# Patient Record
Sex: FEMALE | Race: ASIAN | NOT HISPANIC OR LATINO | Employment: FULL TIME | URBAN - METROPOLITAN AREA
[De-identification: names, ages, dates, MRNs, and addresses within clinical notes are randomized per-mention and may not be internally consistent; named-entity substitution may affect disease eponyms.]

---

## 2017-02-02 ENCOUNTER — COMMUNICATION - HEALTHEAST (OUTPATIENT)
Dept: FAMILY MEDICINE | Facility: CLINIC | Age: 32
End: 2017-02-02

## 2017-02-21 ENCOUNTER — RECORDS - HEALTHEAST (OUTPATIENT)
Dept: ADMINISTRATIVE | Facility: OTHER | Age: 32
End: 2017-02-21

## 2017-02-24 ENCOUNTER — RECORDS - HEALTHEAST (OUTPATIENT)
Dept: ADMINISTRATIVE | Facility: OTHER | Age: 32
End: 2017-02-24

## 2017-03-20 ENCOUNTER — COMMUNICATION - HEALTHEAST (OUTPATIENT)
Dept: FAMILY MEDICINE | Facility: CLINIC | Age: 32
End: 2017-03-20

## 2017-03-23 ENCOUNTER — RECORDS - HEALTHEAST (OUTPATIENT)
Dept: ADMINISTRATIVE | Facility: OTHER | Age: 32
End: 2017-03-23

## 2017-03-24 ENCOUNTER — OFFICE VISIT - HEALTHEAST (OUTPATIENT)
Dept: FAMILY MEDICINE | Facility: CLINIC | Age: 32
End: 2017-03-24

## 2017-03-24 DIAGNOSIS — J18.9 PNEUMONIA: ICD-10-CM

## 2017-03-24 DIAGNOSIS — R06.2 WHEEZING: ICD-10-CM

## 2017-03-24 ASSESSMENT — MIFFLIN-ST. JEOR: SCORE: 1452.48

## 2017-04-05 ENCOUNTER — OFFICE VISIT - HEALTHEAST (OUTPATIENT)
Dept: FAMILY MEDICINE | Facility: CLINIC | Age: 32
End: 2017-04-05

## 2017-04-05 DIAGNOSIS — Z30.09 OTHER GENERAL COUNSELING AND ADVICE FOR CONTRACEPTIVE MANAGEMENT: ICD-10-CM

## 2017-04-05 DIAGNOSIS — F06.4 ANXIETY DISORDER DUE TO MEDICAL CONDITION: ICD-10-CM

## 2017-04-05 ASSESSMENT — MIFFLIN-ST. JEOR: SCORE: 1477.2

## 2017-05-02 ENCOUNTER — OFFICE VISIT - HEALTHEAST (OUTPATIENT)
Dept: FAMILY MEDICINE | Facility: CLINIC | Age: 32
End: 2017-05-02

## 2017-05-02 DIAGNOSIS — H10.9 CONJUNCTIVITIS: ICD-10-CM

## 2017-05-02 ASSESSMENT — MIFFLIN-ST. JEOR: SCORE: 1479.46

## 2017-05-10 ENCOUNTER — OFFICE VISIT - HEALTHEAST (OUTPATIENT)
Dept: FAMILY MEDICINE | Facility: CLINIC | Age: 32
End: 2017-05-10

## 2017-05-10 ENCOUNTER — HOSPITAL ENCOUNTER (OUTPATIENT)
Dept: LAB | Age: 32
Setting detail: SPECIMEN
Discharge: HOME | End: 2017-05-10

## 2017-05-10 DIAGNOSIS — H53.8 BLURRY VISION: ICD-10-CM

## 2017-05-10 DIAGNOSIS — M25.50 ARTHRALGIA: ICD-10-CM

## 2017-05-10 ASSESSMENT — MIFFLIN-ST. JEOR: SCORE: 1493.07

## 2017-05-12 LAB — ANA SER QL: 0.3 U

## 2017-05-15 ENCOUNTER — COMMUNICATION - HEALTHEAST (OUTPATIENT)
Dept: FAMILY MEDICINE | Facility: CLINIC | Age: 32
End: 2017-05-15

## 2017-08-09 ENCOUNTER — OFFICE VISIT - HEALTHEAST (OUTPATIENT)
Dept: FAMILY MEDICINE | Facility: CLINIC | Age: 32
End: 2017-08-09

## 2017-08-09 DIAGNOSIS — M25.50 ARTHRALGIA: ICD-10-CM

## 2017-08-09 DIAGNOSIS — M79.10 MYALGIA: ICD-10-CM

## 2017-08-09 ASSESSMENT — MIFFLIN-ST. JEOR: SCORE: 1552.04

## 2017-08-14 ENCOUNTER — COMMUNICATION - HEALTHEAST (OUTPATIENT)
Dept: FAMILY MEDICINE | Facility: CLINIC | Age: 32
End: 2017-08-14

## 2017-08-14 DIAGNOSIS — E56.9 VITAMIN DEFICIENCY: ICD-10-CM

## 2017-09-07 ENCOUNTER — COMMUNICATION - HEALTHEAST (OUTPATIENT)
Dept: FAMILY MEDICINE | Facility: CLINIC | Age: 32
End: 2017-09-07

## 2017-09-07 DIAGNOSIS — F32.A DEPRESSION: ICD-10-CM

## 2017-12-05 ENCOUNTER — COMMUNICATION - HEALTHEAST (OUTPATIENT)
Dept: FAMILY MEDICINE | Facility: CLINIC | Age: 32
End: 2017-12-05

## 2017-12-05 DIAGNOSIS — F32.A DEPRESSION: ICD-10-CM

## 2017-12-27 ENCOUNTER — OFFICE VISIT - HEALTHEAST (OUTPATIENT)
Dept: FAMILY MEDICINE | Facility: CLINIC | Age: 32
End: 2017-12-27

## 2017-12-27 DIAGNOSIS — R51.9 HEADACHE: ICD-10-CM

## 2017-12-27 DIAGNOSIS — M72.2 PLANTAR FASCIITIS, BILATERAL: ICD-10-CM

## 2017-12-27 ASSESSMENT — MIFFLIN-ST. JEOR: SCORE: 1524.82

## 2018-01-29 ENCOUNTER — RECORDS - HEALTHEAST (OUTPATIENT)
Dept: ADMINISTRATIVE | Facility: OTHER | Age: 33
End: 2018-01-29

## 2018-04-07 ENCOUNTER — COMMUNICATION - HEALTHEAST (OUTPATIENT)
Dept: FAMILY MEDICINE | Facility: CLINIC | Age: 33
End: 2018-04-07

## 2018-04-07 DIAGNOSIS — F32.A DEPRESSED: ICD-10-CM

## 2018-04-12 ENCOUNTER — COMMUNICATION - HEALTHEAST (OUTPATIENT)
Dept: FAMILY MEDICINE | Facility: CLINIC | Age: 33
End: 2018-04-12

## 2018-04-12 DIAGNOSIS — E56.9 VITAMIN DEFICIENCY: ICD-10-CM

## 2018-06-04 ENCOUNTER — OFFICE VISIT - HEALTHEAST (OUTPATIENT)
Dept: FAMILY MEDICINE | Facility: CLINIC | Age: 33
End: 2018-06-04

## 2018-06-04 DIAGNOSIS — Z11.3 SCREENING FOR STD (SEXUALLY TRANSMITTED DISEASE): ICD-10-CM

## 2018-06-04 DIAGNOSIS — Z30.432 ENCOUNTER FOR IUD REMOVAL: ICD-10-CM

## 2018-06-04 DIAGNOSIS — L29.9 ITCHING: ICD-10-CM

## 2018-06-04 LAB
CLUE CELLS: NORMAL
TRICHOMONAS, WET PREP: NORMAL
YEAST, WET PREP: NORMAL

## 2018-06-04 RX ORDER — HYDROCORTISONE 10 MG/ML
LOTION TOPICAL
Qty: 118 ML | Refills: 0 | Status: SHIPPED | OUTPATIENT
Start: 2018-06-04 | End: 2023-06-06

## 2018-06-04 ASSESSMENT — MIFFLIN-ST. JEOR: SCORE: 1502.14

## 2018-06-05 ENCOUNTER — COMMUNICATION - HEALTHEAST (OUTPATIENT)
Dept: FAMILY MEDICINE | Facility: CLINIC | Age: 33
End: 2018-06-05

## 2018-06-05 LAB
C TRACH DNA SPEC QL PROBE+SIG AMP: NEGATIVE
N GONORRHOEA DNA SPEC QL NAA+PROBE: NEGATIVE

## 2018-07-17 ENCOUNTER — OFFICE VISIT - HEALTHEAST (OUTPATIENT)
Dept: FAMILY MEDICINE | Facility: CLINIC | Age: 33
End: 2018-07-17

## 2018-07-17 DIAGNOSIS — J02.9 SORE THROAT: ICD-10-CM

## 2018-07-17 DIAGNOSIS — R50.9 FEVER: ICD-10-CM

## 2018-07-17 DIAGNOSIS — J03.01 ACUTE RECURRENT STREPTOCOCCAL TONSILLITIS: ICD-10-CM

## 2018-07-17 LAB — DEPRECATED S PYO AG THROAT QL EIA: ABNORMAL

## 2018-07-17 ASSESSMENT — MIFFLIN-ST. JEOR: SCORE: 1529.36

## 2018-07-31 ENCOUNTER — COMMUNICATION - HEALTHEAST (OUTPATIENT)
Dept: FAMILY MEDICINE | Facility: CLINIC | Age: 33
End: 2018-07-31

## 2018-07-31 DIAGNOSIS — J03.01 ACUTE RECURRENT STREPTOCOCCAL TONSILLITIS: ICD-10-CM

## 2018-08-08 ENCOUNTER — COMMUNICATION - HEALTHEAST (OUTPATIENT)
Dept: FAMILY MEDICINE | Facility: CLINIC | Age: 33
End: 2018-08-08

## 2018-08-08 DIAGNOSIS — J03.01 ACUTE RECURRENT STREPTOCOCCAL TONSILLITIS: ICD-10-CM

## 2018-08-16 ENCOUNTER — COMMUNICATION - HEALTHEAST (OUTPATIENT)
Dept: FAMILY MEDICINE | Facility: CLINIC | Age: 33
End: 2018-08-16

## 2018-08-16 DIAGNOSIS — J03.01 ACUTE RECURRENT STREPTOCOCCAL TONSILLITIS: ICD-10-CM

## 2018-09-28 ENCOUNTER — OFFICE VISIT - HEALTHEAST (OUTPATIENT)
Dept: FAMILY MEDICINE | Facility: CLINIC | Age: 33
End: 2018-09-28

## 2018-09-28 DIAGNOSIS — G56.03 BILATERAL CARPAL TUNNEL SYNDROME: ICD-10-CM

## 2018-09-28 DIAGNOSIS — F06.4 ANXIETY DISORDER DUE TO MEDICAL CONDITION: ICD-10-CM

## 2018-09-28 DIAGNOSIS — Z23 IMMUNIZATION DUE: ICD-10-CM

## 2018-09-28 DIAGNOSIS — M54.9 UPPER BACK PAIN: ICD-10-CM

## 2018-09-28 DIAGNOSIS — Z23 NEED FOR IMMUNIZATION AGAINST INFLUENZA: ICD-10-CM

## 2018-09-28 ASSESSMENT — MIFFLIN-ST. JEOR: SCORE: 1511.22

## 2019-01-06 ENCOUNTER — COMMUNICATION - HEALTHEAST (OUTPATIENT)
Dept: FAMILY MEDICINE | Facility: CLINIC | Age: 34
End: 2019-01-06

## 2019-01-06 DIAGNOSIS — G89.29 OTHER CHRONIC PAIN: ICD-10-CM

## 2019-01-15 ENCOUNTER — COMMUNICATION - HEALTHEAST (OUTPATIENT)
Dept: FAMILY MEDICINE | Facility: CLINIC | Age: 34
End: 2019-01-15

## 2019-01-15 DIAGNOSIS — F32.A DEPRESSED: ICD-10-CM

## 2019-03-25 ENCOUNTER — AMBULATORY - HEALTHEAST (OUTPATIENT)
Dept: FAMILY MEDICINE | Facility: CLINIC | Age: 34
End: 2019-03-25

## 2019-03-25 DIAGNOSIS — F06.4 ANXIETY DISORDER DUE TO MEDICAL CONDITION: ICD-10-CM

## 2019-03-25 DIAGNOSIS — F43.10 POSTTRAUMATIC STRESS DISORDER: ICD-10-CM

## 2019-03-25 DIAGNOSIS — Z63.5 FAMILY DISRUPTION DUE TO DIVORCE: ICD-10-CM

## 2019-03-25 DIAGNOSIS — F60.3 BORDERLINE PERSONALITY DISORDER (H): ICD-10-CM

## 2019-03-25 SDOH — SOCIAL STABILITY - SOCIAL INSECURITY: DISRUPTION OF FAMILY BY SEPARATION AND DIVORCE: Z63.5

## 2019-04-02 ENCOUNTER — OFFICE VISIT - HEALTHEAST (OUTPATIENT)
Dept: FAMILY MEDICINE | Facility: CLINIC | Age: 34
End: 2019-04-02

## 2019-04-02 DIAGNOSIS — J02.0 ACUTE STREPTOCOCCAL PHARYNGITIS: ICD-10-CM

## 2019-04-02 DIAGNOSIS — J02.9 SORE THROAT: ICD-10-CM

## 2019-04-02 LAB — DEPRECATED S PYO AG THROAT QL EIA: ABNORMAL

## 2019-04-02 ASSESSMENT — MIFFLIN-ST. JEOR: SCORE: 1578.12

## 2019-04-20 ENCOUNTER — COMMUNICATION - HEALTHEAST (OUTPATIENT)
Dept: FAMILY MEDICINE | Facility: CLINIC | Age: 34
End: 2019-04-20

## 2019-04-20 DIAGNOSIS — E56.9 VITAMIN DEFICIENCY: ICD-10-CM

## 2019-05-20 ENCOUNTER — COMMUNICATION - HEALTHEAST (OUTPATIENT)
Dept: FAMILY MEDICINE | Facility: CLINIC | Age: 34
End: 2019-05-20

## 2019-05-21 ENCOUNTER — COMMUNICATION - HEALTHEAST (OUTPATIENT)
Dept: FAMILY MEDICINE | Facility: CLINIC | Age: 34
End: 2019-05-21

## 2019-05-22 ENCOUNTER — COMMUNICATION - HEALTHEAST (OUTPATIENT)
Dept: ADMINISTRATIVE | Facility: CLINIC | Age: 34
End: 2019-05-22

## 2019-05-28 ENCOUNTER — OFFICE VISIT - HEALTHEAST (OUTPATIENT)
Dept: FAMILY MEDICINE | Facility: CLINIC | Age: 34
End: 2019-05-28

## 2019-05-28 DIAGNOSIS — F43.10 POSTTRAUMATIC STRESS DISORDER: ICD-10-CM

## 2019-05-28 DIAGNOSIS — E66.3 OVERWEIGHT: ICD-10-CM

## 2019-05-28 DIAGNOSIS — F06.4 ANXIETY DISORDER DUE TO MEDICAL CONDITION: ICD-10-CM

## 2019-05-28 LAB
ALBUMIN SERPL-MCNC: 3.7 G/DL (ref 3.5–5)
ALP SERPL-CCNC: 64 U/L (ref 45–120)
ALT SERPL W P-5'-P-CCNC: 31 U/L (ref 0–45)
ANION GAP SERPL CALCULATED.3IONS-SCNC: 12 MMOL/L (ref 5–18)
AST SERPL W P-5'-P-CCNC: 34 U/L (ref 0–40)
BASOPHILS # BLD AUTO: 0.1 THOU/UL (ref 0–0.2)
BASOPHILS NFR BLD AUTO: 1 % (ref 0–2)
BILIRUB SERPL-MCNC: 0.1 MG/DL (ref 0–1)
BUN SERPL-MCNC: 12 MG/DL (ref 8–22)
CALCIUM SERPL-MCNC: 9.6 MG/DL (ref 8.5–10.5)
CHLORIDE BLD-SCNC: 103 MMOL/L (ref 98–107)
CO2 SERPL-SCNC: 22 MMOL/L (ref 22–31)
CREAT SERPL-MCNC: 0.67 MG/DL (ref 0.6–1.1)
EOSINOPHIL # BLD AUTO: 0.2 THOU/UL (ref 0–0.4)
EOSINOPHIL NFR BLD AUTO: 2 % (ref 0–6)
ERYTHROCYTE [DISTWIDTH] IN BLOOD BY AUTOMATED COUNT: 12.9 % (ref 11–14.5)
GFR SERPL CREATININE-BSD FRML MDRD: >60 ML/MIN/1.73M2
GLUCOSE BLD-MCNC: 135 MG/DL (ref 70–125)
HCT VFR BLD AUTO: 38.3 % (ref 35–47)
HGB BLD-MCNC: 12.6 G/DL (ref 12–16)
LYMPHOCYTES # BLD AUTO: 2.4 THOU/UL (ref 0.8–4.4)
LYMPHOCYTES NFR BLD AUTO: 26 % (ref 20–40)
MCH RBC QN AUTO: 25.4 PG (ref 27–34)
MCHC RBC AUTO-ENTMCNC: 32.9 G/DL (ref 32–36)
MCV RBC AUTO: 77 FL (ref 80–100)
MONOCYTES # BLD AUTO: 0.6 THOU/UL (ref 0–0.9)
MONOCYTES NFR BLD AUTO: 6 % (ref 2–10)
NEUTROPHILS # BLD AUTO: 5.9 THOU/UL (ref 2–7.7)
NEUTROPHILS NFR BLD AUTO: 65 % (ref 50–70)
PLATELET # BLD AUTO: 343 THOU/UL (ref 140–440)
PMV BLD AUTO: 7.6 FL (ref 7–10)
POTASSIUM BLD-SCNC: 3.9 MMOL/L (ref 3.5–5)
PROT SERPL-MCNC: 7.3 G/DL (ref 6–8)
RBC # BLD AUTO: 4.96 MILL/UL (ref 3.8–5.4)
SODIUM SERPL-SCNC: 137 MMOL/L (ref 136–145)
TSH SERPL DL<=0.005 MIU/L-ACNC: 2.99 UIU/ML (ref 0.3–5)
WBC: 9.1 THOU/UL (ref 4–11)

## 2019-05-28 ASSESSMENT — MIFFLIN-ST. JEOR: SCORE: 1574.72

## 2019-06-03 ENCOUNTER — COMMUNICATION - HEALTHEAST (OUTPATIENT)
Dept: FAMILY MEDICINE | Facility: CLINIC | Age: 34
End: 2019-06-03

## 2019-09-16 ENCOUNTER — COMMUNICATION - HEALTHEAST (OUTPATIENT)
Dept: FAMILY MEDICINE | Facility: CLINIC | Age: 34
End: 2019-09-16

## 2019-09-28 ENCOUNTER — COMMUNICATION - HEALTHEAST (OUTPATIENT)
Dept: FAMILY MEDICINE | Facility: CLINIC | Age: 34
End: 2019-09-28

## 2019-09-28 DIAGNOSIS — F32.A DEPRESSED: ICD-10-CM

## 2019-10-21 ENCOUNTER — COMMUNICATION - HEALTHEAST (OUTPATIENT)
Dept: FAMILY MEDICINE | Facility: CLINIC | Age: 34
End: 2019-10-21

## 2019-10-21 DIAGNOSIS — G89.29 OTHER CHRONIC PAIN: ICD-10-CM

## 2019-11-21 ENCOUNTER — OFFICE VISIT - HEALTHEAST (OUTPATIENT)
Dept: FAMILY MEDICINE | Facility: CLINIC | Age: 34
End: 2019-11-21

## 2019-11-21 DIAGNOSIS — F33.1 MODERATE EPISODE OF RECURRENT MAJOR DEPRESSIVE DISORDER (H): ICD-10-CM

## 2019-11-21 DIAGNOSIS — Z09 HOSPITAL DISCHARGE FOLLOW-UP: ICD-10-CM

## 2019-11-21 DIAGNOSIS — R73.9 ELEVATED RANDOM BLOOD GLUCOSE LEVEL: ICD-10-CM

## 2019-11-21 DIAGNOSIS — Z23 NEED FOR IMMUNIZATION AGAINST INFLUENZA: ICD-10-CM

## 2019-11-21 LAB — HBA1C MFR BLD: 5.1 % (ref 3.5–6)

## 2019-11-21 ASSESSMENT — MIFFLIN-ST. JEOR: SCORE: 1470.39

## 2019-11-21 ASSESSMENT — PATIENT HEALTH QUESTIONNAIRE - PHQ9: SUM OF ALL RESPONSES TO PHQ QUESTIONS 1-9: 10

## 2020-02-03 ENCOUNTER — OFFICE VISIT - HEALTHEAST (OUTPATIENT)
Dept: FAMILY MEDICINE | Facility: CLINIC | Age: 35
End: 2020-02-03

## 2020-02-03 DIAGNOSIS — A08.4 VIRAL GASTROENTERITIS: ICD-10-CM

## 2020-02-03 DIAGNOSIS — R50.9 FEVER IN ADULT: ICD-10-CM

## 2020-02-03 LAB
FLUAV AG SPEC QL IA: NORMAL
FLUBV AG SPEC QL IA: NORMAL

## 2020-02-03 ASSESSMENT — MIFFLIN-ST. JEOR: SCORE: 1452.53

## 2020-02-04 ENCOUNTER — COMMUNICATION - HEALTHEAST (OUTPATIENT)
Dept: FAMILY MEDICINE | Facility: CLINIC | Age: 35
End: 2020-02-04

## 2020-02-07 ENCOUNTER — OFFICE VISIT - HEALTHEAST (OUTPATIENT)
Dept: FAMILY MEDICINE | Facility: CLINIC | Age: 35
End: 2020-02-07

## 2020-02-07 DIAGNOSIS — R10.13 EPIGASTRIC ABDOMINAL PAIN: ICD-10-CM

## 2020-02-07 DIAGNOSIS — R07.0 THROAT PAIN: ICD-10-CM

## 2020-02-07 LAB — DEPRECATED S PYO AG THROAT QL EIA: NORMAL

## 2020-02-09 LAB — GROUP A STREP BY PCR: NORMAL

## 2020-02-20 ENCOUNTER — COMMUNICATION - HEALTHEAST (OUTPATIENT)
Dept: FAMILY MEDICINE | Facility: CLINIC | Age: 35
End: 2020-02-20

## 2020-03-02 ENCOUNTER — RECORDS - HEALTHEAST (OUTPATIENT)
Dept: ADMINISTRATIVE | Facility: OTHER | Age: 35
End: 2020-03-02

## 2020-05-09 ENCOUNTER — COMMUNICATION - HEALTHEAST (OUTPATIENT)
Dept: FAMILY MEDICINE | Facility: CLINIC | Age: 35
End: 2020-05-09

## 2020-05-09 DIAGNOSIS — E56.9 VITAMIN DEFICIENCY: ICD-10-CM

## 2020-05-17 ENCOUNTER — RECORDS - HEALTHEAST (OUTPATIENT)
Dept: ADMINISTRATIVE | Facility: OTHER | Age: 35
End: 2020-05-17

## 2020-05-18 ENCOUNTER — OFFICE VISIT - HEALTHEAST (OUTPATIENT)
Dept: FAMILY MEDICINE | Facility: CLINIC | Age: 35
End: 2020-05-18

## 2020-05-18 DIAGNOSIS — U07.1 2019 NOVEL CORONAVIRUS DISEASE (COVID-19): ICD-10-CM

## 2020-05-19 ENCOUNTER — COMMUNICATION - HEALTHEAST (OUTPATIENT)
Dept: SCHEDULING | Facility: CLINIC | Age: 35
End: 2020-05-19

## 2020-05-20 ENCOUNTER — OFFICE VISIT - HEALTHEAST (OUTPATIENT)
Dept: FAMILY MEDICINE | Facility: CLINIC | Age: 35
End: 2020-05-20

## 2020-05-20 DIAGNOSIS — U07.1 2019 NOVEL CORONAVIRUS DISEASE (COVID-19): ICD-10-CM

## 2020-05-26 ENCOUNTER — COMMUNICATION - HEALTHEAST (OUTPATIENT)
Dept: FAMILY MEDICINE | Facility: CLINIC | Age: 35
End: 2020-05-26

## 2020-05-26 ENCOUNTER — OFFICE VISIT - HEALTHEAST (OUTPATIENT)
Dept: FAMILY MEDICINE | Facility: CLINIC | Age: 35
End: 2020-05-26

## 2020-05-26 DIAGNOSIS — R52 GENERALIZED BODY ACHES: ICD-10-CM

## 2020-05-26 DIAGNOSIS — U07.1 2019 NOVEL CORONAVIRUS DISEASE (COVID-19): ICD-10-CM

## 2020-05-26 DIAGNOSIS — M62.81 GENERALIZED MUSCLE WEAKNESS: ICD-10-CM

## 2020-05-26 DIAGNOSIS — R53.83 OTHER FATIGUE: ICD-10-CM

## 2020-05-29 ENCOUNTER — OFFICE VISIT - HEALTHEAST (OUTPATIENT)
Dept: FAMILY MEDICINE | Facility: CLINIC | Age: 35
End: 2020-05-29

## 2020-05-29 DIAGNOSIS — U07.1 2019 NOVEL CORONAVIRUS DISEASE (COVID-19): ICD-10-CM

## 2020-05-29 DIAGNOSIS — R42 DIZZINESS: ICD-10-CM

## 2020-05-29 DIAGNOSIS — U07.1 CLINICAL DIAGNOSIS OF COVID-19: ICD-10-CM

## 2020-06-03 ENCOUNTER — COMMUNICATION - HEALTHEAST (OUTPATIENT)
Dept: FAMILY MEDICINE | Facility: CLINIC | Age: 35
End: 2020-06-03

## 2020-06-03 DIAGNOSIS — F32.A DEPRESSED: ICD-10-CM

## 2020-06-15 ENCOUNTER — COMMUNICATION - HEALTHEAST (OUTPATIENT)
Dept: FAMILY MEDICINE | Facility: CLINIC | Age: 35
End: 2020-06-15

## 2020-06-22 ENCOUNTER — COMMUNICATION - HEALTHEAST (OUTPATIENT)
Dept: SCHEDULING | Facility: CLINIC | Age: 35
End: 2020-06-22

## 2020-07-17 ENCOUNTER — COMMUNICATION - HEALTHEAST (OUTPATIENT)
Dept: FAMILY MEDICINE | Facility: CLINIC | Age: 35
End: 2020-07-17

## 2020-07-17 DIAGNOSIS — G89.29 OTHER CHRONIC PAIN: ICD-10-CM

## 2020-07-20 ENCOUNTER — OFFICE VISIT - HEALTHEAST (OUTPATIENT)
Dept: FAMILY MEDICINE | Facility: CLINIC | Age: 35
End: 2020-07-20

## 2020-07-20 DIAGNOSIS — R06.02 SHORTNESS OF BREATH: ICD-10-CM

## 2020-07-20 DIAGNOSIS — F32.A DEPRESSED: ICD-10-CM

## 2020-07-20 DIAGNOSIS — R53.82 CHRONIC FATIGUE: ICD-10-CM

## 2020-07-20 DIAGNOSIS — Z12.4 SCREENING FOR CERVICAL CANCER: ICD-10-CM

## 2020-07-20 DIAGNOSIS — U07.1 2019 NOVEL CORONAVIRUS DISEASE (COVID-19): ICD-10-CM

## 2020-07-20 DIAGNOSIS — D64.9 ANEMIA, UNSPECIFIED TYPE: ICD-10-CM

## 2020-07-20 DIAGNOSIS — N89.8 VAGINAL ITCHING: ICD-10-CM

## 2020-07-20 LAB
BASOPHILS # BLD AUTO: 0 THOU/UL (ref 0–0.2)
BASOPHILS NFR BLD AUTO: 0 % (ref 0–2)
EOSINOPHIL # BLD AUTO: 0.2 THOU/UL (ref 0–0.4)
EOSINOPHIL NFR BLD AUTO: 3 % (ref 0–6)
ERYTHROCYTE [DISTWIDTH] IN BLOOD BY AUTOMATED COUNT: 13.2 % (ref 11–14.5)
HCT VFR BLD AUTO: 35.5 % (ref 35–47)
HGB BLD-MCNC: 11.8 G/DL (ref 12–16)
LYMPHOCYTES # BLD AUTO: 2.3 THOU/UL (ref 0.8–4.4)
LYMPHOCYTES NFR BLD AUTO: 30 % (ref 20–40)
MCH RBC QN AUTO: 24.8 PG (ref 27–34)
MCHC RBC AUTO-ENTMCNC: 33.2 G/DL (ref 32–36)
MCV RBC AUTO: 75 FL (ref 80–100)
MONOCYTES # BLD AUTO: 0.6 THOU/UL (ref 0–0.9)
MONOCYTES NFR BLD AUTO: 7 % (ref 2–10)
NEUTROPHILS # BLD AUTO: 4.7 THOU/UL (ref 2–7.7)
NEUTROPHILS NFR BLD AUTO: 60 % (ref 50–70)
PLATELET # BLD AUTO: 327 THOU/UL (ref 140–440)
PMV BLD AUTO: 7.4 FL (ref 7–10)
RBC # BLD AUTO: 4.75 MILL/UL (ref 3.8–5.4)
TSH SERPL DL<=0.005 MIU/L-ACNC: 1.74 UIU/ML (ref 0.3–5)
WBC: 7.8 THOU/UL (ref 4–11)

## 2020-07-20 RX ORDER — FERROUS SULFATE 325(65) MG
1 TABLET ORAL DAILY
Qty: 90 TABLET | Refills: 3 | Status: SHIPPED | OUTPATIENT
Start: 2020-07-20 | End: 2021-07-21

## 2020-07-20 ASSESSMENT — MIFFLIN-ST. JEOR: SCORE: 1472.67

## 2020-07-21 ENCOUNTER — COMMUNICATION - HEALTHEAST (OUTPATIENT)
Dept: FAMILY MEDICINE | Facility: CLINIC | Age: 35
End: 2020-07-21

## 2020-07-21 LAB
HPV SOURCE: NORMAL
HUMAN PAPILLOMA VIRUS 16 DNA: NEGATIVE
HUMAN PAPILLOMA VIRUS 18 DNA: NEGATIVE
HUMAN PAPILLOMA VIRUS FINAL DIAGNOSIS: NORMAL
HUMAN PAPILLOMA VIRUS OTHER HR: NEGATIVE
SPECIMEN DESCRIPTION: NORMAL

## 2020-07-29 ENCOUNTER — COMMUNICATION - HEALTHEAST (OUTPATIENT)
Dept: FAMILY MEDICINE | Facility: CLINIC | Age: 35
End: 2020-07-29

## 2020-07-29 LAB
BKR LAB AP ABNORMAL BLEEDING: NO
BKR LAB AP BIRTH CONTROL/HORMONES: NORMAL
BKR LAB AP CERVICAL APPEARANCE: NORMAL
BKR LAB AP GYN ADEQUACY: NORMAL
BKR LAB AP GYN INTERPRETATION: NORMAL
BKR LAB AP HPV REFLEX: NORMAL
BKR LAB AP LMP: NORMAL
BKR LAB AP PATIENT STATUS: NORMAL
BKR LAB AP PREVIOUS ABNORMAL: NO
BKR LAB AP PREVIOUS NORMAL: 2015
HIGH RISK?: NO
PATH REPORT.COMMENTS IMP SPEC: NORMAL
RESULT FLAG (HE HISTORICAL CONVERSION): NORMAL

## 2020-11-27 ENCOUNTER — RECORDS - HEALTHEAST (OUTPATIENT)
Dept: ADMINISTRATIVE | Facility: OTHER | Age: 35
End: 2020-11-27

## 2021-01-12 ENCOUNTER — OFFICE VISIT - HEALTHEAST (OUTPATIENT)
Dept: FAMILY MEDICINE | Facility: CLINIC | Age: 36
End: 2021-01-12

## 2021-01-12 DIAGNOSIS — R00.2 PALPITATIONS: ICD-10-CM

## 2021-01-12 DIAGNOSIS — F06.4 ANXIETY DISORDER DUE TO MEDICAL CONDITION: ICD-10-CM

## 2021-01-12 DIAGNOSIS — F42.8 OTHER OBSESSIVE-COMPULSIVE DISORDERS: ICD-10-CM

## 2021-01-12 DIAGNOSIS — F60.3 BORDERLINE PERSONALITY DISORDER (H): ICD-10-CM

## 2021-01-12 DIAGNOSIS — R55 NEAR SYNCOPE: ICD-10-CM

## 2021-01-12 DIAGNOSIS — F43.10 POSTTRAUMATIC STRESS DISORDER: ICD-10-CM

## 2021-01-12 DIAGNOSIS — F33.1 MODERATE EPISODE OF RECURRENT MAJOR DEPRESSIVE DISORDER (H): ICD-10-CM

## 2021-01-12 DIAGNOSIS — G44.229 CHRONIC TENSION-TYPE HEADACHE, NOT INTRACTABLE: ICD-10-CM

## 2021-01-12 ASSESSMENT — MIFFLIN-ST. JEOR: SCORE: 1469.27

## 2021-03-02 ENCOUNTER — HOSPITAL ENCOUNTER (OUTPATIENT)
Dept: CARDIOLOGY | Facility: HOSPITAL | Age: 36
Discharge: HOME | End: 2021-03-02
Attending: FAMILY MEDICINE

## 2021-03-02 DIAGNOSIS — R55 NEAR SYNCOPE: ICD-10-CM

## 2021-03-02 DIAGNOSIS — R00.2 PALPITATIONS: ICD-10-CM

## 2021-03-10 ENCOUNTER — OFFICE VISIT - HEALTHEAST (OUTPATIENT)
Dept: FAMILY MEDICINE | Facility: CLINIC | Age: 36
End: 2021-03-10

## 2021-03-10 ENCOUNTER — COMMUNICATION - HEALTHEAST (OUTPATIENT)
Dept: SCHEDULING | Facility: CLINIC | Age: 36
End: 2021-03-10

## 2021-03-10 DIAGNOSIS — F06.4 ANXIETY DISORDER DUE TO MEDICAL CONDITION: ICD-10-CM

## 2021-03-10 DIAGNOSIS — Z11.4 SCREENING FOR HIV WITHOUT PRESENCE OF RISK FACTORS: ICD-10-CM

## 2021-03-10 DIAGNOSIS — F43.10 POSTTRAUMATIC STRESS DISORDER: ICD-10-CM

## 2021-03-10 DIAGNOSIS — F41.0 PANIC ATTACK: ICD-10-CM

## 2021-03-10 DIAGNOSIS — F60.3 BORDERLINE PERSONALITY DISORDER (H): ICD-10-CM

## 2021-03-10 DIAGNOSIS — Z11.59 ENCOUNTER FOR HEPATITIS C SCREENING TEST FOR LOW RISK PATIENT: ICD-10-CM

## 2021-03-10 DIAGNOSIS — R73.9 ELEVATED RANDOM BLOOD GLUCOSE LEVEL: ICD-10-CM

## 2021-03-10 DIAGNOSIS — E66.3 OVERWEIGHT: ICD-10-CM

## 2021-03-10 LAB
ALBUMIN SERPL-MCNC: 4 G/DL (ref 3.5–5)
ALBUMIN UR-MCNC: NEGATIVE G/DL
ALP SERPL-CCNC: 56 U/L (ref 45–120)
ALT SERPL W P-5'-P-CCNC: 22 U/L (ref 0–45)
AMORPH CRY #/AREA URNS HPF: ABNORMAL /[HPF]
ANION GAP SERPL CALCULATED.3IONS-SCNC: 13 MMOL/L (ref 5–18)
APPEARANCE UR: CLEAR
AST SERPL W P-5'-P-CCNC: 26 U/L (ref 0–40)
BACTERIA #/AREA URNS HPF: ABNORMAL /[HPF]
BILIRUB SERPL-MCNC: 0.2 MG/DL (ref 0–1)
BILIRUB UR QL STRIP: NEGATIVE
BUN SERPL-MCNC: 18 MG/DL (ref 8–22)
CALCIUM SERPL-MCNC: 8.8 MG/DL (ref 8.5–10.5)
CHLORIDE BLD-SCNC: 102 MMOL/L (ref 98–107)
CO2 SERPL-SCNC: 26 MMOL/L (ref 22–31)
COLOR UR AUTO: YELLOW
CREAT SERPL-MCNC: 0.8 MG/DL (ref 0.6–1.1)
ERYTHROCYTE [DISTWIDTH] IN BLOOD BY AUTOMATED COUNT: 12.9 % (ref 11–14.5)
GFR SERPL CREATININE-BSD FRML MDRD: >60 ML/MIN/1.73M2
GLUCOSE BLD-MCNC: 89 MG/DL (ref 70–125)
GLUCOSE UR STRIP-MCNC: NEGATIVE MG/DL
HBA1C MFR BLD: 5.2 %
HCT VFR BLD AUTO: 44.1 % (ref 35–47)
HGB BLD-MCNC: 14.2 G/DL (ref 12–16)
HGB UR QL STRIP: ABNORMAL
HIV 1+2 AB+HIV1 P24 AG SERPL QL IA: NEGATIVE
KETONES UR STRIP-MCNC: NEGATIVE MG/DL
LEUKOCYTE ESTERASE UR QL STRIP: NEGATIVE
MCH RBC QN AUTO: 26.1 PG (ref 27–34)
MCHC RBC AUTO-ENTMCNC: 32.2 G/DL (ref 32–36)
MCV RBC AUTO: 81 FL (ref 80–100)
NITRATE UR QL: NEGATIVE
PH UR STRIP: 8.5 [PH] (ref 5–8)
PLATELET # BLD AUTO: 347 THOU/UL (ref 140–440)
PMV BLD AUTO: 9.4 FL (ref 7–10)
POTASSIUM BLD-SCNC: 3.9 MMOL/L (ref 3.5–5)
PROT SERPL-MCNC: 7.6 G/DL (ref 6–8)
RBC # BLD AUTO: 5.45 MILL/UL (ref 3.8–5.4)
RBC #/AREA URNS AUTO: ABNORMAL HPF
SODIUM SERPL-SCNC: 141 MMOL/L (ref 136–145)
SP GR UR STRIP: 1.02 (ref 1–1.03)
SQUAMOUS #/AREA URNS AUTO: ABNORMAL LPF
TSH SERPL DL<=0.005 MIU/L-ACNC: 2.33 UIU/ML (ref 0.3–5)
UROBILINOGEN UR STRIP-ACNC: ABNORMAL
WBC #/AREA URNS AUTO: ABNORMAL HPF
WBC: 6.9 THOU/UL (ref 4–11)

## 2021-03-10 RX ORDER — PROPRANOLOL HYDROCHLORIDE 10 MG/1
10 TABLET ORAL 3 TIMES DAILY PRN
Qty: 30 TABLET | Refills: 3 | Status: SHIPPED | OUTPATIENT
Start: 2021-03-10 | End: 2021-07-21

## 2021-03-10 ASSESSMENT — PATIENT HEALTH QUESTIONNAIRE - PHQ9: SUM OF ALL RESPONSES TO PHQ QUESTIONS 1-9: 0

## 2021-03-10 ASSESSMENT — MIFFLIN-ST. JEOR: SCORE: 1472.67

## 2021-03-11 ENCOUNTER — COMMUNICATION - HEALTHEAST (OUTPATIENT)
Dept: FAMILY MEDICINE | Facility: CLINIC | Age: 36
End: 2021-03-11

## 2021-03-11 ENCOUNTER — RECORDS - HEALTHEAST (OUTPATIENT)
Dept: ADMINISTRATIVE | Facility: OTHER | Age: 36
End: 2021-03-11

## 2021-03-11 LAB
BACTERIA SPEC CULT: NORMAL
HCV AB SERPL QL IA: NEGATIVE

## 2021-03-15 ENCOUNTER — COMMUNICATION - HEALTHEAST (OUTPATIENT)
Dept: FAMILY MEDICINE | Facility: CLINIC | Age: 36
End: 2021-03-15

## 2021-03-16 ENCOUNTER — COMMUNICATION - HEALTHEAST (OUTPATIENT)
Dept: FAMILY MEDICINE | Facility: CLINIC | Age: 36
End: 2021-03-16

## 2021-03-22 ENCOUNTER — COMMUNICATION - HEALTHEAST (OUTPATIENT)
Dept: NURSING | Facility: CLINIC | Age: 36
End: 2021-03-22

## 2021-03-22 ENCOUNTER — OFFICE VISIT - HEALTHEAST (OUTPATIENT)
Dept: FAMILY MEDICINE | Facility: CLINIC | Age: 36
End: 2021-03-22

## 2021-03-22 DIAGNOSIS — F41.0 PANIC ATTACK: ICD-10-CM

## 2021-03-24 ENCOUNTER — COMMUNICATION - HEALTHEAST (OUTPATIENT)
Dept: CARE COORDINATION | Facility: CLINIC | Age: 36
End: 2021-03-24

## 2021-03-25 ENCOUNTER — COMMUNICATION - HEALTHEAST (OUTPATIENT)
Dept: CARE COORDINATION | Facility: CLINIC | Age: 36
End: 2021-03-25

## 2021-03-26 ENCOUNTER — COMMUNICATION - HEALTHEAST (OUTPATIENT)
Dept: CARE COORDINATION | Facility: CLINIC | Age: 36
End: 2021-03-26

## 2021-03-29 ENCOUNTER — COMMUNICATION - HEALTHEAST (OUTPATIENT)
Dept: CARE COORDINATION | Facility: CLINIC | Age: 36
End: 2021-03-29

## 2021-03-30 ENCOUNTER — COMMUNICATION - HEALTHEAST (OUTPATIENT)
Dept: NURSING | Facility: CLINIC | Age: 36
End: 2021-03-30

## 2021-03-31 ENCOUNTER — COMMUNICATION - HEALTHEAST (OUTPATIENT)
Dept: NURSING | Facility: CLINIC | Age: 36
End: 2021-03-31

## 2021-04-05 ENCOUNTER — COMMUNICATION - HEALTHEAST (OUTPATIENT)
Dept: NURSING | Facility: CLINIC | Age: 36
End: 2021-04-05

## 2021-04-08 ENCOUNTER — COMMUNICATION - HEALTHEAST (OUTPATIENT)
Dept: CARE COORDINATION | Facility: CLINIC | Age: 36
End: 2021-04-08

## 2021-04-13 ENCOUNTER — COMMUNICATION - HEALTHEAST (OUTPATIENT)
Dept: FAMILY MEDICINE | Facility: CLINIC | Age: 36
End: 2021-04-13

## 2021-04-13 DIAGNOSIS — G89.29 OTHER CHRONIC PAIN: ICD-10-CM

## 2021-04-14 ENCOUNTER — COMMUNICATION - HEALTHEAST (OUTPATIENT)
Dept: FAMILY MEDICINE | Facility: CLINIC | Age: 36
End: 2021-04-14

## 2021-04-14 DIAGNOSIS — F32.A DEPRESSED: ICD-10-CM

## 2021-04-14 RX ORDER — GABAPENTIN 300 MG/1
CAPSULE ORAL
Qty: 270 CAPSULE | Refills: 3 | Status: SHIPPED | OUTPATIENT
Start: 2021-04-14 | End: 2022-04-13

## 2021-04-15 RX ORDER — PAROXETINE 20 MG/1
TABLET, FILM COATED ORAL
Qty: 180 TABLET | Refills: 3 | Status: SHIPPED | OUTPATIENT
Start: 2021-04-15 | End: 2022-05-23

## 2021-04-20 ENCOUNTER — COMMUNICATION - HEALTHEAST (OUTPATIENT)
Dept: FAMILY MEDICINE | Facility: CLINIC | Age: 36
End: 2021-04-20

## 2021-04-20 DIAGNOSIS — U07.1 2019 NOVEL CORONAVIRUS DISEASE (COVID-19): ICD-10-CM

## 2021-04-20 RX ORDER — ACETAMINOPHEN 325 MG/1
TABLET ORAL
Qty: 300 TABLET | Refills: 1 | Status: SHIPPED | OUTPATIENT
Start: 2021-04-20 | End: 2021-07-21

## 2021-04-30 ENCOUNTER — COMMUNICATION - HEALTHEAST (OUTPATIENT)
Dept: ADMINISTRATIVE | Facility: CLINIC | Age: 36
End: 2021-04-30

## 2021-05-05 ENCOUNTER — COMMUNICATION - HEALTHEAST (OUTPATIENT)
Dept: INTERNAL MEDICINE | Facility: CLINIC | Age: 36
End: 2021-05-05

## 2021-05-05 DIAGNOSIS — R42 DIZZINESS: ICD-10-CM

## 2021-05-05 DIAGNOSIS — R06.02 SHORTNESS OF BREATH: ICD-10-CM

## 2021-05-05 DIAGNOSIS — J03.01 ACUTE RECURRENT STREPTOCOCCAL TONSILLITIS: ICD-10-CM

## 2021-05-05 DIAGNOSIS — E56.9 VITAMIN DEFICIENCY: ICD-10-CM

## 2021-05-05 DIAGNOSIS — R10.13 EPIGASTRIC ABDOMINAL PAIN: ICD-10-CM

## 2021-05-05 DIAGNOSIS — F43.10 POSTTRAUMATIC STRESS DISORDER: ICD-10-CM

## 2021-05-06 RX ORDER — PRAZOSIN HYDROCHLORIDE 1 MG/1
1 CAPSULE ORAL AT BEDTIME
Qty: 30 CAPSULE | Refills: 12 | Status: SHIPPED | OUTPATIENT
Start: 2021-05-06 | End: 2023-06-06

## 2021-05-06 RX ORDER — IBUPROFEN 400 MG/1
TABLET, FILM COATED ORAL
Qty: 30 TABLET | Refills: 0 | Status: SHIPPED | OUTPATIENT
Start: 2021-05-06 | End: 2023-06-06

## 2021-05-06 RX ORDER — LORATADINE 10 MG/1
10 TABLET ORAL DAILY
Qty: 30 TABLET | Refills: 11 | Status: SHIPPED | OUTPATIENT
Start: 2021-05-06 | End: 2023-06-06

## 2021-05-06 RX ORDER — ALBUTEROL SULFATE 90 UG/1
2 AEROSOL, METERED RESPIRATORY (INHALATION) EVERY 6 HOURS PRN
Qty: 2 INHALER | Refills: 3 | Status: SHIPPED | OUTPATIENT
Start: 2021-05-06 | End: 2024-01-29

## 2021-05-06 RX ORDER — FAMOTIDINE 10 MG
20 TABLET ORAL 2 TIMES DAILY
Qty: 30 TABLET | Refills: 0 | Status: SHIPPED | OUTPATIENT
Start: 2021-05-06 | End: 2023-06-06

## 2021-05-11 ENCOUNTER — RECORDS - HEALTHEAST (OUTPATIENT)
Dept: ADMINISTRATIVE | Facility: OTHER | Age: 36
End: 2021-05-11

## 2021-05-13 ENCOUNTER — COMMUNICATION - HEALTHEAST (OUTPATIENT)
Dept: FAMILY MEDICINE | Facility: CLINIC | Age: 36
End: 2021-05-13

## 2021-05-13 DIAGNOSIS — E56.9 VITAMIN DEFICIENCY: ICD-10-CM

## 2021-05-26 ASSESSMENT — PATIENT HEALTH QUESTIONNAIRE - PHQ9: SUM OF ALL RESPONSES TO PHQ QUESTIONS 1-9: 10

## 2021-05-27 ASSESSMENT — PATIENT HEALTH QUESTIONNAIRE - PHQ9: SUM OF ALL RESPONSES TO PHQ QUESTIONS 1-9: 0

## 2021-05-27 NOTE — PROGRESS NOTES
She was here with her daughter who has depression and cutting and was recently discharged from [facility] for suicidal ideation. The daughter reported some of her stress was related to the parents divorce and her mom talking about the dad. The dad apparently has a new girlfriend and is not in contact with the family. The daughter asked that the mom not talk about the dad or the divorce b/c it makes her more upset. We discussed the difference between grownup problems and kid problems and being careful how and what she talks with the kids about and to get her support from other grownups.     Najma is a prior [facility] patient and was seen by Sarbjit Walton for therapy and dc'd in 2017 and then referred to [facility]. She reports she is not getting therapy or [facility] worker recently but she is not sure why. She requested a new referral to restart.     The daughter is also getting referred to adolescent therapy as well.       Dr. Marychuy Lombardi  3/25/2019

## 2021-05-27 NOTE — PROGRESS NOTES
Assessment: /    Plan:    1. Acute streptococcal pharyngitis  amoxicillin (AMOXIL) 875 MG tablet   2. Sore throat  Rapid Strep A Screen- Throat Swab       She has appointment on May 28 with Dr. Nair to follow-up depression.  She also has an appointment [facility] for a physical.    Recheck in the meantime if any problem.    Patient was seen with professional Noemi , Nhi Maldonado.      Subjective:    HPI:  Najma Rodriguez is a 33-year-old female presenting with a headache.  This is been occurring in the past day.  She has headaches from time to time.  She has been taking ibuprofen.  She was prescribed sumatriptan and in 2015, but states that she did not feel well after taking that medication, therefore no longer uses it.  She states that she drinks a lot of water each day.      Review of Systems: No fever or cough.      Current Outpatient Medications   Medication Sig Dispense Refill     acetaminophen (TYLENOL) 325 MG tablet Take 2 tablets (650 mg total) by mouth 3 (three) times a day as needed for pain. 100 tablet 1     albuterol (PROVENTIL HFA;VENTOLIN HFA) 90 mcg/actuation inhaler Inhale 2 puffs every 6 (six) hours as needed for wheezing.       cholecalciferol, vitamin D3, 5,000 unit capsule TAKE 1 CAPSULE BY MOUTH DAILY 90 capsule 3     gabapentin (NEURONTIN) 300 MG capsule Take 1 capsule (300 mg total) by mouth 3 (three) times a day. 270 capsule 2     hydrocortisone 1 % lotion Use over affected area twice daily 118 mL 0     ibuprofen (ADVIL,MOTRIN) 400 MG tablet TAKE 1 TABLET(400 MG) BY MOUTH THREE TIMES DAILY AS NEEDED FOR PAIN OR FEVER 30 tablet 0     PARoxetine (PAXIL) 20 MG tablet TAKE 2 TABLETS(40 MG) BY MOUTH EVERY MORNING 180 tablet 2     prenatal vit-iron fumarate-FA (PRENATAL VITAMIN) 27-0.8 mg Tab Take 1 tablet by mouth daily. 90 tablet 12     amoxicillin (AMOXIL) 875 MG tablet Take 1 tablet (875 mg total) by mouth 2 (two) times a day for 10 days. 20 tablet 0     No current facility-administered  medications for this visit.          Objective:    Vitals:    04/02/19 1642   BP: 96/66   Pulse: 64   Resp: 19   Temp: 98.3  F (36.8  C)   SpO2: 98%       Gen:  NAD, VSS  Ears with serous fluid behind both TMs  Throat with mild erythema  Neck supple without adenopathy  Lungs:  normal  Heart:  normal    Rapid strep is positive      ADDITIONAL HISTORY SUMMARIZED (2): None.  DECISION TO OBTAIN EXTRA INFORMATION (1): None.   RADIOLOGY TESTS (1): None.  LABS (1): Strep.  MEDICINE TESTS (1): None.  INDEPENDENT REVIEW (2 each): None.     Total Data Points: 1

## 2021-05-28 NOTE — TELEPHONE ENCOUNTER
RN cannot approve Refill Request    RN can NOT refill this medication med is not covered by policy/route to provider     . Last office visit: 6/4/2018 Danielle Nair MD Last Physical: Visit date not found Last MTM visit: Visit date not found Last visit same specialty: 4/2/2019 Selwyn Santizo MD.  Next visit within 3 mo: Visit date not found  Next physical within 3 mo: Visit date not found      Lorna Patel, [facility] Triage/Med Refill 4/22/2019    Requested Prescriptions   Pending Prescriptions Disp Refills     cholecalciferol, vitamin D3, 5,000 unit capsule [Pharmacy Med Name: VITAMIN D3 5,000 IU (ROB) CAP] 90 capsule 0     Sig: TAKE 1 CAPSULE BY MOUTH DAILY       There is no refill protocol information for this order

## 2021-05-28 NOTE — TELEPHONE ENCOUNTER
----- Message from Genny Upton sent at 5/15/2019  2:12 PM CDT -----  Regarding: RE: status of therapist and [facility] worker  Radha left a message on voicemail regarding on-going client of theirs.  stating that her and the Therapist Antonia Duenas have attempted to call patient multiple times a week and have not been able to get a hold of her to schedule.     I also CC Delta to see if she has a way of getting a hold of her.     Genny Upton  05.15.19  ----- Message -----  From: Marychuy Lombardi MD  Sent: 5/14/2019   5:00 PM  To: [facility]  Subject: status of therapist and [facility] worker             Can you check on the status of the referral for status of therapist and [facility] worker?    Thanks  Dr. Marychuy Lombardi  5/14/2019

## 2021-05-29 NOTE — TELEPHONE ENCOUNTER
Attempted x 3. Not able to leave voicemail as voicemail is full. Please advise if patient calls back.

## 2021-05-29 NOTE — PROGRESS NOTES
"S:  34 yo female who ishere 30 minutes late, who we were able to find an  to see.  She is here in follow up of her depression and anxiety . She feels very often anxious, this is mostly when she is \"scared\" by something.  This is happening daily.  Sometimes she has a small \"scare\" and it takes her about 10 minutes to recover.  After those episodes, she feels like her liimbs are weak and tired.  This can happen when someone just happens to walk by if she didn't see them.  It happens when her fire alarms go off at work.  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    She has previously been diagnosed with PTSD and anxiety with panic attacks.  She has previously been in therapy with [facility].    She says she takes her paroxetine daily.  She has some difficult sleeping, especially around her menses, when she has a headache and can't sleep for days.  No thoughts of hurting her self or others.  Sometimes she feels she is \"missing something\".  She misses her time with her , since they split up.    She does have nightmares regularly.  On the days that she has those, she has more sx of anxiety and panic the next day.  These are often of difficult or traumatic things that have happened to her in the past.    She has not been scheduled for therapy due to not being able to get in touch with her .     She then describes some sort of possible hallucination, though she cannot describe it well, and knew that it was not real.  It was a visual hallucination.    Her paroxetine makes her sleepy.  She does not want more medication that will make her sleepy .     fam hx:  Her daughter was recently diagnosed with cutting and suicidal ideation.  The patient describes this as her daughter being \"upset with the fact that her dad has a new wife\"  Soc hx:  Patient discontinued [facility] worker.      O:  /66   Pulse 75   Temp 98.2  F (36.8  C) (Oral)   Resp 20   Ht 5' 1.5\" (1.562 m)   Wt 206 lb (93.4 kg)   LMP 05/22/2019 "   SpO2 98%   BMI 38.29 kg/m    Psych:  Appropriately groomed.  No si/hi.  Fluent speech.  Normal affect.  Normal mood. No psychomotor agitation.          Patient Active Problem List   Diagnosis     Functional Murmur     Staring Spells (Reported)     Vitamin D deficiency     Depression     Sciatica     Borderline personality disorder (H)     Chronic Tension-type Headache     Anxiety Disorder Due To General Medical Condition With Panic Attacks     Chronic Pain     Lower Back Pain     Neuralgia     Post-traumatic Stress Disorder     Latent Tuberculosis     Obsessive compulsive disorder     Calcified granuloma of lung (H)     Current Outpatient Medications on File Prior to Visit   Medication Sig Dispense Refill     acetaminophen (TYLENOL) 325 MG tablet Take 2 tablets (650 mg total) by mouth 3 (three) times a day as needed for pain. 100 tablet 1     albuterol (PROVENTIL HFA;VENTOLIN HFA) 90 mcg/actuation inhaler Inhale 2 puffs every 6 (six) hours as needed for wheezing.       cholecalciferol, vitamin D3, 5,000 unit capsule TAKE 1 CAPSULE BY MOUTH DAILY 90 capsule 3     gabapentin (NEURONTIN) 300 MG capsule Take 1 capsule (300 mg total) by mouth 3 (three) times a day. 270 capsule 2     hydrocortisone 1 % lotion Use over affected area twice daily 118 mL 0     ibuprofen (ADVIL,MOTRIN) 400 MG tablet TAKE 1 TABLET(400 MG) BY MOUTH THREE TIMES DAILY AS NEEDED FOR PAIN OR FEVER 30 tablet 0     PARoxetine (PAXIL) 20 MG tablet TAKE 2 TABLETS(40 MG) BY MOUTH EVERY MORNING 180 tablet 2     prenatal vit-iron fumarate-FA (PRENATAL VITAMIN) 27-0.8 mg Tab Take 1 tablet by mouth daily. 90 tablet 12     No current facility-administered medications on file prior to visit.           No results found for this or any previous visit (from the past 48 hour(s)).      Assessment/Plan:  1. Post-traumatic Stress Disorder  I reviewed the possibility of starting prazosin with the patient to help decrease her nightmares.  She was very clear that  she does not want any medication that will make her more sleepy.  I reassured her that this medication would not have that effect.  She declined to start it at this time.  I told her it would be waiting for if she changed her mind.  Due to the late nature of her arrival.  We are unable to get her set up with the specialty  today to set up a psychology visit.  She does have an upcoming visit with me on 6/3/2019.  I did talk with her about making sure that we get her set up with psychotherapy at that time.  Also consider psychiatric referral at that time.  - prazosin (MINIPRESS) 1 MG capsule; Take 1 capsule (1 mg total) by mouth at bedtime.  Dispense: 30 capsule; Refill: 12    2. Anxiety Disorder Due To General Medical Condition With Panic Attacks    - Thyroid Cascade  - HM1(CBC and Differential)  - HM1 (CBC with Diff)    3. Overweight    - Comprehensive Metabolic Panel  - HM1(CBC and Differential)  - HM1 (CBC with Diff)      The entire conversation today was conducted through the use of a professional .  Our time today was limited by availability of an       Danielle Nair   5/28/2019 4:52 PM

## 2021-05-29 NOTE — TELEPHONE ENCOUNTER
----- Message from Danielle Nair MD sent at 5/31/2019  4:50 PM CDT -----  Please call pt with an  and let them know that their lab results show a slightly elevated blood sugar. I recommend that she make some diet changes to exclude rice, bread, noodles, sugar, and to work on increasing her exercise.  We should recheck her blood sugar fasting at her physical .

## 2021-05-29 NOTE — TELEPHONE ENCOUNTER
----- Message from Genny Upton sent at 5/20/2019  3:18 PM CDT -----  Regarding: FW: status of therapist and [facility] worker  Dr. Lombardi,    Unable to get a hold of patient after several phone attempts.    Genny Upton  05.20.19  ----- Message -----  From: Delta Benavides  Sent: 5/16/2019   3:41 PM  To: Genny Upton  Subject: RE: status of therapist and [facility] worker         Jose Ramon Royal,    This is not a [facility] patient.  I will not be able to call this patient.    Thank you for [facility] me.  I hope you guys can continue assist and if  staff can assist as well.    Due my work load and falling behind, I apologize for not able help during this time.    -Delta  ----- Message -----  From: Genny Upton  Sent: 5/15/2019   2:12 PM  To: Marychuy Lombardi MD, Delta Benavides, #  Subject: RE: status of therapist and [facility] worker         Radha left a message on voicemail regarding on-going client of theirs.  stating that her and the Therapist Antonia Duenas have attempted to call patient multiple times a week and have not been able to get a hold of her to schedule.     I also CC Delta to see if she has a way of getting a hold of her.     Genny Upton  05.15.19  ----- Message -----  From: Marychuy Lombardi MD  Sent: 5/14/2019   5:00 PM  To: [facility]  Subject: status of therapist and [facility] worker             Can you check on the status of the referral for status of therapist and [facility] worker?    Thanks  Dr. Marychuy Lombardi  5/14/2019

## 2021-05-30 VITALS — HEIGHT: 62 IN | WEIGHT: 184.5 LBS | BODY MASS INDEX: 33.95 KG/M2

## 2021-05-30 VITALS — HEIGHT: 61 IN | WEIGHT: 180.8 LBS | BODY MASS INDEX: 34.14 KG/M2

## 2021-05-30 VITALS — BODY MASS INDEX: 34.04 KG/M2 | HEIGHT: 62 IN | WEIGHT: 185 LBS

## 2021-05-31 ENCOUNTER — RECORDS - HEALTHEAST (OUTPATIENT)
Dept: ADMINISTRATIVE | Facility: CLINIC | Age: 36
End: 2021-05-31

## 2021-05-31 VITALS — BODY MASS INDEX: 35.88 KG/M2 | WEIGHT: 195 LBS | HEIGHT: 62 IN

## 2021-05-31 VITALS — BODY MASS INDEX: 34.6 KG/M2 | WEIGHT: 188 LBS | HEIGHT: 62 IN

## 2021-05-31 VITALS — WEIGHT: 201 LBS | BODY MASS INDEX: 36.99 KG/M2 | HEIGHT: 62 IN

## 2021-06-01 ENCOUNTER — COMMUNICATION - HEALTHEAST (OUTPATIENT)
Dept: NURSING | Facility: CLINIC | Age: 36
End: 2021-06-01

## 2021-06-01 ENCOUNTER — AMBULATORY - HEALTHEAST (OUTPATIENT)
Dept: NURSING | Facility: CLINIC | Age: 36
End: 2021-06-01

## 2021-06-01 ENCOUNTER — RECORDS - HEALTHEAST (OUTPATIENT)
Dept: ADMINISTRATIVE | Facility: CLINIC | Age: 36
End: 2021-06-01

## 2021-06-01 VITALS — WEIGHT: 196 LBS | HEIGHT: 62 IN | BODY MASS INDEX: 36.07 KG/M2

## 2021-06-01 VITALS — WEIGHT: 190 LBS | BODY MASS INDEX: 34.96 KG/M2 | HEIGHT: 62 IN

## 2021-06-01 DIAGNOSIS — F33.1 MODERATE EPISODE OF RECURRENT MAJOR DEPRESSIVE DISORDER (H): ICD-10-CM

## 2021-06-01 DIAGNOSIS — F60.3 BORDERLINE PERSONALITY DISORDER (H): ICD-10-CM

## 2021-06-01 DIAGNOSIS — F06.4 ANXIETY DISORDER DUE TO MEDICAL CONDITION: ICD-10-CM

## 2021-06-01 DIAGNOSIS — F43.10 POSTTRAUMATIC STRESS DISORDER: ICD-10-CM

## 2021-06-01 DIAGNOSIS — F42.8 OTHER OBSESSIVE-COMPULSIVE DISORDERS: ICD-10-CM

## 2021-06-01 NOTE — TELEPHONE ENCOUNTER
Refill Approved    Rx renewed per Medication Renewal Policy. Medication was last renewed on 1/16/2019         [facility]     Requested Prescriptions   Pending Prescriptions Disp Refills     PARoxetine (PAXIL) 20 MG tablet [Pharmacy Med Name: PAROXETINE 20MG TABLETS] 180 tablet 0     Sig: TAKE 2 TABLETS(40 MG) BY MOUTH EVERY MORNING       [facility] Refill Protocol  Passed - 9/28/2019  7:13 PM        Passed - PCP or prescribing provider visit in last year     Last office visit with prescriber/PCP: 5/28/2019 Danielle Nair MD OR same dept: 5/28/2019 Danielle Nair MD OR same specialty: 5/28/2019 Danielle Nair MD  Last physical: Visit date not found Last MTM visit: Visit date not found   Next visit within 3 mo: Visit date not found  Next physical within 3 mo: Visit date not found  Prescriber OR PCP: Danielle Nair MD  Last diagnosis associated with med order: 1. Depressed  - PARoxetine (PAXIL) 20 MG tablet [Pharmacy Med Name: PAROXETINE 20MG TABLETS]; TAKE 2 TABLETS(40 MG) BY MOUTH EVERY MORNING  Dispense: 180 tablet; Refill: 0    If protocol passes may refill for 12 months if within 3 months of last provider visit (or a total of 15 months).

## 2021-06-01 NOTE — TELEPHONE ENCOUNTER
Lvm to call clinic back. If she calls back let her know she is due for an annual physical and assist with scheduling as necessary. Thanks

## 2021-06-02 VITALS — WEIGHT: 192 LBS | BODY MASS INDEX: 35.33 KG/M2 | HEIGHT: 62 IN

## 2021-06-02 VITALS — BODY MASS INDEX: 38.05 KG/M2 | HEIGHT: 62 IN | WEIGHT: 206.75 LBS

## 2021-06-02 NOTE — TELEPHONE ENCOUNTER
Refill Approved    Rx renewed per Medication Renewal Policy. Medication was last renewed on 1/7/19 .    Tanya Becerra, [facility] Triage/Med Refill 10/21/2019     Requested Prescriptions   Pending Prescriptions Disp Refills     gabapentin (NEURONTIN) 300 MG capsule 270 capsule 2     Sig: Take 1 capsule (300 mg total) by mouth 3 (three) times a day.       Gabapentin/Levetiracetam/Tiagabine Refill Protocol  Passed - 10/21/2019  1:49 PM        Passed - PCP or prescribing provider visit in past 12 months or next 3 months     Last office visit with prescriber/PCP: 5/28/2019 Danielle Nair MD OR same dept: 5/28/2019 Danielle Nair MD OR same specialty: 5/28/2019 Danielle Nair MD  Last physical: Visit date not found Last MTM visit: Visit date not found   Next visit within 3 mo: Visit date not found  Next physical within 3 mo: Visit date not found  Prescriber OR PCP: Danielle Nair MD  Last diagnosis associated with med order: 1. Chronic Pain  - gabapentin (NEURONTIN) 300 MG capsule; Take 1 capsule (300 mg total) by mouth 3 (three) times a day.  Dispense: 270 capsule; Refill: 2    If protocol passes may refill for 12 months if within 3 months of last provider visit (or a total of 15 months).

## 2021-06-03 ENCOUNTER — COMMUNICATION - HEALTHEAST (OUTPATIENT)
Dept: NURSING | Facility: CLINIC | Age: 36
End: 2021-06-03

## 2021-06-03 ENCOUNTER — COMMUNICATION - HEALTHEAST (OUTPATIENT)
Dept: CARE COORDINATION | Facility: CLINIC | Age: 36
End: 2021-06-03

## 2021-06-03 VITALS — WEIGHT: 206 LBS | HEIGHT: 62 IN | BODY MASS INDEX: 37.91 KG/M2

## 2021-06-03 NOTE — PROGRESS NOTES
ASSESSMENT/PLAN:   Najma Alexandra was seen today for dizziness, nausea and headache.    Diagnoses and all orders for this visit:    Need for immunization against influenza  -     Influenza, Recombinant, Inj, Quadrivalent, PF, 18+YRS    Moderate episode of recurrent major depressive disorder (H)  Long history of depression, no homicidal or suicidal ideations.  She does endorse some auditory hallucinations, but unclear how much of that is cultural.  She denies any command hallucinations.  She is been on paroxetine for years and discussed with her PCP if any adjustments are going to be made.  This is a thing that makes her feel the best is working, encouraged her to go more often.  -     PHQ9 Depression Screen, score 8    Elevated random blood glucose level  Patient has had 2 random glucoses above normal, one was 135 and paramedics reported 160 at her ER visit.  A1c checked today was 5.1.  Patient does not have diabetes.  -     Glycosylated Hemoglobin A1c    ER follow-up.  Patient was referred for neurology with concern for seizures versus pseudoseizures.  Patient wants to discuss this with her PCP before going forward, she does not remember receiving this referral at all.        - Discuss neurology referral with PCP        Return in about 1 month (around 12/21/2019).       =================================================  SUBJECTIVE  Najma Alexandra Nah is a 34 y.o. female here for overall not feeling well.     Patient is following up from an ER visit on 11/1/2019 for syncope/near syncope.    Patient is that she was at work and was feeling lightheaded and mentioned it to a friend.  She felt very stressed and that triggered some dizziness, she lost consciousness and does not remember anything until she was at the hospital.  Friend reported that she had shaking motion of her extremities that lasted about 10 minutes.  She was referred to a neurologist at that visit.  All lab work-up was unremarkable except for glucose of 160 with  "paramedics.  Blood glucose was normal in the ER.    Long history with depression and used to see a therapist but no longer goes.  She does work out at the [facility] when she has a chance but it is hard to go when she has to take care of her children.  She says she has been struggling with this for years, has been taking paroxetine which helps calm her.  These incidents happen 1-2 times per week, has not been increasing in frequency or severity.  She is here today because she was told to follow-up after her ER visit.    She said that she was told she had diabetes by the paramedics.  However there is no evidence of that in her chart.  They did report a random glucose of 160 with paramedics.    She denies any homicidal or suicidal ideations.  She has had thoughts in the past but not recently.  She is recently  or  from her .  She does say that she hears voices yelling at her when she is very stressed out, usually right before she passes out.  Sometimes they are positive.  She denies any command hallucinations.  She denies any visual hallucinations.    ROS  Complete 10 point review of systems negative except as noted above in HPI    Reviewed Past Medical History, Medications, Family History and Social History in Epic and up to date with no new changes.    OBJECTIVE  BP 96/62   Pulse 74   Temp 98.6  F (37  C) (Oral)   Resp 16   Ht 5' 1.5\" (1.562 m)   Wt 183 lb (83 kg)   LMP 11/15/2019 (Exact Date)   SpO2 98%   Breastfeeding No   BMI 34.02 kg/m       General: Cooperative, pleasant, in no acute distress  HEENT: PERRL, EOMI, conjunctiva clear.  Neck: no lymphadenopathy, no masses  CV: RRR, normal S1/S2, no murmur, rubs, gallops  Resp: No respiratory distress. Clear to auscultation bilaterally. No wheezes, rales, rhonchi  Neuro: Cranial nerves II through XII intact  Psych: No suicidal or homicidal ideations.    LABS & IMAGES   Results for orders placed or performed in visit on 11/21/19 "   Glycosylated Hemoglobin A1c   Result Value Ref Range    Hemoglobin A1c 5.1 3.5 - 6.0 %       =================================================    Visit was completed along with in person Noemi     Options for treatment and follow-up care were reviewed with the patient. Najma Rodriguez and/or guardian was engaged and actively involved in the decision making process. Najma Rodriguez and/or guardian verbalized understanding of the options discussed and was satisfied with the final plan.      Jacqueline Metcalf MD

## 2021-06-04 VITALS
OXYGEN SATURATION: 99 % | RESPIRATION RATE: 20 BRPM | WEIGHT: 185.25 LBS | HEART RATE: 75 BPM | HEIGHT: 61 IN | TEMPERATURE: 98.6 F | BODY MASS INDEX: 34.98 KG/M2 | DIASTOLIC BLOOD PRESSURE: 70 MMHG | SYSTOLIC BLOOD PRESSURE: 106 MMHG

## 2021-06-04 VITALS
OXYGEN SATURATION: 100 % | SYSTOLIC BLOOD PRESSURE: 115 MMHG | WEIGHT: 179.7 LBS | TEMPERATURE: 98.8 F | HEART RATE: 72 BPM | RESPIRATION RATE: 12 BRPM | BODY MASS INDEX: 33.4 KG/M2 | DIASTOLIC BLOOD PRESSURE: 80 MMHG

## 2021-06-04 VITALS
WEIGHT: 179.06 LBS | OXYGEN SATURATION: 97 % | TEMPERATURE: 98.3 F | BODY MASS INDEX: 32.95 KG/M2 | HEART RATE: 75 BPM | SYSTOLIC BLOOD PRESSURE: 112 MMHG | DIASTOLIC BLOOD PRESSURE: 76 MMHG | RESPIRATION RATE: 16 BRPM | HEIGHT: 62 IN

## 2021-06-04 VITALS
WEIGHT: 183 LBS | RESPIRATION RATE: 16 BRPM | DIASTOLIC BLOOD PRESSURE: 62 MMHG | SYSTOLIC BLOOD PRESSURE: 96 MMHG | TEMPERATURE: 98.6 F | HEART RATE: 74 BPM | BODY MASS INDEX: 33.68 KG/M2 | HEIGHT: 62 IN | OXYGEN SATURATION: 98 %

## 2021-06-05 VITALS
HEART RATE: 78 BPM | BODY MASS INDEX: 34.83 KG/M2 | RESPIRATION RATE: 17 BRPM | SYSTOLIC BLOOD PRESSURE: 106 MMHG | DIASTOLIC BLOOD PRESSURE: 72 MMHG | WEIGHT: 184.5 LBS | TEMPERATURE: 99 F | HEIGHT: 61 IN | OXYGEN SATURATION: 97 %

## 2021-06-05 VITALS
BODY MASS INDEX: 34.98 KG/M2 | DIASTOLIC BLOOD PRESSURE: 60 MMHG | SYSTOLIC BLOOD PRESSURE: 100 MMHG | WEIGHT: 185.25 LBS | TEMPERATURE: 98.3 F | HEIGHT: 61 IN | OXYGEN SATURATION: 96 % | HEART RATE: 84 BPM | RESPIRATION RATE: 18 BRPM

## 2021-06-05 VITALS
OXYGEN SATURATION: 98 % | BODY MASS INDEX: 35.92 KG/M2 | HEART RATE: 97 BPM | SYSTOLIC BLOOD PRESSURE: 108 MMHG | WEIGHT: 190.12 LBS | DIASTOLIC BLOOD PRESSURE: 64 MMHG | TEMPERATURE: 98.4 F

## 2021-06-05 NOTE — TELEPHONE ENCOUNTER
Patient is due for physical/pap. Called patient and left VM. If patient calls back, please help with schedule appointment with Dr. Nair.

## 2021-06-05 NOTE — PROGRESS NOTES
Chief Complaint   Patient presents with     Fever     x2d, stomach ache       HPI:  Najma Rodriguez is a 34 y.o. female who presents today complaining of 2 days of stomache upset, nausea and diarrhea for one day with fever to 100.0F. Patient notes son with similar symptoms at home. Reports taking tylenol OTC with some relief.     History obtained from the patient.  LMP: 1/20/2020      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)  2015-02: Obsessive compulsive disorder  Functional Murmur  Staring Spells (Reported)  Vitamin D deficiency  Moderate episode of recurrent major depressive disorder (H)  Sciatica  Borderline personality disorder (H)  Chronic Tension-type Headache  Anxiety Disorder Due To General Medical Condition With Panic Attacks  Chronic Pain  Lower Back Pain  Neuralgia  Post-traumatic Stress Disorder  Gynecologic Services Intrauterine Device (IUD)  Latent Tuberculosis      Past Medical History:   Diagnosis Date     Anxiety disorder      Borderline personality disorder (H)      Chronic tension type headache      Depression      Functional murmur      Neuralgia      OCD (obsessive compulsive disorder)      Post traumatic stress disorder (PTSD)      Sciatica      Vitamin D deficiency        Social History     Tobacco Use     Smoking status: Never Smoker     Smokeless tobacco: Never Used   Substance Use Topics     Alcohol use: No       Review of Systems   Constitutional: Positive for activity change, appetite change, chills, fatigue and fever.   HENT: Positive for congestion and rhinorrhea. Negative for ear pain and sore throat.    Respiratory: Negative for cough and wheezing.    Gastrointestinal: Positive for abdominal pain, diarrhea and nausea. Negative for blood in stool and vomiting.   Genitourinary: Negative for dysuria.   Musculoskeletal: Positive for myalgias.   Neurological: Positive for headaches.   All other systems reviewed and are negative.      Vitals:    02/03/20 0956   BP: 112/76  "  Patient Site: Right Arm   Patient Position: Sitting   Cuff Size: Adult Regular   Pulse: 75   Resp: 16   Temp: 98.3  F (36.8  C)   TempSrc: Oral   SpO2: 97%   Weight: 179 lb 1 oz (81.2 kg)   Height: 5' 1.5\" (1.562 m)       Physical Exam  Constitutional:       Appearance: She is ill-appearing. She is not toxic-appearing or diaphoretic.   HENT:      Right Ear: Tympanic membrane, ear canal and external ear normal. There is no impacted cerumen.      Left Ear: Tympanic membrane, ear canal and external ear normal. There is no impacted cerumen.      Nose: Congestion and rhinorrhea present.      Mouth/Throat:      Mouth: Mucous membranes are moist.      Pharynx: No oropharyngeal exudate or posterior oropharyngeal erythema.   Neck:      Musculoskeletal: Neck supple.   Cardiovascular:      Rate and Rhythm: Normal rate and regular rhythm.      Pulses: Normal pulses.      Heart sounds: Normal heart sounds. No murmur. No friction rub. No gallop.    Pulmonary:      Effort: Pulmonary effort is normal.      Breath sounds: Normal breath sounds. No wheezing or rhonchi.   Abdominal:      General: There is no distension.      Palpations: Abdomen is soft. There is no mass.      Tenderness: There is no abdominal tenderness. There is no right CVA tenderness, left CVA tenderness, guarding or rebound.      Comments: Increased bowel sounds throughout all quadrants   Lymphadenopathy:      Cervical: No cervical adenopathy.   Skin:     General: Skin is warm.      Capillary Refill: Capillary refill takes less than 2 seconds.      Coloration: Skin is not pale.      Findings: No rash.   Neurological:      General: No focal deficit present.      Mental Status: She is alert and oriented to person, place, and time. Mental status is at baseline.   Psychiatric:         Mood and Affect: Mood normal.         Behavior: Behavior normal.         No notes on file    Labs:  Recent Results (from the past 72 hour(s))   Influenza A/B Rapid Test- Nasal Swab "   Result Value Ref Range    Influenza  A, Rapid Antigen No Influenza A antigen detected No Influenza A antigen detected    Influenza B, Rapid Antigen No Influenza B antigen detected No Influenza B antigen detected       Radiology:No results found.    Clinical Decision Making: At the end of the encounter, I discussed results, diagnosis, medications. Discussed red flags for immediate return to clinic/ER, as well as indications for follow up if no improvement. Letter for work provided. Patient understood and agreed to plan.     ALY Grey, CNP       1. Viral gastroenteritis     2. Fever in adult  Influenza A/B Rapid Test- Nasal Swab         There are no Patient Instructions on file for this visit.

## 2021-06-05 NOTE — PROGRESS NOTES
Chief Complaint   Patient presents with     GI Problem     stomach aches, chills, runny nose, x 2 weeks          HPI:    Patient is here for the following issues:    #1. Abdominal pain- x 2 wks, epigastric, moderate intermittent, now at 4/10, hard to describe, no relationship to food. No nausea, vomiting, diarrhea, constipation.    #2. Runny nose - x 2 wks, with intermittent chills, and intermittent sore throat. No cough.     ROS: Pertinent ROS noted in HPI.   Allergies   Allergen Reactions     No Known Drug Allergies        Patient Active Problem List   Diagnosis     Functional Murmur     Staring Spells (Reported)     Vitamin D deficiency     Moderate episode of recurrent major depressive disorder (H)     Sciatica     Borderline personality disorder (H)     Chronic Tension-type Headache     Anxiety Disorder Due To General Medical Condition With Panic Attacks     Chronic Pain     Lower Back Pain     Neuralgia     Post-traumatic Stress Disorder     Latent Tuberculosis     Obsessive compulsive disorder     Calcified granuloma of lung (H)       Family History   Problem Relation Age of Onset     Stroke Sister      Mental illness Daughter         cutting and suicidal ideation       Social History     Socioeconomic History     Marital status:      Spouse name: Not on file     Number of children: Not on file     Years of education: Not on file     Highest education level: Not on file   Occupational History     Not on file   Social Needs     Financial resource strain: Not on file     Food insecurity:     Worry: Not on file     Inability: Not on file     Transportation needs:     Medical: Not on file     Non-medical: Not on file   Tobacco Use     Smoking status: Never Smoker     Smokeless tobacco: Never Used   Substance and Sexual Activity     Alcohol use: No     Drug use: No     Sexual activity: Never   Lifestyle     Physical activity:     Days per week: Not on file     Minutes per session: Not on file     Stress:  Not on file   Relationships     Social connections:     Talks on phone: Not on file     Gets together: Not on file     Attends Jew service: Not on file     Active member of club or organization: Not on file     Attends meetings of clubs or organizations: Not on file     Relationship status: Not on file     Intimate partner violence:     Fear of current or ex partner: Not on file     Emotionally abused: Not on file     Physically abused: Not on file     Forced sexual activity: Not on file   Other Topics Concern     Not on file   Social History Narrative     Not on file       Past Surgical History:   Procedure Laterality Date     IN  DELIVERY ONLY      Description:  Section Low Transverse;  Recorded: 2010;  Comments: for failure to progress.  macrosomic infant.         Objective:    Vitals:    20 1349   BP: 115/80   Pulse: 72   Resp: 12   Temp: 98.8  F (37.1  C)   SpO2: 100%       Gen:NAD  Throat: oropharynx clear, tonsils normal  Ears: TMs clear without effusion, ear canals normal with small cerumen  Nose: no discharge  Neck: No adenopathy  CV: RRR, normal S1S2, no M, R, G  Pulm: CTAB, normal effort  Abd: normal inspection, normal bowel sounds, soft, mild pain to palpation of epigastric area (no pain without palpation), no mass/HSM  Skin: dry, warm, no acute lesions    Recent Results (from the past 24 hour(s))   Rapid Strep A Screen-Throat swab   Result Value Ref Range    Rapid Strep A Antigen No Group A Strep detected, presumptive negative No Group A Strep detected, presumptive negative         Epigastric abdominal pain - symptoms improved with GI Cocktail. Rule out PUD. Will prescribed Pepcid. Close f/u as directed.   -     aluminum-magnesium hydroxide-simethicone 15 mL, viscous lidocaine HC 15 mL (GI COCKTAIL)  -     famotidine (FOR PEPCID) 10 MG tablet; Take 2 tablets (20 mg total) by mouth 2 (two) times a day.    Throat pain - negative RST, likely viral.   -     Rapid Strep A  Screen-Throat swab  -     Group A Strep, RNA Direct Detection, Throat

## 2021-06-08 NOTE — PROGRESS NOTES
"    Najma Rodriguez is a 34 y.o. female who is being evaluated via a billable telephone visit.      The patient has been notified of following:     \"This telephone visit will be conducted via a call between you and your physician/provider. We have found that certain health care needs can be provided without the need for a physical exam.  This service lets us provide the care you need with a short phone conversation.  If a prescription is necessary we can send it directly to your pharmacy.  If lab work is needed we can place an order for that and you can then stop by our lab to have the test done at a later time.    Telephone visits are billed at different rates depending on your insurance coverage. During this emergency period, for some insurers they may be billed the same as an in-person visit.  Please reach out to your insurance provider with any questions.    If during the course of the call the physician/provider feels a telephone visit is not appropriate, you will not be charged for this service.\"    Patient has given verbal consent to a Telephone visit? Yes    Patient would like to receive their AVS by AVS Preference: Mail a copy.    Chief Complaint   Patient presents with     Follow-up     ED visit [facility] 5/16/2020 positive COVID          Subjective:     Najma Rodriguez is a 34 y.o. female who calls with complaint requesting tylenol  Patient was evaluated by telephone rather than an in person visit due to currently outbreak of COVID-19.    The following portions of the patient's history were reviewed and updated as appropriate: allergies, current medications and problem list     HPI:   Najma Rodriguez is a 34 y.o. female diagnosed with Covid 19 on 5/16/2020 At [facility].  She is requesting a prescription for tylenol.  Continues with achiness.  Temp is 99.  No cough.  Has diarrhea.    People in household:  3 children: 15, 13, 9  Two older children are sick.    Review of Systems:  Remainder of complete review systems is " negative.     Patient Active Problem List   Diagnosis     Functional Murmur     Staring Spells (Reported)     Vitamin D deficiency     Moderate episode of recurrent major depressive disorder (H)     Sciatica     Borderline personality disorder (H)     Chronic Tension-type Headache     Anxiety Disorder Due To General Medical Condition With Panic Attacks     Chronic Pain     Lower Back Pain     Neuralgia     Post-traumatic Stress Disorder     Latent Tuberculosis     Obsessive compulsive disorder     Calcified granuloma of lung (H)          Current Outpatient Medications:      acetaminophen (TYLENOL) 325 MG tablet, Take 2 tablets (650 mg total) by mouth 3 (three) times a day as needed for pain., Disp: 300 tablet, Rfl: 1     albuterol (PROVENTIL HFA;VENTOLIN HFA) 90 mcg/actuation inhaler, Inhale 2 puffs every 6 (six) hours as needed for wheezing., Disp: , Rfl:      cholecalciferol, vitamin D3, 125 mcg (5,000 unit) capsule, TAKE 1 CAPSULE BY MOUTH DAILY, Disp: 90 capsule, Rfl: 3     famotidine (FOR PEPCID) 10 MG tablet, Take 2 tablets (20 mg total) by mouth 2 (two) times a day., Disp: 30 tablet, Rfl: 0     gabapentin (NEURONTIN) 300 MG capsule, Take 1 capsule (300 mg total) by mouth 3 (three) times a day., Disp: 270 capsule, Rfl: 1     hydrocortisone 1 % lotion, Use over affected area twice daily, Disp: 118 mL, Rfl: 0     ibuprofen (ADVIL,MOTRIN) 400 MG tablet, TAKE 1 TABLET(400 MG) BY MOUTH THREE TIMES DAILY AS NEEDED FOR PAIN OR FEVER, Disp: 30 tablet, Rfl: 0     PARoxetine (PAXIL) 20 MG tablet, TAKE 2 TABLETS(40 MG) BY MOUTH EVERY MORNING, Disp: 180 tablet, Rfl: 2     prazosin (MINIPRESS) 1 MG capsule, Take 1 capsule (1 mg total) by mouth at bedtime., Disp: 30 capsule, Rfl: 12     prenatal vit-iron fumarate-FA (PRENATAL VITAMIN) 27-0.8 mg Tab, Take 1 tablet by mouth daily., Disp: 90 tablet, Rfl: 12     Allergies  No known drug allergies        Tobacco Use      Smoking status: Never Smoker      Smokeless tobacco: Never  Used        Objective     Vitals (patient-reported): LMP 05/10/2020 (Exact Date)      Gen:  Voice and speech are normal.  Resp: Able to speak in full sentences.    Assessment/Plan:     1. 2019 novel coronavirus disease (COVID-19)  Reviewed note from Thomas SUMMERS.  Patient is improving somewhat  Eating and drinking ok.    Rx for tylenol sent.  She will call to see if they can deliver or if someone can pick it up for her.  - acetaminophen (TYLENOL) 325 MG tablet; Take 2 tablets (650 mg total) by mouth 3 (three) times a day as needed for pain.  Dispense: 300 tablet; Refill: 1  - COVID-19 [facility] Referral         Return if symptoms worsen or fail to improve.     Time visit started: 4:30 PM    Time visit ended: 4:39 PM   Phone call duration:  8 minutes  37131 (</= 10 min)  07122 (11-20 min)  23197 (21-30 min)        Henrique Connolyl MD

## 2021-06-08 NOTE — TELEPHONE ENCOUNTER
Faxing letter to pt employer as requested.  Called pt to relay letter being faxed.  If any questions, please call.

## 2021-06-08 NOTE — TELEPHONE ENCOUNTER
I wrote the letter for the patient.  I assume she meant 6/8/2020 to return to work.  Please let me know if that is not correct.

## 2021-06-08 NOTE — TELEPHONE ENCOUNTER
Who is requesting the letter?    Patient    Why is the letter needed?   Patient was unable to work from 05/16/2020-06/05/2020  due positive Covid.  May return to work 05/08/2020 without restrictions.      How would you like this letter returned?   Attn: Beata EDWARDS  Fax: [phone]    Okay to leave a detailed message? Yes    Please dictate letter and fax to employer.

## 2021-06-08 NOTE — PROGRESS NOTES
"Najma Rodriguez is a 34 y.o. female who is being evaluated via a billable telephone visit.      The patient has been notified of following:     \"This telephone visit will be conducted via a call between you and your physician/provider. We have found that certain health care needs can be provided without the need for a physical exam.  This service lets us provide the care you need with a short phone conversation.  If a prescription is necessary we can send it directly to your pharmacy.  If lab work is needed we can place an order for that and you can then stop by our lab to have the test done at a later time.    Telephone visits are billed at different rates depending on your insurance coverage. During this emergency period, for some insurers they may be billed the same as an in-person visit.  Please reach out to your insurance provider with any questions.    If during the course of the call the physician/provider feels a telephone visit is not appropriate, you will not be charged for this service.\"    Patient has given verbal consent to a Telephone visit? Yes    What phone number would you like to be contacted at? [phone]    Patient would like to receive their AVS by AVS Preference: Mail a copy.    S:  Najma Rodriguez is a 34 y.o. female who joins a virtual visit today for   Chief Complaint   Patient presents with     covid    she feels quite dizzy, worse when she lays down.    This started when she has covid in 5/17.  Her daughter was positive for covid.    She says that overall, she is much better than she was.  Her fever is gone.  She feels like she is very tired.  Her body still aches.  She feels dizzy when she stands up from sitting for a long time.  If she just stands for a bit, the feeling goes away    No pain in ears.  She has some dizziness with rolling over.    She has a mild cough.  No sobr.  No pain in her chest.  She has a lot of joint pain.    She does not have any tylenol at home . Yesterday she went " to all the pharmacies and was unable to get any meds.  She has not had any tylenol at home .   All of her kids had Covid 19.  Her daughter had covid pneumonia.    No trouble speaking, no difficulty with falls . No trouble finding her words or speaking.  Yesterday she had a headache, but it is gone now.    No n/t/w in any one part of her body.      I reviewed the pertinent family, social, surgical, medical history.        O:  LMP 05/10/2020 (Exact Date)   Gen:  nad  No labored breathing.  Able to speak in complete sentences .  No wheezing noted.      Patient Active Problem List   Diagnosis     Functional Murmur     Staring Spells (Reported)     Vitamin D deficiency     Moderate episode of recurrent major depressive disorder (H)     Sciatica     Borderline personality disorder (H)     Chronic Tension-type Headache     Anxiety Disorder Due To General Medical Condition With Panic Attacks     Chronic Pain     Lower Back Pain     Neuralgia     Post-traumatic Stress Disorder     Latent Tuberculosis     Obsessive compulsive disorder     Calcified granuloma of lung (H)     Current Outpatient Medications on File Prior to Visit   Medication Sig Dispense Refill     acetaminophen (TYLENOL) 325 MG tablet Take 2 tablets (650 mg total) by mouth 3 (three) times a day as needed for pain. 300 tablet 1     albuterol (PROVENTIL HFA;VENTOLIN HFA) 90 mcg/actuation inhaler Inhale 2 puffs every 6 (six) hours as needed for wheezing.       cholecalciferol, vitamin D3, 125 mcg (5,000 unit) capsule TAKE 1 CAPSULE BY MOUTH DAILY 90 capsule 3     famotidine (FOR PEPCID) 10 MG tablet Take 2 tablets (20 mg total) by mouth 2 (two) times a day. 30 tablet 0     gabapentin (NEURONTIN) 300 MG capsule Take 1 capsule (300 mg total) by mouth 3 (three) times a day. 270 capsule 1     hydrocortisone 1 % lotion Use over affected area twice daily 118 mL 0     ibuprofen (ADVIL,MOTRIN) 400 MG tablet TAKE 1 TABLET(400 MG) BY MOUTH THREE TIMES DAILY AS NEEDED FOR  PAIN OR FEVER 30 tablet 0     PARoxetine (PAXIL) 20 MG tablet TAKE 2 TABLETS(40 MG) BY MOUTH EVERY MORNING 180 tablet 2     prazosin (MINIPRESS) 1 MG capsule Take 1 capsule (1 mg total) by mouth at bedtime. 30 capsule 12     prenatal vit-iron fumarate-FA (PRENATAL VITAMIN) 27-0.8 mg Tab Take 1 tablet by mouth daily. 90 tablet 12     No current facility-administered medications on file prior to visit.           No results found for this or any previous visit (from the past 48 hour(s)).     No images are attached to the encounter or orders placed in the encounter.       Assessment/Plan:  1. Dizziness  To ER if anyworsening signs of stroke, these were reviewed today with the patient.    - loratadine (CLARITIN) 10 mg tablet; Take 1 tablet (10 mg total) by mouth daily.  Dispense: 30 tablet; Refill: 11    2. 2019 novel coronavirus disease (COVID-19)  Take extra liquids.   Take a hot bath.    Return if fever, sobr, chest pain.  Worsening sx.    - acetaminophen (TYLENOL) 325 MG tablet; Take 2 tablets (650 mg total) by mouth 3 (three) times a day as needed for pain.  Dispense: 300 tablet; Refill: 1    3. Clinical diagnosis of COVID-19          Danielle Guerda Nair   5/29/2020 2:21 PM         Phone call duration:  13 minutes    Marcie Cheung [facility]

## 2021-06-08 NOTE — PROGRESS NOTES
"Najma Rodriguez is a 34 y.o. female who is being evaluated via a billable telephone visit.      The patient has been notified of following:     \"This telephone visit will be conducted via a call between you and your physician/provider. We have found that certain health care needs can be provided without the need for a physical exam.  This service lets us provide the care you need with a short phone conversation.  If a prescription is necessary we can send it directly to your pharmacy.  If lab work is needed we can place an order for that and you can then stop by our lab to have the test done at a later time.    Telephone visits are billed at different rates depending on your insurance coverage. During this emergency period, for some insurers they may be billed the same as an in-person visit.  Please reach out to your insurance provider with any questions.    If during the course of the call the physician/provider feels a telephone visit is not appropriate, you will not be charged for this service.\"    Patient has given verbal consent to a Telephone visit? Yes    What phone number would you like to be contacted at? [phone]    Patient would like to receive their AVS by none    Additional provider notes: HE COVID-19 Follow Up Visit   How are you feeling today? Worse  In the past 24 hours have you had shortness of breath when speaking, walking, or climbing stairs? My breathing issues have improved  Do you have a cough? Yes, I have a cough but it's not worse  When is the last time you had a fever greater than 100? Feel hot but Temp 98  Are you having any other symptoms? Loss of appetite, Fatigue, Body aches and weak and dizzy \"like when I had anemia\"   Do you have any other stressors you would like to discuss with your provider? No            Patient seen at [facility] on 5/17/20 for fever, bodyaches and shortness of breath   Tested positive for COVID and sent home with tylenol  Per [facility] notes:  hgb 11.6, , " AST 89, prolactin 0.02, COVID positive    Today reports shortness of breath is better, no high fevers. However bodyaches and weakness are much worse.   She is not sure if she has tylenol or cough medicine at home.     Called pharmacy to confirm what meds were delivered to the house. I had called in extra tylenol and robitussin-DM to be delivered to her house last week when I spoke with her daughter who was also positive for COVID and [facility].   Daughter has improved and feeling much better.     Assessment/Plan:    COVD f/u plan  No fever, shortness of breath, chest pain   Sx of bodyaches, weakness   Plan -   Symptom management with tylenol and robitussin-dm (called pharmacy to deliver)  Advised hydration, MVI  Criteria to return to ER worsening shortness of breath or chest pain  F/u appt scheduled on 5/29 with PCP      Phone call duration: 25minutes    Dr. Marychuy Lombardi  5/26/2020

## 2021-06-08 NOTE — TELEPHONE ENCOUNTER
Patient Returning Call  Reason for call:  Pt stated she received a missed call   Information relayed to patient:  A message will be relayed   Patient has additional questions:  No  If YES, what are your questions/concerns:  n/a  Okay to leave a detailed message?: Yes

## 2021-06-08 NOTE — TELEPHONE ENCOUNTER
----- Message from Marychuy Lombardi MD sent at 5/26/2020  1:53 PM CDT -----  Regarding: plan  Please call patient and give her this info to f/u on the call we had this am.     Symptom management with tylenol and robitussin-dm (called pharmacy to deliver)  Advised hydration, MVI  Criteria to return to ER worsening shortness of breath or chest pain  F/u appt scheduled on 5/29 with PCP      Thanks,   Dr. Marychuy Lombardi  5/26/2020

## 2021-06-08 NOTE — TELEPHONE ENCOUNTER
RN cannot approve Refill Request    RN can NOT refill this medication Protocol failed and NO refill given. Last office visit: 5/28/2019 Danielle Nair MD Last Physical: Visit date not found Last MTM visit: Visit date not found Last visit same specialty: 11/21/2019 Jacqueline Metcalf MD.  Next visit within 3 mo: Visit date not found  Next physical within 3 mo: Visit date not found      Nely Torres, [facility] Triage/Med Refill 5/10/2020    Requested Prescriptions   Pending Prescriptions Disp Refills     cholecalciferol, vitamin D3, 125 mcg (5,000 unit) capsule [Pharmacy Med Name: VITAMIN D3 5,000 IU (ROB) CAP] 90 capsule 3     Sig: TAKE 1 CAPSULE BY MOUTH DAILY       There is no refill protocol information for this order

## 2021-06-09 NOTE — TELEPHONE ENCOUNTER
Refill Approved    Rx renewed per Medication Renewal Policy. Medication was last renewed on 10/21/19, last OV 5/29/20.    Nely Torres, [facility] Triage/Med Refill 7/18/2020     Requested Prescriptions   Pending Prescriptions Disp Refills     gabapentin (NEURONTIN) 300 MG capsule [Pharmacy Med Name: GABAPENTIN 300MG CAPSULES] 270 capsule 1     Sig: TAKE 1 CAPSULE(300 MG) BY MOUTH THREE TIMES DAILY       Gabapentin/Levetiracetam/Tiagabine Refill Protocol  Passed - 7/17/2020  6:53 AM        Passed - PCP or prescribing provider visit in past 12 months or next 3 months     Last office visit with prescriber/PCP: 5/28/2019 Danielle Nair MD OR same dept: 11/21/2019 Jacqueline Metcalf MD OR same specialty: 11/21/2019 Jacqueline Metcalf MD  Last physical: Visit date not found Last MTM visit: Visit date not found   Next visit within 3 mo: Visit date not found  Next physical within 3 mo: Visit date not found  Prescriber OR PCP: Danielle Nair MD  Last diagnosis associated with med order: 1. Chronic Pain  - gabapentin (NEURONTIN) 300 MG capsule [Pharmacy Med Name: GABAPENTIN 300MG CAPSULES]; TAKE 1 CAPSULE(300 MG) BY MOUTH THREE TIMES DAILY  Dispense: 270 capsule; Refill: 1    If protocol passes may refill for 12 months if within 3 months of last provider visit (or a total of 15 months).

## 2021-06-09 NOTE — TELEPHONE ENCOUNTER
Shortness of breath  Started 2 days ago.  No fever  Patient states she had covid 1 month ago, and now has shortness of breath again.    She reports, she is already back at work since having covid 19.  Patient advised to get retested again now and/or if she gets worse with shortness of breath to call .    Patient agreed with POC.    Cathie Pope RN  [facility] Triage/refill nurse      Reason for Disposition    COVID-19 Testing, questions about    COVID-19 Home Isolation, questions about    Protocols used: CORONAVIRUS (COVID-19) DIAGNOSED OR [id number] 5.16.20

## 2021-06-09 NOTE — TELEPHONE ENCOUNTER
----- Message from Danielle Nair MD sent at 7/20/2020  5:03 PM CDT -----  Please call pt with an  and let them know that their lab results show that she is anemic.  I will send in some iron for her to take daily.

## 2021-06-09 NOTE — PROGRESS NOTES
"S:  Najma Rodriguez is a 34 y.o. female who comes to the clinic today for  1.  Originally here for a physical, but does not want to do this today.  She does not want to do a pap smear today.    Instead she complains of sobr, mostly with walking or when she is sweating a lot .   This started before she go covid 19, but then later on it got worse. She has not had any albuterol at home.     Ros:  She complains of diffuse joint pains that come and go.  The other day she could not sleep in the day time, and was really tired.  When she occasionally does sleep in the da, she wakes up with palpitations.      She has some increased bruising.    Her menses are normal.  She has 1-2 days of very heavy bleeding, with leaking.  She has to use thick pads.  She falls down when she gets a bad shock.      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  When she goes back home and cooks, she does not want to siting down, she is busy and goes to the park most days.     I reviewed the pertinent family, social, surgical, medical history.    She is single.  No sexual partners  Wants to wait for her  to come back to her.      O:  /70 (Patient Site: Left Arm, Patient Position: Sitting, Cuff Size: Adult Regular)   Pulse 75   Temp 98.6  F (37  C) (Oral)   Resp 20   Ht 5' 1\" (1.549 m)   Wt 185 lb 4 oz (84 kg)   LMP 06/10/2020   SpO2 99%   Breastfeeding No   BMI 35.00 kg/m    Gen: no acute distress  Neck:  Supple, no lad, no thyromegaly or nodules.    Heart:  Regular rate and rhythm.  No m/r/g  Lungs: cta bilaterally, no wheezes or rhonchi.  Good air inspiration  Abdomen:  No masses or organomegaly  Extremities:  No edema.     Skin:  Mild bruising on left leg.    Genitourinary Exam:   Vulva: normal skin.  No lesions noted.  Nontender.    Urethral meatus: normal size and location, no lesions or discharge   Urethra: no tenderness or masses   Bladder: no fullness or tenderness   Vagina: normal " appearance, physiologic discharge. No evidence of cystocele or rectocele.   Cervix: normal appearance, no lesions, no cervical motion tenderness   Uterus: normal size and position, mobile, non-tender   Pap smear obtained: yes  Rectal exam: deferred   Psych;  No depression noted.  Fluent speech.          Patient Active Problem List   Diagnosis     Functional Murmur     Staring Spells (Reported)     Vitamin D deficiency     Moderate episode of recurrent major depressive disorder (H)     Sciatica     Borderline personality disorder (H)     Chronic Tension-type Headache     Anxiety Disorder Due To General Medical Condition With Panic Attacks     Chronic Pain     Lower Back Pain     Neuralgia     Post-traumatic Stress Disorder     Latent Tuberculosis     Obsessive compulsive disorder     Calcified granuloma of lung (H)     Current Outpatient Medications on File Prior to Visit   Medication Sig Dispense Refill     cholecalciferol, vitamin D3, 125 mcg (5,000 unit) capsule TAKE 1 CAPSULE BY MOUTH DAILY 90 capsule 3     famotidine (FOR PEPCID) 10 MG tablet Take 2 tablets (20 mg total) by mouth 2 (two) times a day. 30 tablet 0     gabapentin (NEURONTIN) 300 MG capsule TAKE 1 CAPSULE(300 MG) BY MOUTH THREE TIMES DAILY 270 capsule 2     hydrocortisone 1 % lotion Use over affected area twice daily 118 mL 0     ibuprofen (ADVIL,MOTRIN) 400 MG tablet TAKE 1 TABLET(400 MG) BY MOUTH THREE TIMES DAILY AS NEEDED FOR PAIN OR FEVER 30 tablet 0     loratadine (CLARITIN) 10 mg tablet Take 1 tablet (10 mg total) by mouth daily. 30 tablet 11     prazosin (MINIPRESS) 1 MG capsule Take 1 capsule (1 mg total) by mouth at bedtime. 30 capsule 12     prenatal vit-iron fumarate-FA (PRENATAL VITAMIN) 27-0.8 mg Tab Take 1 tablet by mouth daily. 90 tablet 12     [DISCONTINUED] acetaminophen (TYLENOL) 325 MG tablet Take 2 tablets (650 mg total) by mouth 3 (three) times a day as needed for pain. 300 tablet 1     [DISCONTINUED] albuterol (PROVENTIL  HFA;VENTOLIN HFA) 90 mcg/actuation inhaler Inhale 2 puffs every 6 (six) hours as needed for wheezing.       [DISCONTINUED] PARoxetine (PAXIL) 20 MG tablet TAKE 2 TABLETS(40 MG) BY MOUTH EVERY MORNING 180 tablet 2     No current facility-administered medications on file prior to visit.           Recent Results (from the past 48 hour(s))   HM1 (CBC with Diff)    Collection Time: 07/20/20  1:41 PM   Result Value Ref Range    WBC 7.8 4.0 - 11.0 thou/uL    RBC 4.75 3.80 - 5.40 mill/uL    Hemoglobin 11.8 (L) 12.0 - 16.0 g/dL    Hematocrit 35.5 35.0 - 47.0 %    MCV 75 (L) 80 - 100 fL    MCH 24.8 (L) 27.0 - 34.0 pg    MCHC 33.2 32.0 - 36.0 g/dL    RDW 13.2 11.0 - 14.5 %    Platelets 327 140 - 440 thou/uL    MPV 7.4 7.0 - 10.0 fL    Neutrophils % 60 50 - 70 %    Lymphocytes % 30 20 - 40 %    Monocytes % 7 2 - 10 %    Eosinophils % 3 0 - 6 %    Basophils % 0 0 - 2 %    Neutrophils Absolute 4.7 2.0 - 7.7 thou/uL    Lymphocytes Absolute 2.3 0.8 - 4.4 thou/uL    Monocytes Absolute 0.6 0.0 - 0.9 thou/uL    Eosinophils Absolute 0.2 0.0 - 0.4 thou/uL    Basophils Absolute 0.0 0.0 - 0.2 thou/uL        No images are attached to the encounter or orders placed in the encounter.       Assessment/Plan:  1. 2019 novel coronavirus disease (COVID-19)  Resolved.    - acetaminophen (TYLENOL) 325 MG tablet; Take 2 tablets (650 mg total) by mouth 3 (three) times a day as needed for pain.  Dispense: 300 tablet; Refill: 1    2. Depressed  Stable.    - PARoxetine (PAXIL) 20 MG tablet; TAKE 2 TABLETS(40 MG) BY MOUTH EVERY MORNING  Dispense: 180 tablet; Refill: 2    3. Chronic fatigue  Anemia likely contributing.  Will rx ferrous sulfate.    - HM1(CBC and Differential)  - Thyroid Cascade  - HM1 (CBC with Diff)    4. Shortness of breath  Likely from covid.  Will continue to slowly increase activity.    - albuterol (PROAIR HFA;PROVENTIL HFA;VENTOLIN HFA) 90 mcg/actuation inhaler; Inhale 2 puffs every 6 (six) hours as needed for wheezing.  Dispense: 2  Inhaler; Refill: 3  - XR Chest 2 Views    5. Vaginal itching    - miconazole (MICOTIN) 2 % vaginal cream; Use over vaginal lips nightly  Dispense: 45 g; Refill: 0    6. Screening for cervical cancer    - Gynecologic Cytology (PAP Smear)          Danielle Nair   7/20/2020 1:20 PM

## 2021-06-09 NOTE — PROGRESS NOTES
"  Chief Complaint   Patient presents with     Hemoptysis     x2 weeks     Shortness of Breath     Fever         HPI:   Najma Rodriguez is a 31 y.o. female with  has been sick for two weeks with cough.  Has been productive.  Initially had a few streaks of blood in the sputum.  Seen at [facility] yesterday treated with neb and started on doxycycline.  Has improved somewhat.  Has felt warm and chilled.  Myalgias.  Last dose of antipyretic 6 hours ago.    ROS:  Constitutional: as per hPI  Eyes: negative   ENT: no ear pain, stuffy nose  Respiratory: as per HPi   CV: no chest pain  GI: no vomiting or diarrhea  : negative   SKIN: negative   MS: as per HPi  NEURO: mild headache     Medications:  Current Outpatient Prescriptions on File Prior to Visit   Medication Sig Dispense Refill     acetaminophen (TYLENOL) 325 MG tablet Take 325 mg by mouth every 6 (six) hours as needed for pain.       cholecalciferol, vitamin D3, (VITAMIN D3) 5,000 unit Tab Take 1 tablet (5,000 Units total) by mouth daily. 30 tablet 11     copper (PARAGARD T 380A) 380 square mm IUD IUD 1 each by Intrauterine route once. Lot: [id number]  Exp: Jan 2022  NDC: 60827-047-35       naproxen (NAPROSYN) 500 MG tablet Take 1 tablet (500 mg total) by mouth 2 (two) times a day with meals. 60 tablet 1     PARoxetine (PAXIL) 20 MG tablet TAKE 2 TABLETS BY MOUTH EVERY MORNING 180 tablet 2     prenatal vit-iron fumarate-FA (PRENATAL VITAMIN) 27-0.8 mg Tab Take 1 tablet by mouth daily. 90 tablet 12     No current facility-administered medications on file prior to visit.          Social History:  Social History   Substance Use Topics     Smoking status: Never Smoker     Smokeless tobacco: Never Used     Alcohol use No         Physical Exam:   Vitals:    03/24/17 1518   BP: 96/60   Pulse: 72   Resp: 16   Temp: 98.5  F (36.9  C)   TempSrc: Oral   SpO2: 98%   Weight: 180 lb 12.8 oz (82 kg)   Height: 5' 1\" (1.549 m)       GENERAL:   Alert. Oriented.  EYES: " Clear  HENT:  Ears: R TM pearly gray. Normal landmarks. L TM pearly gray.  Normal landmarks  Nose: Clear.  Sinuses: Nontender.  Oropharynx:  No erythema. No exudate.  NECK: Supple. No adenopathy.  LUNGS: basilar crackles.  Scattered wheezing.  HEART: RRR  SKIN:  No rash.     CHART REVIEW  DATE/place of visit: 3/23/17; [facility]  DX: pneumonia  TESTS: cxr: RLL pneumonia. PEGGY cacified granuloma  TREATMENT: doxycycline, albuterol inhaler     Assessment/Plan:    1. Pneumonia     2. Wheezing  predniSONE (DELTASONE) 20 MG tablet      Just started on antibiotics last night.  Continue antibiotics.  Instructed in how to use inhaler.  Use albuterol 2 puffs tid and prn for 2 weeks then as needed for coughing or wheezing.  Start prednisone.  Recheck if worsening or not improving.  Note given to be off work today and for the days she missed last week 3/13-3/17/17; and 3/24/17      The following portions of the patient's history were reviewed and updated as appropriate: allergies, current medications, past family history, past medical history, past social history, past surgical history and problem list.    Henrique Connolly MD      3/24/2017

## 2021-06-09 NOTE — PROGRESS NOTES
"ASSESMENT AND PLAN:  Diagnoses and all orders for this visit:    Other general counseling and advice for contraceptive management  I had a lengthy discussion with the patient's regarding birth control.  She is not having any significant side effects with current IUD except for heavier menstrual periods.  She is not sexually active currently but still living in the same apartment with her .  Explained the patient that I do not mind removing it but since it was recently placed a year ago she might want to consider keeping it if she become sexually active again in the near  future.  She has 3 children and does not want anymore children.  Patient agreed to defer IUD removal today.  She will make appointment in next few months if she is still wants removal.      Anxiety Disorder Due To General Medical Condition With Panic Attacks    -     PARoxetine (PAXIL) 20 MG tablet; TAKE 2 TABLETS BY MOUTH EVERY MORNING  Dispense: 180 tablet; Refill: 0      Spent 25 min face to face with patient with more the 50% spent in counseling  and discussing problems listed above.        SUBJECTIVE: Najma Rodriguez is a 31-year-old female with history of depression and anxiety here for IUD removal.  States she is no longer sexually active and does not need birth control.  States\" I am not planning on having sexual partner in the future\".  She is in the process of  with her  but they are still living in the same apartment.  She has 3 children ages 10, 12 and 6.  She had IUD placed in July 2016.  No cramping or discomfort with the IUD.  She had history of pelvic pain before IUD placement but now improving with IUD.  She has longer and heavier periods while on IUD.    She takes Paxil 20 mg 2 tablets a day for her anxiety and depression.  Said the medication is working well.  No side effects reported.  She needs refill.  Denies suicidal or homicidal ideations.  Denies visual or auditory hallucinations.      Past Medical " "History:   Diagnosis Date     Anxiety disorder      Borderline personality disorder      Chronic tension type headache      Depression      Functional murmur      Neuralgia      OCD (obsessive compulsive disorder)      Post traumatic stress disorder (PTSD)      Sciatica      Vitamin D deficiency      Patient Active Problem List   Diagnosis     Functional Murmur     Staring Spells (Reported)     Vitamin D Deficiency     Depression     Sciatica     Borderline Personality Disorder     Chronic Tension-type Headache     Anxiety Disorder Due To General Medical Condition With Panic Attacks     Chronic Pain     Lower Back Pain     Neuralgia     Post-traumatic Stress Disorder     Latent Tuberculosis     Obsessive compulsive disorder       Allergies:    Allergies   Allergen Reactions     No Known Drug Allergies        History   Smoking Status     Never Smoker   Smokeless Tobacco     Never Used       Review of systems otherwise negative except as listed in HPI.   History   Smoking Status     Never Smoker   Smokeless Tobacco     Never Used       OBJECTICE: BP 98/74 (Patient Site: Left Arm, Patient Position: Sitting, Cuff Size: Adult Large)  Pulse 74  Temp 98.7  F (37.1  C) (Oral)   Resp 20  Ht 5' 1.5\" (1.562 m)  Wt 184 lb 8 oz (83.7 kg)  LMP 03/01/2017  BMI 34.3 kg/m2          GEN-alert,  in no apparent distress. MOOD is normal.  HEENT- neck is supple.  LUNGS- Clear .  CVS- regular rate and rhythm with no murmur.      Wolf Soliman   4/5/2017     "

## 2021-06-10 NOTE — PROGRESS NOTES
"  Assessment:      Acute conjunctivitis     Plan:      Discussed the diagnosis and proper care of conjunctivitis.  Stressed household hygiene.  School/ note written.  Ophthalmic drops per orders.     Subjective:      Najma Rodriguez is a 31 y.o. female who presents for evaluation of discharge, erythema and itching in the right eye. She has noticed the above symptoms for 1 day. Onset was acute. Patient denies discharge and itching. There is a history of other family members with similar symptoms.  She tried her daughter's polytrim eye drops; no obvious change.  Significant mattering in the morning.  No pain, no change in vision.    The following portions of the patient's history were reviewed and updated as appropriate: allergies, current medications, past family history, past medical history, past social history, past surgical history and problem list.    Review of Systems  A 12 point comprehensive review of systems was negative except as noted .  Joint pain x1 week.  No fevers or rhinorrhea.    Objective:      BP 92/64 (Patient Site: Right Arm, Patient Position: Sitting, Cuff Size: Adult Regular)  Pulse 72  Temp 98  F (36.7  C) (Oral)   Resp 16  Ht 5' 1.5\" (1.562 m)  Wt 185 lb (83.9 kg)  LMP 04/13/2017  BMI 34.39 kg/m2       General: alert, appears stated age and cooperative   Eyes:  positive findings: conjunctiva and sclera: marked injected on the right only.  There is some mild erythema of the right upper eyelid as well.   Vision: Not performed   Fluorescein:  not done        "

## 2021-06-10 NOTE — PROGRESS NOTES
Subjective:   Najma presents today with a professional .  She comes in complaining of a lot of joint pains.  Her fingers and hands hurt.  Her wrists ache.  Sometimes her ankles and feet will ache.  She has had symptoms for 2-3 weeks now.  She feels like the pain is worse as she wakes up in the morning.  It might improve a little as the day goes on.  She does not know of any family history of bad arthritis symptoms.  She is also noticed some blurry vision recently.  She is concerned about this.  She has not seen an eye doctor for this.  She denies any photophobia.  She has had no eye mattering of any kind.  Denies redness in the eyes.  She does not think she has allergies at all.  She denies fever sweats and chills.  She has good energy.  Her appetite is good.      Objective:  HEENT: Conjunctivae are clear.  Pupils equally round and reactive to light.  Fundi are benign.  Lids are not swollen.  Pharynx today is not inflamed at all.  No exudate seen.  Neck: No lymphadenopathy present.  No thyromegaly present.  Lungs: Lungs today are clear bilaterally.  Cardiac: No murmur heard.  There is a regular rhythm present.  Extremities: Both elbows had normal range of motion.  There was palpable tenderness over the lateral epicondyle of the right elbow.  The wrist had mild tenderness over the flexural surfaces of both wrists.  The MCP and PIP joints were slightly inflamed.  There was mild tenderness over the PIP joints of both hands.  She had good range of motion in both hands.  Both heels were tender to palpation.  No crepitus noted on motion of the ankles.  No swelling or edema noted in the ankles.      Assessment:  1.  Arthralgias.  Rule out inflammatory arthritis.  Rule out degenerative arthritis.  2.  Blurry vision.      Plan:  I instructed patient to try to see the eye doctor to reevaluate her eyes.  She agrees to do that.  We discussed possible explanations for her joint problems.  I reviewed inflammatory  arthritis diagnosis along with osteoarthritis.  We have decided to get routine blood work to rule out arthritis.  Patient will be called with any abnormalities.  She will start Naprosyn 500 mg twice daily for pain control.  Follow-up here if new symptoms would arise.

## 2021-06-12 NOTE — PROGRESS NOTES
Subjective:   Najma Rodriguez comes in today with an .  She is complaining of significant pain in general.  She states her chest, back, arms and hands all ache and pain.  Her heels also hurt.  She does not remember any specific injury to these areas.  She has been having problems with pain for many months to even years she states.  She has had blood workup for her joint aches in the past.  Her blood testing was all negative for inflammatory or autoimmune disease.  She denies swelling in many of the joints.  Sometimes the pain gets significant to the point where she is unable to do things in the home such as lifting and cleaning because it hurts so much.      Objective:  Extremities: There is palpable tenderness over the shoulders, elbows and forearms.  Good range of motion noted in all of these joints.  No edema noted in the arms.  Pulses are strong in the wrist.  The lateral hips and knees also were quite tender to palpation.  No effusions present.  No crepitus noted in the hips or knees.  Pulses are strong in the feet.  Back: Back reveals palpable tenderness along the rhomboid and parathoracic muscles.  The sacroiliac areas also were tender.  No erythema noted in any areas.      Assessment:  1.  Myalgias and arthralgias.  Lab work is been negative.  Rule out fibromyalgia.  Rule out depression.      Plan:  I reviewed all the labs with the patient.  I informed her I did not think she had a significant inflammatory arthritis condition.  I encouraged her to exercise.  She will start a stretching program as well.  Due to the severe pain she is having I decided to start gabapentin.  She will start 300 mg daily for 3 days then twice daily for 3 days then 3 times daily thereafter.  Follow-up here in 1 month for recheck.

## 2021-06-14 NOTE — PROGRESS NOTES
"OFFICE VISIT NOTE      Assessment & Plan   Najma Rodriguez is a 35 y.o. female with long-standing PTSD and depression. It sounds like she has many physical symptoms which might be \"just depression\" but I want to further evaluate some. She says it is worse and it's been 2 years since more testing. Her palpitations did not show on the EKG, but we'll do an event monitor.  Given her headaches and vision changes -  She was in the ER and had a head CT and other tests within the past couple months; if new problems develop, will  refer to neurology.    Diagnoses and all orders for this visit:    Palpitations  -     Electrocardiogram Perform and Read  -     SAUL Monitor Hook-Up; Future; Expected date: 01/13/2021    Near syncope  -     Electrocardiogram Perform and Read  -     SAUL Monitor Hook-Up; Future; Expected date: 01/13/2021    Post-traumatic Stress Disorder    Other obsessive-compulsive disorders    Anxiety Disorder Due To General Medical Condition With Panic Attacks    Borderline personality disorder (H)    Chronic tension-type headache, not intractable    Moderate episode of recurrent major depressive disorder (H)        Lindsay Stewart MD              Subjective:   Chief Complaint:  Palpitations and Headache (Blurry visions, tender neck)    35 y.o. female.     1) headache - blurry vision, sometimes double vision  She thinks it's depression because that is what she's been told it is    2) palpitations - not every day  If she's hungry, cold, hot, even sad, then her heart races. Sometimes if she's tired she feels extra twitches  She's had this for a long time.    3) faints being startled  They had a fire alarm at work and she fainted - woke up in the hospital.    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 She is self-concious about this - people don't understand; they think she's crazy; sometimes her memory is bad and she knows it. She just asks a second " "time.    When her  left - she did counseling. Still cries but no longer does counseling.    Current Outpatient Medications   Medication Sig     acetaminophen (TYLENOL) 325 MG tablet Take 2 tablets (650 mg total) by mouth 3 (three) times a day as needed for pain.     ferrous sulfate 325 (65 FE) MG tablet Take 1 tablet (325 mg total) by mouth daily.     albuterol (PROAIR HFA;PROVENTIL HFA;VENTOLIN HFA) 90 mcg/actuation inhaler Inhale 2 puffs every 6 (six) hours as needed for wheezing.     cholecalciferol, vitamin D3, 125 mcg (5,000 unit) capsule TAKE 1 CAPSULE BY MOUTH DAILY     famotidine (FOR PEPCID) 10 MG tablet Take 2 tablets (20 mg total) by mouth 2 (two) times a day.     gabapentin (NEURONTIN) 300 MG capsule TAKE 1 CAPSULE(300 MG) BY MOUTH THREE TIMES DAILY     hydrocortisone 1 % lotion Use over affected area twice daily     ibuprofen (ADVIL,MOTRIN) 400 MG tablet TAKE 1 TABLET(400 MG) BY MOUTH THREE TIMES DAILY AS NEEDED FOR PAIN OR FEVER     loratadine (CLARITIN) 10 mg tablet Take 1 tablet (10 mg total) by mouth daily.     PARoxetine (PAXIL) 20 MG tablet TAKE 2 TABLETS(40 MG) BY MOUTH EVERY MORNING     prazosin (MINIPRESS) 1 MG capsule Take 1 capsule (1 mg total) by mouth at bedtime.       PSFHx: appropriate sections of PMH completed/filled in   Tobacco Status:  She  reports that she has never smoked. She has never used smokeless tobacco.    Review of Systems:  No fever. Neck pain - worse with sadness. All other systems negative except as noted above.    Objective:    /72   Pulse 78   Temp 99  F (37.2  C) (Oral)   Resp 17   Ht 5' 1\" (1.549 m)   Wt 184 lb 8 oz (83.7 kg)   LMP 12/11/2020 (Approximate)   SpO2 97%   BMI 34.86 kg/m    GENERAL: No acute distress.  Mood: sad but sometimes happy  Affect: congruent  Ht: reg s1s2  Lungs: clear    EKG: NSR, normal EKG    2018 she had en EKG, echo and stress test and ACT monitor - all were fine.        "

## 2021-06-15 ENCOUNTER — RECORDS - HEALTHEAST (OUTPATIENT)
Dept: ADMINISTRATIVE | Facility: OTHER | Age: 36
End: 2021-06-15

## 2021-06-15 ENCOUNTER — AMBULATORY - HEALTHEAST (OUTPATIENT)
Dept: NURSING | Facility: CLINIC | Age: 36
End: 2021-06-15

## 2021-06-15 ASSESSMENT — ACTIVITIES OF DAILY LIVING (ADL): DEPENDENT_IADLS:: INDEPENDENT

## 2021-06-15 NOTE — TELEPHONE ENCOUNTER
ED f/u scheduled for:    Mar 22, 2021 12:15 PM   (Arrive by 12:00 PM)   Emergency Room Follow Up with Danielle Nair MD   [facility] ([facility]) [address] [place] [address] 1   [facility] [address]   [phone]     We tried calling [facility] but was transferred around multiple times bc no one dealt with the heart monitor battery. Lorna, can you please reach out to this patient and assist her with getting this addressed? Thanks.

## 2021-06-15 NOTE — TELEPHONE ENCOUNTER
Called and left VM to call clinic so can receive the number to call if she has problems with returning the monitor.  Thanks.

## 2021-06-15 NOTE — TELEPHONE ENCOUNTER
Is she taking her propranolol at all?    She can be scheduled with me.    Please forward to the heart care specialists so they can deal with the dead battery on her heart monitor.    I have written a note for her employer.

## 2021-06-15 NOTE — TELEPHONE ENCOUNTER
"CMT was able to call the  heart clinic, number provided by Lorna Paiz RN.  Called and was given a number to [employer], division of [facility] at [phone] and was told that yes, pt was given a heart monitor to record \"events\" on March 2nd and it was a 3 day monitor.  [facility] did receive data for March 2nd and 3rd that was adequate.  March 4th, was the last day.  Pt will need to mail back the monitor as there is a return package already for her to send it back.  If she is having trouble, pt can call the number above and ask for a  and state the language and they will help her.  Called pt but VM was full.  Will keep trying to call pt. Thanks.   "

## 2021-06-15 NOTE — TELEPHONE ENCOUNTER
Najma calls in to report that she is having multiple panic attacks at work. It takes her 4-5 minutes per attack to settle down and her employer needs a note stating her issues so he can understand what is going on with her. She states she is taking her medication for panic attacks as prescribed but not helping currently. This is longstanding. She also stated that the heart monitor went dead after one day or so when she noted an event.  She would like to see her provider.  Sent to scheduling.     Reason for Disposition    Symptoms interfere with work or school    Started on anti-anxiety medication and no relief    Additional Information    Negative: Symptoms of anxiety or panic and has not been evaluated for this by physician    Protocols used: ANXIETY AND PANIC ATTACK-A-OH

## 2021-06-15 NOTE — TELEPHONE ENCOUNTER
Najma calls in to report that she is having multiple panic attacks at work. It takes her 4-5 minutes per attack to settle down and her employer needs a note stating her issues so he can understand what is going on with her. She states she is taking her medication for panic attacks as prescribed but not helping currently. This is longstanding. She also stated that the heart monitor went dead after one day or so when she noted an event.  She would like to see her provider.  Sent to scheduling.     Pt just received heart monitor.

## 2021-06-15 NOTE — TELEPHONE ENCOUNTER
I am sorry but this pt heart monitor that was placed by [facility] and we cannot access anyone who can assist with getting a new battery for the pt monitor.  I understand pt was not seen at heart care but per her chart her heart monitor was placed or given to her by [facility].  Can we please just have a number as all we get is the run around so we can help this pt obtain a battery for her heart monitor.  Thanks.

## 2021-06-15 NOTE — TELEPHONE ENCOUNTER
Need someone in cardiology to specifically address the heart monitor went dead due to bad battery per PCP.  Pt received from [facility].Thanks

## 2021-06-15 NOTE — TELEPHONE ENCOUNTER
Lorna, please see message below.  We are unable to see who deals with battery's for heart monitors?  It was done at .  Thanks

## 2021-06-15 NOTE — TELEPHONE ENCOUNTER
Called pt but VM is full.  No other phone numbers listed.  If pt calls back, please relay PCP message.  Pt is scheduled for April for panic attacks, but probably need to get in sooner with PCP.  Will send notice of heart monitor to pt heart care specialists.      Thanks

## 2021-06-15 NOTE — PROGRESS NOTES
"OFFICE VISIT NOTE      Assessment & Plan   Najma Rodriguez is a 35 y.o. female with multiple mental diagnoses on her chart such as borderline disorder, anxiety discorder and PTSD. Today, we mainly focused on panic attacks which she gets at work when her boss yells at her to \"get back to work.\" she will try taking propranolol 10mg prn to stop palpitations and hopefully prevent a full blown panic attack. I wrote a note (by hand) saying she will need occasional 5-10min breaks at work for medical reasons. This is just a small intervention to see if it helps her boss to leave her alone. She says that he tells her she has diabetes, but she does not (per labs today).    Monitor results - heart is OK. Not a heart problem.    Do therapy to help with emotions, mental illness. There is a lot that could be done to help her.    Diagnoses and all orders for this visit:    Post-traumatic Stress Disorder  -     HM2(CBC w/o Differential)  -     Comprehensive Metabolic Panel  -     Thyroid Stimulating Hormone (TSH)  -     Urinalysis-UC if Indicated  -     AMB REFERRAL TO MENTAL HEALTH AND ADDICTION  - Adult (18+); Outpatient Treatment; Individual/Couples/Family/Group Therapy/Health Psychology; [facility]; [facility]; We will contact you to schedule the appointment or please c...    Anxiety Disorder Due To General Medical Condition With Panic Attacks  -     HM2(CBC w/o Differential)  -     Comprehensive Metabolic Panel  -     Thyroid Stimulating Hormone (TSH)  -     Urinalysis-UC if Indicated  -     AMB REFERRAL TO MENTAL HEALTH AND ADDICTION  - Adult (18+); Outpatient Treatment; Individual/Couples/Family/Group Therapy/Health Psychology; [facility]; [facility]; We will contact you to schedule the appointment or please c...    Borderline personality disorder (H)  -     HM2(CBC w/o Differential)  -     Comprehensive Metabolic Panel  -     Thyroid Stimulating Hormone (TSH)  -     Urinalysis-UC " if Indicated  -     AMB REFERRAL TO MENTAL HEALTH AND ADDICTION  - Adult (18+); Outpatient Treatment; Individual/Couples/Family/Group Therapy/Health Psychology; [facility]; [facility]; We will contact you to schedule the appointment or please c...    Panic attack  -     HM2(CBC w/o Differential)  -     Comprehensive Metabolic Panel  -     Thyroid Stimulating Hormone (TSH)  -     Urinalysis-UC if Indicated  -     propranoloL (INDERAL) 10 MG tablet; Take 1 tablet (10 mg total) by mouth 3 (three) times a day as needed (for panic attacks).  Dispense: 30 tablet; Refill: 3  -     AMB REFERRAL TO MENTAL HEALTH AND ADDICTION  - Adult (18+); Outpatient Treatment; Individual/Couples/Family/Group Therapy/[facility] Psychology; [facility]; [facility]; We will contact you to schedule the appointment or please c...    Overweight  -     Glycosylated Hemoglobin A1c    Elevated random blood glucose level  -     Glycosylated Hemoglobin A1c    Screening for HIV without presence of risk factors  -     HIV Antigen/Antibody Screening C[facility]    Encounter for hepatitis C screening test for low risk patient  -     Hepatitis C Antibody (Anti-HCV)        Lindsay Stewrat MD    40 minutes spent on the date of the encounter doing chart review, history and exam, documentation and further activities as noted above        Subjective:   Chief Complaint:  Palpitations (has hard time breathing sometimes, like she went up a flight of stairs. Has symptoms at work and boss yells at her over symptoms. a little of rest helps a lot at work.  Onset for about 1 year, it was sporatic before but now it is everyday. Boss at work told her it was diabetes, would like to have bloodwork. )    35 y.o. female.     1) appointment made same day for med check, but upon arrival patient had many other issues:  -at work her heart started racing, then trouble breathing, she rested but her boss got mad at her for resting. The heart  "racing problem has been going on for 1-2 months and happens a few times per week.    Vision changes - sometimes becomes dim, sometimes she feels like she will pass out. Once the feeling \"goes up\" then it gets better. Sometimes these vision changes happen even when she's not panicking.    Heart monitor - she got it but after wearing it one day the battery ran out.    2) she wonders if she has diabetes? Her boss accused her of this. When she is hungry she gets sweaty and she feels tired.    Current Outpatient Medications   Medication Sig     acetaminophen (TYLENOL) 325 MG tablet Take 2 tablets (650 mg total) by mouth 3 (three) times a day as needed for pain.     albuterol (PROAIR HFA;PROVENTIL HFA;VENTOLIN HFA) 90 mcg/actuation inhaler Inhale 2 puffs every 6 (six) hours as needed for wheezing.     cholecalciferol, vitamin D3, 125 mcg (5,000 unit) capsule TAKE 1 CAPSULE BY MOUTH DAILY     famotidine (FOR PEPCID) 10 MG tablet Take 2 tablets (20 mg total) by mouth 2 (two) times a day.     ferrous sulfate 325 (65 FE) MG tablet Take 1 tablet (325 mg total) by mouth daily.     gabapentin (NEURONTIN) 300 MG capsule TAKE 1 CAPSULE(300 MG) BY MOUTH THREE TIMES DAILY     hydrocortisone 1 % lotion Use over affected area twice daily     ibuprofen (ADVIL,MOTRIN) 400 MG tablet TAKE 1 TABLET(400 MG) BY MOUTH THREE TIMES DAILY AS NEEDED FOR PAIN OR FEVER     loratadine (CLARITIN) 10 mg tablet Take 1 tablet (10 mg total) by mouth daily.     PARoxetine (PAXIL) 20 MG tablet TAKE 2 TABLETS(40 MG) BY MOUTH EVERY MORNING     prazosin (MINIPRESS) 1 MG capsule Take 1 capsule (1 mg total) by mouth at bedtime.     propranoloL (INDERAL) 10 MG tablet Take 1 tablet (10 mg total) by mouth 3 (three) times a day as needed (for panic attacks).       PSFHx: appropriate sections of PMH completed/filled in   Tobacco Status:  She  reports that she has never smoked. She has never used smokeless tobacco.    Review of Systems:  No fever.  No rash. All other " "systems negative except as noted above.    Objective:    /60   Pulse 84   Temp 98.3  F (36.8  C) (Oral)   Resp 18   Ht 5' 1\" (1.549 m)   Wt 185 lb 4 oz (84 kg)   LMP  (LMP Unknown)   SpO2 96%   BMI 35.00 kg/m    GENERAL: No acute distress, initially was fine, as she reported what goes on at work she cried, then her voice became loud and then she resumed being weepy    Mood: variable, mostly sad  Affect: congruent    Reviewed heart monitor results with her  "

## 2021-06-15 NOTE — PROGRESS NOTES
Subjective:   Najma Alexandra comes in today complaining of headaches.  She gets daily headaches.  She sometimes will awaken in the morning with headaches.  They typically start in the posterior part of the head.  Sometimes they are one-sided.  Sometimes they are over the entire scalp.  The temporal areas also will ache.  She feels like she gets dizzy as well with these.  She denies any double vision.  She denies any arm or leg weakness or numbness associated with the headaches.  There has been no head trauma in the past.  She also been having a lot of foot pain.  Both heels ache.  Pain worsens with activity and walking.  She denies any swelling in her feet.  She has not had any recent injuries.  Her foot pain is been going on for almost a year.      Objective:  HEENT: Conjunctivae clear.  Both TMs are gray.  Sinuses nontender today.  Pharynx today reveals no erythema.  Neck: There is palpable tenderness noted in the occipital areas bilaterally.  Both cervical musculatures are tender to palpation with minimal spasm.  No crepitus noted.  No bruits heard.  No thyromegaly present.  Musculoskeletal: The low back has no significant pain.  Lower extremity's have no edema.  Both heels have palpable tenderness over the plantar surface.  Achilles tendon is nontender.  The arches of the feet are high and slightly tender.  Neurologic: Cranial nerves all are intact.  Speech is clear.  Patient answers questions appropriately.  Affect seemed normal in the clinic today.      Assessment:  1.  Headaches.  These appear to be most consistent with muscle tension type headaches.  These are chronic.  2.  Heel pain bilaterally.  Consistent with plantar fasciitis.      Plan:  The patient will be referred to podiatry to see if they think orthotics would be indicated and improved patient's pain since she has had this for almost a year.  We will start diclofenac sodium 75 mg twice daily for a couple weeks to see if this helps troll the headache  pattern.  I gave her stretching exercises and strengthening exercises for her neck to help prevent headaches in the future.  She may benefit from physical therapy or chiropractic therapy if pains persist.  Follow-up here if any symptoms worsen.

## 2021-06-16 PROBLEM — J98.4 CALCIFIED GRANULOMA OF LUNG: Status: ACTIVE | Noted: 2018-01-29

## 2021-06-16 NOTE — TELEPHONE ENCOUNTER
Letter sent to pt via [employer] to relay how to send monitor back and if pt is taking her propanolol.  Thanks

## 2021-06-16 NOTE — PROGRESS NOTES
Clinic Care Coordination Contact  [facility]/Voicemail    Clinical Data: Care Coordinator Outreach  Outreach attempted x 1. CHW called, unable to reach and unable to leave message on patient's phone due to voice mail is full. Plan: will try to reach patient again in 1-2 business days.

## 2021-06-16 NOTE — PROGRESS NOTES
Clinic Care Coordination Contact  Program: [facility]   County:  [facility] Case #:  [facility] Worker:   Date Applied:   Renewal:       Outreach:   4/12/2021: Patient was un-enrolled from [facility]. No further needs from FRW.   4/6/2021: FRW attempted to call patient, VM box full. FRW will call again within one week. Attempt x 1.           Referral/Screening:   [facility] Benefits  Is patient requesting help applying for [facility] benefits?: Yes  Have you recently applied for any [facility] benefits?: No  How many people in your household?: 4  Do you buy/eat food together?: Yes  What is the monthly gross income for the household (wages, social security, workers comp, and pension)? : 1560  Is patient requesting an increase in SNAP/CASH?: No     Insurance: NOT Applying per FRW   [place]?: Yes  Is this an insurance renewal?: No  Is this a new insurance application request?: Yes  Have you recently applied for insurance?: No  How many people in your household? : 4  Do you file taxes?: Yes  How many dependents do you claim?: 3  What is the monthly gross income for the household (wages, social security, workers comp, and pension)? : 1560     Care Coordination team will tell patient:   Thank you for answering all the questions, based on screening questions, our Financial Resource Worker will reach out to you with additional questions and next steps.     Patient is requesting to call early in the morning at 8:00 AM due to work schedule.      Goals Addressed:

## 2021-06-16 NOTE — PROGRESS NOTES
Clinic Care Coordination Contact  Community Health Worker Initial Outreach    CHW Initial Information Gathering:  Referral Source: PCP  Preferred Hospital: [facility]  [phone]  Preferred Urgent Care: [facility], [phone]  Current living arrangement:: I live in a private home with family  Type of residence:: Private home - stairs  Community Resources: None  Supplies Currently Used at Home: None  Equipment Currently Used at Home: none  Informal Support system:: Family  No PCP office visit in Past Year: No  Transportation means:: Regular car  CHW Additional Questions  MyChart active?: No  Patient agreeable to assistance with activating MyChart?: No    Patient accepts CC: Yes. Patient scheduled for assessment with CCC SW on Thursday 3/25 at 3pm. Patient noted desire to discuss other possible needs or concerns besides SNAP application. She has reported mental health concerns and may benefit from additional resources.       FRW Screening Completed     - Report new income to [employer]   - Apply for SNAP benefits    Clinic Care Coordination Contact  Financial Resource Worker Screening    County Benefits  Is patient requesting help applying for [facility] benefits?: Yes  Have you recently applied for any county benefits?: No  How many people in your household?: 4(1 adult, 3 children)  Do you buy/eat food together?: Yes  What is the monthly gross income for the household (wages, social security, workers comp, and pension)? : 1690($13/hr 30hrs per week; $28,280 annually)  Is patient requesting an increase in SNAP/CASH?: No    Insurance:  [place]?: No  Is this an insurance renewal?: No  Is this a new insurance application request?: No    Any other information for the FRW?: Starting new job March 29th and wants to apply for SNAP benefits. Needs to report new income to [employer] as well. Prefers afternoon calls.

## 2021-06-16 NOTE — PROGRESS NOTES
Clinic Care Coordination Contact     Pt was a no show for initial CCC phone assessment x1. Rescheduled with CCC SW for 3/26/21 at 1pm.

## 2021-06-16 NOTE — TELEPHONE ENCOUNTER
Refill Approved    Rx renewed per Medication Renewal Policy. Medication was last renewed on 7/18/20.    Stephen Olsen, Care Connection Triage/Med Refill 4/14/2021     Requested Prescriptions   Pending Prescriptions Disp Refills     gabapentin (NEURONTIN) 300 MG capsule [Pharmacy Med Name: GABAPENTIN 300MG CAPSULES] 270 capsule 2     Sig: TAKE 1 CAPSULE(300 MG) BY MOUTH THREE TIMES DAILY       Gabapentin/Levetiracetam/Tiagabine Refill Protocol  Passed - 4/13/2021  7:21 AM        Passed - PCP or prescribing provider visit in past 12 months or next 3 months     Last office visit with prescriber/PCP: 3/22/2021 Danielle Nair MD OR same dept: 3/22/2021 Danielle Nair MD OR same specialty: 3/22/2021 Danielle Nair MD  Last physical: 7/20/2020 Last MTM visit: Visit date not found   Next visit within 3 mo: Visit date not found  Next physical within 3 mo: Visit date not found  Prescriber OR PCP: Danielle Nair MD  Last diagnosis associated with med order: 1. Chronic Pain  - gabapentin (NEURONTIN) 300 MG capsule [Pharmacy Med Name: GABAPENTIN 300MG CAPSULES]; TAKE 1 CAPSULE(300 MG) BY MOUTH THREE TIMES DAILY  Dispense: 270 capsule; Refill: 2    If protocol passes may refill for 12 months if within 3 months of last provider visit (or a total of 15 months).

## 2021-06-16 NOTE — PROGRESS NOTES
Clinic Care Coordination Contact  Community Health Worker Initial Outreach    CHW Initial Information Gathering:  Referral Source: PCP  Preferred Hospital: [facility]  [phone]  Preferred Urgent Care: [facility], [phone]  Current living arrangement:: I live in a private home with family  Type of residence:: Apartment  Community Resources: None  Supplies Currently Used at Home: None  Equipment Currently Used at Home: none  Informal Support system:: Family, Friends  No PCP office visit in Past Year: No  Transportation means:: Accessible car, Friend, Medical transport  CHW Additional Questions  If ED/Hospital discharge, follow-up appointment scheduled as recommended?: N/A  Medication changes made following ED/Hospital discharge?: N/A  MyChart active?: No  Patient agreeable to assistance with activating MyChart?: No    Patient accepts CC: Yes. Patient scheduled for assessment with CCC PATY on 04/08/2021 at 8:30 AM. Patient noted desire to discuss [facility] benefits.       Financial Resource Worker Screening    [facility] Benefits  Is patient requesting help applying for [facility] benefits?: Yes  Have you recently applied for any [facility] benefits?: No  How many people in your household?: 4  Do you buy/eat food together?: Yes  What is the monthly gross income for the household (wages, social security, workers comp, and pension)? : 1560  Is patient requesting an increase in SNAP/CASH?: No    [place]:  [place] Yes  Is this an insurance renewal?: No  Is this a new insurance application request?: Yes  Have you recently applied for insurance?: No  How many people in your household? : 4  Do you file taxes?: Yes  How many dependents do you claim?: 3  What is the monthly gross income for the household (wages, social security, workers comp, and pension)? : 1560         Care Coordination team will tell patient:   Thank you for answering all the questions, based  on screening questions, our Financial Resource Worker will reach out to you with additional questions and next steps.    Patient is requesting to call early in the morning at 8:00 AM due to work schedule.

## 2021-06-16 NOTE — TELEPHONE ENCOUNTER
Refill Approved    Rx renewed per Medication Renewal Policy. Medication was last renewed on 7/20/20.    Stephen Olsen, Care Connection Triage/Med Refill 4/15/2021     Requested Prescriptions   Pending Prescriptions Disp Refills     PARoxetine (PAXIL) 20 MG tablet [Pharmacy Med Name: PAROXETINE 20MG TABLETS] 180 tablet 2     Sig: TAKE 2 TABLETS(40 MG) BY MOUTH EVERY MORNING       [facility] Refill Protocol  Passed - 4/14/2021 12:04 PM        Passed - PCP or prescribing provider visit in last year     Last office visit with prescriber/PCP: 3/22/2021 Danielle Nair MD OR same dept: 3/22/2021 Danielle Nair MD OR same specialty: 3/22/2021 Danielle Nair MD  Last physical: 7/20/2020 Last MTM visit: Visit date not found   Next visit within 3 mo: Visit date not found  Next physical within 3 mo: Visit date not found  Prescriber OR PCP: Danielle Nair MD  Last diagnosis associated with med order: 1. Depressed  - PARoxetine (PAXIL) 20 MG tablet [Pharmacy Med Name: PAROXETINE 20MG TABLETS]; TAKE 2 TABLETS(40 MG) BY MOUTH EVERY MORNING  Dispense: 180 tablet; Refill: 2    If protocol passes may refill for 12 months if within 3 months of last provider visit (or a total of 15 months).

## 2021-06-16 NOTE — PROGRESS NOTES
Clinic Care Coordination Contact  Program: [facility]NAP[facility]CASH, [facility]   County:  [facility] Case #:  [facility] Worker:   Claribel #:   Subscriber ID #:   Date Applied:   Renewal:    *prefers afternoon calls*    Outreach:   3/29/21: Outreach attempted x 2. Left message on voicemail indicating last outreach attempt.   Plan: FRW will send an unreachable letter and remove from panel. Patient can be referred back to FRW.     3/24/21: Outreach attempted x 1. Left message on voicemail with call back information and requested return call.  Plan: FRW will call again within one week.       Health Insurance:      Referral/Screening:   Clinic Care Coordination Contact  Financial Resource Worker Screening     [facility] Benefits  Is patient requesting help applying for [facility] benefits?: Yes  Have you recently applied for any [facility] benefits?: No  How many people in your household?: 4(1 adult, 3 children)  Do you buy/eat food together?: Yes  What is the monthly gross income for the household (wages, social security, workers comp, and pension)? : 1690($13/hr 30hrs per week; $28,280 annually)  Is patient requesting an increase in SNAP/CASH?: No     Ins[place]:  [place]?: No  Is this an insurance renewal?: No  Is this a new insurance application request?: No     Any other information for the FRW?: Starting new job March 29th and wants to apply for SNAP benefits. Needs to report new income to [facility] as well. Prefers afternoon calls.            Goals Addressed:

## 2021-06-16 NOTE — PROGRESS NOTES
Clinic Care Coordination Contact     Pt was a no show for initial CCC phone assessment x2 CHW to re-attempt outreach and offer to reschedule if needed. Of note, pt was also unreachable by FRW on 3/24/21 and 3/29/21.     Previous no shows: 3/25/21
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
## 2021-06-16 NOTE — PROGRESS NOTES
Clinic Care Coordination Contact  Program: SNAP/CASH, reporting income to [employer]   County:  [facility] Case #:  [facility] Worker:   Claribel #:   Subscriber ID #:   Date Applied:   Renewal:    *prefers afternoon calls*    Outreach:   3/24/21: Outreach attempted x 1. Left message on voicemail with call back information and requested return call.  Plan: FRW will call again within one week.       Health Insurance:      Referral/Screening:   Clinic Care Coordination Contact  Financial Resource Worker Screening     [facility] Benefits  Is patient requesting help applying for [facility] benefits?: Yes  Have you recently applied for any [facility] benefits?: No  How many people in your household?: 4(1 adult, 3 children)  Do you buy/eat food together?: Yes  What is the monthly gross income for the household (wages, social security, workers comp, and pension)? : 1690($13/hr 30hrs per week; $28,280 annually)  Is patient requesting an increase in SNAP/CASH?: No     [place]:  [place] No  Is this an insurance renewal?: No  Is this a new insurance application request?: No     Any other information for the FRW?: Starting new job March 29th and wants to apply for SNAP benefits. Needs to report new income to [facility] as well. Prefers afternoon calls.            Goals Addressed:

## 2021-06-16 NOTE — TELEPHONE ENCOUNTER
RN cannot approve Refill Request    RN can NOT refill this medication med is not covered by policy/route to provider. Last office visit: 3/22/2021 Danielle Nair MD Last Physical: 7/20/2020 Last MTM visit: Visit date not found Last visit same specialty: 3/22/2021 Danielle Nair MD.  Next visit within 3 mo: Visit date not found  Next physical within 3 mo: Visit date not found      Francisca Ramsey, Care Connection Triage/Med Refill 4/20/2021    Requested Prescriptions   Pending Prescriptions Disp Refills     acetaminophen (TYLENOL) 325 MG tablet [Pharmacy Med Name: ACETAMINOPHEN 325MG TABLETS] 300 tablet 1     Sig: TAKE 2 TABLETS(650 MG) BY MOUTH THREE TIMES DAILY AS NEEDED FOR PAIN       There is no refill protocol information for this order

## 2021-06-16 NOTE — PROGRESS NOTES
Clinic Care Coordination Contact     Pt was a no show for initial CCC phone assessment x3. No further outreach will be attempted at this time. PCP or other Care Team member may re-refer in the future if needs/concerns persist.    Previous no shows: 3/24/21 (w/ FRW), 3/25/21, 3/26/21, 3/29/21 (w/ FRW), 4/6/21 (w/ FRW)  
153

## 2021-06-17 NOTE — TELEPHONE ENCOUNTER
Pt received message to return call.     line agent [phone] assisted.    Pt needs help with benefits     Verified the best number to reach patient is .  The best time to reach patient is after 4:30 PM due to work schedule.    Provided reminder of next appt date and time.      Pt is requesting refill of Iron, Vitamin D and all medications.  Could not provide names just needs all medications - very important per patient.

## 2021-06-17 NOTE — TELEPHONE ENCOUNTER
RN cannot approve Refill Request    RN can NOT refill this medication med is not covered by policy/route to provider. Last office visit: 3/22/2021 Danielle Nair MD Last Physical: 7/20/2020 Last MTM visit: Visit date not found Last visit same specialty: 3/22/2021 Danielle Nair MD.  Next visit within 3 mo: Visit date not found  Next physical within 3 mo: Visit date not found      Nely Torres, [facility] Triage/Med Refill 5/13/2021    Requested Prescriptions   Pending Prescriptions Disp Refills     cholecalciferol, vitamin D3, 125 mcg (5,000 unit) capsule [Pharmacy Med Name: VITAMIN D3 5000 UNIT CAPSULES] 90 capsule 3     Sig: TAKE 1 CAPSULE BY MOUTH DAILY       There is no refill protocol information for this order           
yes

## 2021-06-17 NOTE — TELEPHONE ENCOUNTER
Can  please call pt about seeing SW for benefit.  Due to when call needed to occur, CMT unable to call pt. Thanks.

## 2021-06-17 NOTE — TELEPHONE ENCOUNTER
Called and spoke with patient who stated that she works from 9:30 AM to 4:00 PM and would like to meet with SW to complete form due to work hours reduced and would like to apply for [place] benefits. Will you able to meet with patient in person any day?

## 2021-06-17 NOTE — TELEPHONE ENCOUNTER
Reason for Call: Request for an order or referral: Order    Order or referral being requested: Care Management    Date needed: at your convenience    Has the patient been seen by the PCP for this problem? YES    Additional comments: Pt wants SW and FRG to reach out to pt again. Her work genaro changed so was unable to get to phone when CCC called. Per Anusha's encounter:    Pt was a no show for initial CCC phone assessment x3. No further outreach will be attempted at this time. PCP or other Care Team member may re-refer in the future if needs/concerns persist.     Previous no shows: 3/24/21 (w/ FRW), 3/25/21, 3/26/21, 3/29/21 (w/ FRW), 4/6/21 (w/ FRW)    Please re-refer.    Phone number Patient can be reached at:  Cell number on file:    Telephone Information:   Mobile [phone]       Best Time:  4:15pm or after, pt works 9a-4p Mon-Fri.    Can we leave a detailed message on this number?  Yes    Call taken on 4/30/2021 at 8:43 AM by Ricardo Porter

## 2021-06-18 NOTE — PATIENT INSTRUCTIONS - HE
Patient Instructions by Jovita Charles CNP at 2/3/2020  9:00 AM     Author: Jovita Charles CNP Service: -- Author Type: Nurse Practitioner    Filed: 2/3/2020 11:18 AM Encounter Date: 2/3/2020 Status: Signed    : Jovita Charles CNP (Nurse Practitioner)         Patient Education     Viral Gastroenteritis (Adult)    Gastroenteritis is commonly called the stomach flu. It is most often caused by a virus that affects the stomach and intestinal tract and usually lasts from 2 to 7 days. Common viruses causing gastroenteritis include norovirus, rotavirus, and hepatitis A. Non-viral causes of gastroenteritis include bacteria, parasites, and toxins.  The danger from repeated vomiting or diarrhea is dehydration. This is the loss of too much fluid from the body. When this occurs, body fluids must be replaced. Antibiotics do not help with this illness because it is usually viral.Simple home treatment will be helpful.  Symptoms of viral gastroenteritis may include:    Watery, loose stools    Stomach pain or abdominal cramps    Fever and chills    Nausea and vomiting    Loss of bowel control    Headache  Home care  Gastroenteritis is transmitted by contact with the stool or vomit of an infected person. This can occur from person to person or from contact with a contaminated surface.  Follow these guidelines when caring for yourself at home:    If symptoms are severe, rest at home for the next 24 hours or until you are feeling better.    Wash your hands with soap and water or use alcohol-based  to prevent the spread of infection. Wash your hands after touching anyone who is sick.    Wash your hands or use alcohol-based  after using the toilet and before meals. Clean the toilet after each use.  Remember these tips when preparing food:    People with diarrhea should not prepare or serve food to others. When preparing foods, wash your hands before and after.    Wash your hands after using cutting  boards, countertops, knives, or utensils that have been in contact with raw food.    Keep uncooked meats away from cooked and ready-to-eat foods.  Medicine  You may use acetaminophen or NSAID medicines like ibuprofen or naproxen to control fever unless another medicine was given. If you have chronic liver or kidney disease, talk with your healthcare provider before using these medicines. Also talk with your provider if you've had a stomach ulcer or gastrointestinal bleeding. Don't give aspirin to anyone under 18 years of age who is ill with a fever. It may cause severe liver damage. Don't use NSAIDS is you are already taking one for another condition (like arthritis) or are on aspirin (such as for heart disease or after a stroke).  If medicine for vomiting or diarrhea are prescribed, take these only as directed. Do not take over-the-counter medicines for vomiting or diarrhea unless instructed by your healthcare provider.  Diet  Follow these guidelines for food:    Water and liquids are important so you don't get dehydrated. Drink a small amount at a time or suck on ice chips if you are vomiting.    If you eat, avoid fatty, greasy, spicy, or fried foods.    Don't eat dairy if you have diarrhea. This can make diarrhea worse.    Avoid tobacco, alcohol, and caffeine which may worsen symptoms.  During the first 24 hours (the first full day), follow the diet below:    Beverages. Sports drinks, soft drinks without caffeine, ginger ale, mineral water (plain or flavored), decaffeinated tea and coffee. If you are very dehydrated, sports drinks aren't a good choice. They have too much sugar and not enough electrolytes. In this case, commercially available products called oral rehydration solutions, are best.    Soups. Eat clear broth, consommé, and bouillon.    Desserts. Eat gelatin, popsicles, and fruit juice bars.  During the next 24 hours (the second day), you may add the following to the above:    Hot cereal, plain toast,  bread, rolls, and crackers    Plain noodles, rice, mashed potatoes, chicken noodle or rice soup    Unsweetened canned fruit (avoid pineapple), bananas    Limit fat intake to less than 15 grams per day. Do this by avoiding margarine, butter, oils, mayonnaise, sauces, gravies, fried foods, peanut butter, meat, poultry, and fish.    Limit fiber and avoid raw or cooked vegetables, fresh fruits (except bananas), and bran cereals.    Limit caffeine and chocolate. Don't use spices or seasonings other than salt.    Limit dairy products.    Avoid alcohol.  During the next 24 hours:    Gradually resume a normal diet as you feel better and your symptoms improve.    If at any time it starts getting worse again, go back to clear liquids until you feel better.  Follow-up care  Follow up with your healthcare provider, or as advised. Call your provider if you don't get better within 24 hours or if diarrhea lasts more than a week. Also follow up if you are unable to keep down liquids and get dehydrated. If a stool (diarrhea) sample was taken, call as directed for the results.  Call 911  Call 911 if any of these occur:    Trouble breathing    Chest pain    Confused    Severe drowsiness or trouble awakening    Fainting or loss of consciousness    Rapid heart rate    Seizure    Stiff neck  When to seek medical advice  Call your healthcare provider right away if any of these occur:    Abdominal pain that gets worse    Continued vomiting (unable to keep liquids down)    Frequent diarrhea (more than 5 times a day)    Blood in vomit or stool (black or red color)    Dark urine, reduced urine output, or extreme thirst    Weakness or dizziness    Drowsiness    Fever of 100.4 F (38 C) or higher, or as directed by your healthcare provider    New rash  Date Last Reviewed: 1/3/2016    0969-0238 [employer]. [address] [place] [address], [place] [address]. All rights reserved. This information is not intended as a substitute for  professional medical care. Always follow your healthcare professional's instructions.

## 2021-06-18 NOTE — PROGRESS NOTES
"S:  32-year-old female who comes in today because she would like her IUD removed.  She says that she is no longer sexually active.  She and her partner have been  for 1 year now.  She does not desire another pregnancy but does not wish to continue using an IUD.  She says that the IUD causes her to have low back pain prior to her period.  She also says that it causes rectal pain.  She denies any other problems with it at this time.    2.  Anxiety disorder and depression.  This is been worsened since her breakup.  She denies any suicidal or homicidal ideation.  She is able to sleep most nights.  She tries to exercise.  She continues with therapy.  She still is taking all of her medications and denies any problems with these.    3.  Diarrhea.  She says that she will have some rectal pain and need to go use the bathroom often.  This worsens when she is walking.  When she has to go to the bathroom she feels some pain in her rectum.  If she does not empty her bowels the pain gets worse.  She denies any blood in her stools.  She does feel sense of urgency though she denies any accidents.  She denies any bladder problems.    4.  Itching.  She says that she borrowed 1 of her friends clothing who had some itching over her body and now she has itching over her front and her back.  She has been scratching at it.  She denies any itching anywhere else on her body.  She denies any new soaps, lotions, creams.      Social history: her former  is now with a new partner.  She does say that there is concern that he was with another partner while they were still together.  She would like STD testing today.  She does say she has had challenges that she does not know how to drive and does not have access to a car.  This makes it difficult for her to get places.      O:  /82  Pulse 70  Temp 98.1  F (36.7  C) (Oral)   Resp 16  Ht 5' 1.5\" (1.562 m)  Wt 190 lb (86.2 kg)  LMP 06/04/2018 (Exact Date)  SpO2 99%  BMI " 35.32 kg/m2  Gen:  Nad, alert  Psych: No suicidal or homicidal ideation.  Fluent speech.  Normal affect.  Good mood.  Genitourinary Exam:   Vulva: normal skin.  No lesions noted.  Nontender.    Urethral meatus: normal size and location, no lesions or discharge   Urethra: no tenderness or masses   Bladder: no fullness or tenderness   Vagina: normal appearance, physiologic discharge. No evidence of cystocele or rectocele.   Cervix: normal appearance, no lesions, no cervical motion tenderness .  IUD strings visible at the cervical office.  These were gently grasped with a ring forceps and the IUD was removed without difficulty.  There were no complications.  Patient tolerated the procedure well.  The IUD was discarded.  Uterus: normal size and position, mobile, non-tender   Pap smear obtained: no.    Adnexa: no palpable masses bilaterally   Rectal exam: Sphincter tone is normal.  External rectum is normal.  No fissures are noted.  Small hemorrhoids are noted.  Skin: Small excoriated area between her bilateral breasts.  No other rashes are noted.        Patient Active Problem List   Diagnosis     Functional Murmur     Staring Spells (Reported)     Vitamin D deficiency     Depression     Sciatica     Borderline personality disorder     Chronic Tension-type Headache     Anxiety Disorder Due To General Medical Condition With Panic Attacks     Chronic Pain     Lower Back Pain     Neuralgia     Post-traumatic Stress Disorder     Latent Tuberculosis     Obsessive compulsive disorder     Current Outpatient Prescriptions on File Prior to Visit   Medication Sig Dispense Refill     cholecalciferol, vitamin D3, (VITAMIN D3) 5,000 unit Tab Take 1 tablet (5,000 Units total) by mouth daily. 30 tablet 11     gabapentin (NEURONTIN) 300 MG capsule Take 1 capsule (300 mg total) by mouth 3 (three) times a day. 90 capsule 5     PARoxetine (PAXIL) 20 MG tablet Take 2 tablets (40 mg total) by mouth every morning. 180 tablet 2      [DISCONTINUED] copper (PARAGARD T 380A) 380 square mm IUD IUD 1 each by Intrauterine route once. Lot: [id number]  Exp: Jan 2022  NDC: 35801-287-37       acetaminophen (TYLENOL) 325 MG tablet Take 325 mg by mouth every 6 (six) hours as needed for pain.       albuterol (PROVENTIL HFA;VENTOLIN HFA) 90 mcg/actuation inhaler Inhale 2 puffs every 6 (six) hours as needed for wheezing.       cholecalciferol, vitamin D3, 5,000 unit capsule TAKE 1 CAPSULE BY MOUTH DAILY 90 capsule 3     diclofenac (VOLTAREN) 75 MG EC tablet Take 1 tablet (75 mg total) by mouth 2 (two) times a day. 60 tablet 1     prenatal vit-iron fumarate-FA (PRENATAL VITAMIN) 27-0.8 mg Tab Take 1 tablet by mouth daily. 90 tablet 12     tobramycin (TOBREX) 0.3 % ophthalmic solution Apply 1 drop to eye every 4 (four) hours. 5 mL 1     No current facility-administered medications on file prior to visit.           No results found for this or any previous visit (from the past 48 hour(s)).      Assessment/Plan:  1. Screening for STD (sexually transmitted disease)  I will contact her with any abnormal results    - Chlamydia trachomatis & Neisseria gonorrhoeae, Amplified Detection  - Wet Prep, Vaginal    2. Encounter for IUD removal  Status post IUD removal.  I did let her know that she is now no longer protected against pregnancy.  If she decides to be sexually active again she should let us know so we can help her with birth control or she should use condoms.    3. Itching  Stopped using soap over her trunk.  Use hydrocortisone cream twice daily for 2 weeks.  Return if no improvement.  - hydrocortisone 1 % lotion; Use over affected area twice daily  Dispense: 118 mL; Refill: 0      The entire conversation today was conducted through the use of a professional .    Return if removal of iud does not improve sx of rectal pain, or occasional diarrhea.    Encouraged exercise.    The following high BMI interventions were performed this visit: encouragement to  exercise and prescribed dietary intake      Danielle Nair   6/4/2018 4:16 PM

## 2021-06-19 NOTE — LETTER
Letter by Marychuy Lombardi MD at      Author: Marychuy Lombardi MD Service: -- Author Type: --    Filed:  Encounter Date: 5/22/2019 Status: (Other)        [facility] PATIENT ACCESS  [address]  [facility] [address]  [phone]         Najma Rodriguez  [address] [place] [address]        05/22/19    Dear Najma Rodriguez     At [facility] we care about your health and well-being. Your primary care provider is committed to ensuring you receive high quality care and has chosen a network of specialists to assist in providing that care. Recently Dr. Lombardi referred you to Psychology for specialty care.      It is important to your overall health to follow through with the recommendation from your provider. Please call [facility] at [phone] at your earliest convenience for assistance in scheduling an appointment.  If you have already scheduled this appointment, please disregard this notice.  Thank you for choosing [facility] for your healthcare needs.       Sincerely,       [facility] Specialty Scheduling

## 2021-06-19 NOTE — LETTER
Letter by Danielle Nair MD at      Author: Danielle Nair MD Service: -- Author Type: --    Filed:  Encounter Date: 9/16/2019 Status: Signed       October 7, 2019    Dear Najma Rodriguez:    On review of your records, we have found a gap in your health care services.  Based on your age and health history, we recommend the following service/s:  - Annual physical exam    - Immunization/s  - Depression check    Please call us at [phone] to make an appointment for the above service/s.  If you had had the service done elsewhere, please let us know so that we can update our records.  If you have transferred care to another clinic, please let us know so that we can remove you from our list of current patients.   We believe that a strong preventive health program that includes regular visits and follow up care is an important part of your health and are committed to assisting you in being as healthy as you can be.     Sincerely,     [facility]

## 2021-06-19 NOTE — PROGRESS NOTES
"HPI -33 yo female where with fever, sore throat, bodyache and headache with eye pain for 3 days.   Sx worsening  No vomiting or diarrhea, ear pain, dysuria, cough, shortness of breath  Sick contacts - none  Tried - tylenol and ibuprofen      Current Outpatient Prescriptions   Medication Sig     acetaminophen (TYLENOL) 325 MG tablet Take 325 mg by mouth every 6 (six) hours as needed for pain.     albuterol (PROVENTIL HFA;VENTOLIN HFA) 90 mcg/actuation inhaler Inhale 2 puffs every 6 (six) hours as needed for wheezing.     cholecalciferol, vitamin D3, (VITAMIN D3) 5,000 unit Tab Take 1 tablet (5,000 Units total) by mouth daily.     cholecalciferol, vitamin D3, 5,000 unit capsule TAKE 1 CAPSULE BY MOUTH DAILY     diclofenac (VOLTAREN) 75 MG EC tablet Take 1 tablet (75 mg total) by mouth 2 (two) times a day.     gabapentin (NEURONTIN) 300 MG capsule Take 1 capsule (300 mg total) by mouth 3 (three) times a day.     hydrocortisone 1 % lotion Use over affected area twice daily     PARoxetine (PAXIL) 20 MG tablet Take 2 tablets (40 mg total) by mouth every morning.     prenatal vit-iron fumarate-FA (PRENATAL VITAMIN) 27-0.8 mg Tab Take 1 tablet by mouth daily.     tobramycin (TOBREX) 0.3 % ophthalmic solution Apply 1 drop to eye every 4 (four) hours.     Vitals:    07/17/18 0918   BP: 112/58   Patient Site: Left Arm   Patient Position: Sitting   Cuff Size: Adult Large   Pulse: 90   Temp: 98.9  F (37.2  C)   TempSrc: Oral   SpO2: 98%   Weight: 196 lb (88.9 kg)   Height: 5' 1.5\" (1.562 m)     OBJECTIVE:  Vitals listed above within normal limits  General appearance: well groomed, pleasant, well hydrated, nontoxic appearing  ENT: PERRL, throat clear  Neck: neck supple, no lymphadenopathy, no thyromegaly  Lungs: lungs clear to auscultation bilaterally, no wheezes or rhonchi  Heart: regular rate and rhythm, no murmurs, rubs or gallops  Abdomen: soft, nontender  Neuro: no focal deficits, CN II-XII grossly intact, alert and " oriented  Psych:  mood stable, appears to have good insight and judgment    Recent Results (from the past 1008 hour(s))   Rapid Strep A Screen- Throat Swab    Collection Time: 07/17/18  9:32 AM   Result Value Ref Range    Rapid Strep A Antigen Group A Strep detected (!) No Group A Strep detected, presumptive negative         IMPRESSION/PLAN  1. Strep pharyngitis.  Treat with penicillin PO vs bicillin LA IM  Counseled on natural illness course.  Advised Tylenol and/or Ibuprofen for symptom management.  Advised staying home from work/school until without fever for 24 hours. Encouraged hydration and gargle with salt water. Advised to call with acute worsening of symptoms or inability to maintain adequate oral intake.

## 2021-06-20 NOTE — LETTER
Letter by Danielle Nari MD at      Author: Danielle Nair MD Service: -- Author Type: --    Filed:  Encounter Date: 6/15/2020 Status: (Other)         Vijaya 15, 2020     Patient: Najma Rodriguez   YOB: 1985   Date of Visit: 6/15/2020       To Whom It May Concern:    It is my medical opinion that Najma Rodriguez may return to work without restrictions on 6/8/2020.  .    If you have any questions or concerns, please don't hesitate to call.    Sincerely,        Electronically signed by Danielle Nair MD

## 2021-06-20 NOTE — LETTER
Letter by Jovita Charles CNP at      Author: Jovita Charles CNP Service: -- Author Type: --    Filed:  Encounter Date: 2/3/2020 Status: (Other)         February 3, 2020     Patient: Najma Rodriguez   YOB: 1985   Date of Visit: 2/3/2020       To Whom It May Concern:    It is my medical opinion that Najma Rodriguez may return to work on 02/4/2020.    If you have any questions or concerns, please don't hesitate to call.    Sincerely,        Electronically signed by Jovita Charles CNP

## 2021-06-20 NOTE — PROGRESS NOTES
Chief Complaint   Patient presents with     Back Pain     All over her back, has been ongoing for quite some time now          HPI:   Najma Rodriguez is a 32 y.o. female with  c/o upper back pain worsening for the last 3 weeks or so.  C/o numbness of 3/4/5 fingers bilaterally.  Gets achy in both arms.  Wakes her up at night.  No recent injury.  Using gabapentin for two years--helps with the pain. Helps her to feel relaxed.  Works serving school lunch.     Sometimes feels dizzy with a hard time breathing with anxiety.    ROS:  A 10 point comprehensive review of systems was negative except as noted.     Medications:  Current Outpatient Prescriptions on File Prior to Visit   Medication Sig Dispense Refill     acetaminophen (TYLENOL) 325 MG tablet Take 2 tablets (650 mg total) by mouth 3 (three) times a day as needed for pain. 100 tablet 1     albuterol (PROVENTIL HFA;VENTOLIN HFA) 90 mcg/actuation inhaler Inhale 2 puffs every 6 (six) hours as needed for wheezing.       cholecalciferol, vitamin D3, 5,000 unit capsule TAKE 1 CAPSULE BY MOUTH DAILY 90 capsule 3     gabapentin (NEURONTIN) 300 MG capsule Take 1 capsule (300 mg total) by mouth 3 (three) times a day. 90 capsule 5     hydrocortisone 1 % lotion Use over affected area twice daily 118 mL 0     ibuprofen (ADVIL,MOTRIN) 400 MG tablet TAKE 1 TABLET(400 MG) BY MOUTH THREE TIMES DAILY AS NEEDED FOR PAIN OR FEVER 30 tablet 0     menthol-sorbitol (THROAT LOZENGES) Lozg 1 Yaritza by Mucous Membrane route every hour as needed. 32 lozenge 3     PARoxetine (PAXIL) 20 MG tablet Take 2 tablets (40 mg total) by mouth every morning. 180 tablet 2     prenatal vit-iron fumarate-FA (PRENATAL VITAMIN) 27-0.8 mg Tab Take 1 tablet by mouth daily. 90 tablet 12     tobramycin (TOBREX) 0.3 % ophthalmic solution Apply 1 drop to eye every 4 (four) hours. 5 mL 1     No current facility-administered medications on file prior to visit.          Social History:  Social History  "  Substance Use Topics     Smoking status: Never Smoker     Smokeless tobacco: Never Used     Alcohol use No         Physical Exam:   Vitals:    09/28/18 1535   BP: 96/60   Pulse: 64   Resp: 16   Temp: 97.9  F (36.6  C)   TempSrc: Oral   SpO2: 99%   Weight: 192 lb (87.1 kg)   Height: 5' 1.5\" (1.562 m)       GENERAL:   Alert. Oriented.  EYES: Clear  HENT:  Ears: R TM pearly gray. Normal landmarks. L TM pearly gray.  Normal landmarks  Nose: Clear.  Sinuses: Nontender.  Oropharynx:  No erythema. No exudate.  NECK: Supple. No adenopathy.  LUNGS: Clear to ascultation.  No crackles.  No wheezing  HEART: RRR  SKIN:  No rash.   NECK:  No tenderness to percussion over spine.               No tenderness over paracervical muscles.               FROM   SHOULDER:  No tenderness to palpation  NEURO:  DTR's:  Triceps: 2/4  BL                                Bicips:  2/4  BL                               Brachioradialis:  2/4  BL                  MOTOR:  Finger Abduction/Adduction: 5/5  BL                                  Thumb Pincer:  5/5  BL                                   Wrist Flexion/Extension:  5/5  BL                                  Elbow Flexion/Extension:  5/5  BL                                  Shoulder Abduction/Adduction 5/5 BL                 Sensation intact to light touch BL   UPPER EXTREMITIES:  Radial pulse BL 2/4; Positive tinel's bilaterally, Positive phalen's bilaterally        Assessment/Plan:    1. Bilateral carpal tunnel syndrome  Symptoms most consistent with carpal tunnel syndrome.  Advised ice to wrist.  Wearing splints at and as much as possible during the day.  Tylenol as needed.  Recheck if not improving   - Wrist/Arm DME:    2. Anxiety Disorder Due To General Medical Condition With Panic Attacks  Dizziness and breathing problem due to generalized anxiety with panic attacks.  Reassured    3. Need for immunization against influenza  given  - Influenza, Seasonal,Quad Inj, 36+ MOS    4. Upper back " pain  Ongoing.  Gets relief from gabapentin.  Advised heat application.  Recheck as needed      5. Immunization due  Given.  - Tdap vaccine greater than or equal to 6yo IM           Henrique Connolly MD      9/28/2018    The following portions of the patient's history were reviewed and updated as appropriate: allergies, current medications, past family history, past medical history, past social history, past surgical history and problem list.

## 2021-06-20 NOTE — LETTER
Letter by Danielle Nair MD at      Author: Danielle Niar MD Service: -- Author Type: --    Filed:  Encounter Date: 7/29/2020 Status: (Other)               [address]  [place] [address]   PHONE [phone]  [phone]     July 29, 2020  Najma Alexandra [facility]  [address] [place] [address]    Dear Najma Alexandra:    Below are the results from your recent visit.  Your results show that your pap smear is normal.      Resulted Orders   Thyroid Cascade   Result Value Ref Range    TSH 1.74 0.30 - 5.00 uIU/mL   HM1 (CBC with Diff)   Result Value Ref Range    WBC 7.8 4.0 - 11.0 thou/uL    RBC 4.75 3.80 - 5.40 mill/uL    Hemoglobin 11.8 (L) 12.0 - 16.0 g/dL    Hematocrit 35.5 35.0 - 47.0 %    MCV 75 (L) 80 - 100 fL    MCH 24.8 (L) 27.0 - 34.0 pg    MCHC 33.2 32.0 - 36.0 g/dL    RDW 13.2 11.0 - 14.5 %    Platelets 327 140 - 440 thou/uL    MPV 7.4 7.0 - 10.0 fL    Neutrophils % 60 50 - 70 %    Lymphocytes % 30 20 - 40 %    Monocytes % 7 2 - 10 %    Eosinophils % 3 0 - 6 %    Basophils % 0 0 - 2 %    Neutrophils Absolute 4.7 2.0 - 7.7 thou/uL    Lymphocytes Absolute 2.3 0.8 - 4.4 thou/uL    Monocytes Absolute 0.6 0.0 - 0.9 thou/uL    Eosinophils Absolute 0.2 0.0 - 0.4 thou/uL    Basophils Absolute 0.0 0.0 - 0.2 thou/uL   Gynecologic Cytology (PAP Smear)   Result Value Ref Range    Case Report       Gynecologic Cytology Report                       Case: [id number]                                   Authorizing Provider:  Danielle Nair MD   Collected:           07/20/2020 150              Ordering Location:     [facility]            Received:            07/20/2020 1509                                     Medicine/OB                                                                  First Screen:          Roxy James, CT                                                                               (ASCP)                                                                        Rescreen:              Lidya Ernandez, CT                                                                           (ASCP)                                                                       Specimen:    SUREPATH PAP, SCREENING, Endocervical/cervical                                             Interpretation  Negative for squamous intraepithelial lesion or malignancy.      Negative for squamous intraepithelial lesion or malignancy    Result Flag Normal Normal    Specimen Adequacy       Satisfactory for evaluation, endocervical/transformation zone component absent    HPV Reflex? Yes regardless of result     HIGH RISK No     LMP/Menopause Date more than 2 weeks     Abnormal Bleeding No     Pt Status n/     Birth Control/Hormones None[2015     Previous Normal/Date 2015     Prev Abn Date/Dx no     Cervical Appearance Normal    HPV High Risk DNA Cervical   Result Value Ref Range    HPV Source SurePath     HPV16 DNA Negative NEG    HPV18 DNA Negative NEG    Other HR HPV Negative NEG    Final Diagnosis SEE NOTES       Comment:      This patient's sample is negative for HPV DNA.  This test was developed and its performance characteristics determined by the  [facility], [facility] Diagn[facility] Laboratory. It  has not been cleared or approved by the FDA. The laboratory is regulated under  CLIA as qualified to perform high-complexity testing. This test is used for  clinical purposes. It should not be regarded as investigational or for  research.  (Note)  METHODOLOGY:  The Roche neil 4800 system uses automated extraction,  simultaneous amplification of HPV (L1 region) and beta-globin,  followed by  real time detection of fluorescent labeled HPV and beta  globin using specific oligonucleotide probes . The test specifically  identifies types HPV 16 DNA and HPV 18 DNA while concurrently  detecting the rest of the high risk types (31, 33, 35, 39, 45, 51,  52, 56, 58, 59, 66  or 68).    COMMENTS:  This test is not intended for use as a screening device  for women under age 30 with normal cervical   cytology.  Results should  be correlated with cytologic and histologic findings. Close clinical  followup is recommended.        Specimen Description Cervical Cells       Comment:        Performed and/or entered by:  [address] [address]          If you have any questions or concerns, please do not hesitate to call.    Sincerely,      Danielle Nair MD  July 29, 2020

## 2021-06-21 NOTE — LETTER
Letter by Lindsay Stewart MD at      Author: Lindsay Stewart MD Service: -- Author Type: --    Filed:  Encounter Date: 1/12/2021 Status: (Other)         January 12, 2021     Patient: Najma Rodriguez   YOB: 1985   Date of Visit: 1/12/2021       To Whom It May Concern:    It is my medical opinion that Najma Rodriguez may return to full duty immediately with no restrictions. She was seen by me on 1/12/2021 for a non-Covid related issue.    If you have any questions or concerns, please don't hesitate to call.    Sincerely,        Electronically signed by Lindsay Stewart MD

## 2021-06-21 NOTE — LETTER
Letter by Radha Baez RN at      Author: Radha Baez RN Service: -- Author Type: --    Filed:  Encounter Date: 3/10/2021 Status: (Other)         March 11, 2021     Patient: Najma Rodriguez   YOB: 1985   Date of Visit: 3/10/2021       To Whom It May Concern:    It is my medical opinion that Najma Rodriguez suffers from panic attacks, for which she is currently being medicated and evaluated.  She may have a few episodes daily where she has a panic attack that lasts a few minutes.  .    If you have any questions or concerns, please don't hesitate to call.    Sincerely,        Electronically signed by Radha Baez RN

## 2021-06-21 NOTE — LETTER
Letter by Danielle Nair MD at      Author: Danielle Nair MD Service: -- Author Type: --    Filed:  Encounter Date: 3/22/2021 Status: (Other)         March 22, 2021     Patient: Najma Rodriguez   YOB: 1985   Date of Visit: 3/22/2021       To Whom it May Concern:    Najma Rodriguez was seen in my clinic on 3/22/2021.  She is currently suffering from ptsd, and panic attacks.  These will be triggered by loud voices, yelling, and anything that Najma perceives as being aggressive . I would encourage the workplace to maintain as calm of an environment as possible.  She will benefit from taking some 5 minute breaks when she has a panic attack.  We are working to improve these symptoms.    I support Najma in applying for FMLA at this time.      If you have any questions or concerns, please don't hesitate to call.    Sincerely,         Electronically signed by Danielle Nair MD

## 2021-06-21 NOTE — LETTER
Letter by Lindsay Stewart MD at      Author: Lindsay Stewart MD Service: -- Author Type: --    Filed:  Encounter Date: 3/11/2021 Status: (Other)         Najma Rodriguez  [address] [place] [address]             March 11, 2021         Dear Ms. Rodriguez,    Below are the results from your recent visit:    Resulted Orders   HM2(CBC w/o Differential)   Result Value Ref Range    WBC 6.9 4.0 - 11.0 thou/uL    RBC 5.45 (H) 3.80 - 5.40 mill/uL    Hemoglobin 14.2 12.0 - 16.0 g/dL    Hematocrit 44.1 35.0 - 47.0 %    MCV 81 80 - 100 fL    MCH 26.1 (L) 27.0 - 34.0 pg    MCHC 32.2 32.0 - 36.0 g/dL    RDW 12.9 11.0 - 14.5 %    Platelets 347 140 - 440 thou/uL    MPV 9.4 7.0 - 10.0 fL   Comprehensive Metabolic Panel   Result Value Ref Range    Sodium 141 136 - 145 mmol/L    Potassium 3.9 3.5 - 5.0 mmol/L    Chloride 102 98 - 107 mmol/L    CO2 26 22 - 31 mmol/L    Anion Gap, Calculation 13 5 - 18 mmol/L    Glucose 89 70 - 125 mg/dL    BUN 18 8 - 22 mg/dL    Creatinine 0.80 0.60 - 1.10 mg/dL    GFR MDRD Af Amer >60 >60 mL/min/1.73m2    GFR MDRD Non Af Amer >60 >60 mL/min/1.73m2    Bilirubin, Total 0.2 0.0 - 1.0 mg/dL    Calcium 8.8 8.5 - 10.5 mg/dL    Protein, Total 7.6 6.0 - 8.0 g/dL    Albumin 4.0 3.5 - 5.0 g/dL    Alkaline Phosphatase 56 45 - 120 U/L    AST 26 0 - 40 U/L    ALT 22 0 - 45 U/L    Narrative    Fasting Glucose reference range is 70-99 mg/dL per  American Diabetes Association (ADA) guidelines.   Glycosylated Hemoglobin A1c   Result Value Ref Range    Hemoglobin A1c 5.2 <=5.6 %   Thyroid Stimulating Hormone (TSH)   Result Value Ref Range    TSH 2.33 0.30 - 5.00 uIU/mL   Urinalysis-UC if Indicated   Result Value Ref Range    Color, UA Yellow Colorless, Yellow, Straw, Light Yellow    Clarity, UA Clear Clear    Glucose, UA Negative Negative    Protein, UA Negative Negative    Bilirubin, UA Negative Negative    Urobilinogen, UA 0.2 E.U./dL 0.2 E.U./dL, 1.0 E.U./dL    pH, UA 8.5 (H) 5.0 - 8.0    Blood, UA Moderate  (!) Negative    Ketones, UA Negative Negative    Nitrite, UA Negative Negative    Leukocytes, UA Negative Negative    Specific Gravity, UA 1.020 1.005 - 1.030    RBC, UA 5-10 (!) None Seen, 0-2 hpf    WBC, UA 0-5 None Seen, 0-5 hpf    Bacteria, UA Moderate (!) None Seen    Squam Epithel, UA 0-5 None Seen, 0-5 lpf    Amorphous, UA Many (!) None Seen    Narrative    Urine Culture ordered based on [facility]' criteria   HIV Antigen/Antibody Screening Cascade   Result Value Ref Range    HIV Antigen / Antibody Negative Negative    Narrative    Method is Abbott HIV Ag/Ab for the detection of HIV p24 antigen, HIV-1 antibodies and HIV-2 antibodies.   Hepatitis C Antibody (Anti-HCV)   Result Value Ref Range    Hepatitis C Ab Negative Negative   Culture, Urine   Result Value Ref Range    Culture Mixture of urogenital organisms      These results are all normal. It is good to know these do not contribute to anxiety or other problems.    Please call with questions or contact us using [employer].    Sincerely,        Electronically signed by Lindsay Stewart MD

## 2021-06-21 NOTE — LETTER
"Letter by Danielle Nair MD at      Author: Danielle Nair MD Service: -- Author Type: --    Filed:  Encounter Date: 3/16/2021 Status: (Other)         Najma Alexandra [facility]  [address] [place] [address]                March 16, 2021          Jose Ramon Yao,    We have been attempting to reach you about your heart monitor but have been unsuccessful.  Please read below instructions about sending the monitor back.      CMT called [employer] division [employer] at [phone] and was told that yes, pt was given a heart monitor to record \"events\" on March 2nd and it was a 3 day monitor.      [employer] did receive data for March 2nd and 3rd that was adequate.  March 4th, was the last day.      Pt will need to mail back the monitor as there is a return package already for her to send it back.  If she is having trouble, pt can call the number above and ask for a  and state the language and they will help her.  Please call [phone] if you have any issues with returning the monitor.      Dr. Nair wants to know IF you are taking your propanolol for your heart?  Please call back to respond to [phone].   Thanks.       Warmest regards,     Dr. Danielle Nair       "

## 2021-06-25 ENCOUNTER — COMMUNICATION - HEALTHEAST (OUTPATIENT)
Dept: FAMILY MEDICINE | Facility: CLINIC | Age: 36
End: 2021-06-25

## 2021-06-25 NOTE — PROGRESS NOTES
Clinic Care Coordination Contact  Program: [facility]  County:  [facility] Case #:  [facility] Worker:   Claribel #:   Subscriber ID #:   Date Applied:   Renewal:       Outreach:   6/3/21: Outreach attempted x 1. Left message on voicemail with call back information and requested return call.  Plan: FRW will call again within one week.       Health Insurance:      Referral/Screening:   Financial Resource Worker Screening     [facility] Benefits  Is patient requesting help applying for [facility] benefits?: Yes  Have you recently applied for any [facility] benefits?: No  How many people in your household?: 4  Do you buy/eat food together?: Yes  What is the monthly gross income for the household (wages, social security, workers comp, and pension)? : 1800  Is patient requesting an increase in SNAP/CASH?: No  Are you due for a renewal?: No  Did you have an income change?: Yes  Was there a household change?: No     Insurance:  [place]?: No  Is this an insurance renewal?: No  Is this a new insurance application request?: No  Have you recently applied for insurance?: No  How many people in your household? : 4  Do you file taxes?: Yes  How many dependents do you claim?: 3  What is the monthly gross income for the household (wages, social security, workers comp, and pension)? : 1800        Care Coordination team will tell patient:   Thank you for answering all the questions, based on screening questions, our Financial Resource Worker will reach out to you with additional questions and next steps.       Goals Addressed:

## 2021-06-25 NOTE — PROGRESS NOTES
Clinic Care Coordination Contact  Community Health Worker Initial Outreach    CHW Initial Information Gathering:  Referral Source: PCP  Preferred Hospital: [facility]  [phone]  Preferred Urgent Care: [facility], [phone]  Current living arrangement:: I live in a private home with family  Type of residence:: Private home - stairs  Community Resources: None  Supplies Currently Used at Home: None  Equipment Currently Used at Home: none  Informal Support system:: Family, Friends  No PCP office visit in Past Year: No  Transportation means:: None  CHW Additional Questions  If ED/Hospital discharge, follow-up appointment scheduled as recommended?: N/A  MyChart active?: No    Patient accepts CC: Yes. Patient scheduled for assessment with CCC PATY on 6/03/2021 at 4:30 PM. Patient noted desire to discuss [facility] benefits.     Financial Resource Worker Screening    [facility] Benefits  Is patient requesting help applying for [facility] benefits?: Yes  Have you recently applied for any [facility] benefits?: No  How many people in your household?: 4  Do you buy/eat food together?: Yes  What is the monthly gross income for the household (wages, social security, workers comp, and pension)? : 1800  Is patient requesting an increase in SNAP/CASH?: No  Are you due for a renewal?: No  Did you have an income change?: Yes  Was there a household change?: No    Insurance:  [place]?: No  Is this an insurance renewal?: No  Is this a new insurance application request?: No  Have you recently applied for insurance?: No  How many people in your household? : 4  Do you file taxes?: Yes  How many dependents do you claim?: 3  What is the monthly gross income for the household (wages, social security, workers comp, and pension)? : 1800         Care Coordination team will tell patient:   Thank you for answering all the questions, based on screening questions, our Financial Resource  Worker will reach out to you with additional questions and next steps.

## 2021-06-26 NOTE — PROGRESS NOTES
Progress Notes by Esther Arvizu LPN at 7/17/2018  9:20 AM     Author: Esther Arvizu LPN Service: -- Author Type: Licensed Nurse    Filed: 7/17/2018 12:02 PM Encounter Date: 7/17/2018 Status: Attested    : Esther Arvizu LPN (Licensed Nurse) Cosigner: Marychuy Lombardi MD at 7/17/2018 12:34 PM    Attestation signed by Marychuy Lombardi MD at 7/17/2018 12:34 PM    Reviewed and agree with plan.   Dr. Marychuy Lombardi  7/17/2018                    Patient noted no adverse effects during 20 minutes post-injection.  Given thermometer.

## 2021-06-26 NOTE — PROGRESS NOTES
Clinic Care Coordination Contact     Pt was a no show for initial CCC phone assessment x1. CHW to re-attempt outreach and offer to reschedule if needed.    NOTE: SW attempted to call at 4:30pm and again at 5:10pm on 6/3/21. No answer either time.

## 2021-06-27 ENCOUNTER — HEALTH MAINTENANCE LETTER (OUTPATIENT)
Age: 36
End: 2021-06-27

## 2021-06-28 ENCOUNTER — OFFICE VISIT - HEALTHEAST (OUTPATIENT)
Dept: FAMILY MEDICINE | Facility: CLINIC | Age: 36
End: 2021-06-28

## 2021-06-28 DIAGNOSIS — R53.82 CHRONIC FATIGUE: ICD-10-CM

## 2021-06-28 DIAGNOSIS — D64.9 ANEMIA, UNSPECIFIED TYPE: ICD-10-CM

## 2021-06-28 DIAGNOSIS — G89.29 OTHER CHRONIC PAIN: ICD-10-CM

## 2021-06-28 DIAGNOSIS — F41.0 PANIC ATTACK: ICD-10-CM

## 2021-06-28 DIAGNOSIS — R10.13 EPIGASTRIC ABDOMINAL PAIN: ICD-10-CM

## 2021-06-28 RX ORDER — FERROUS SULFATE 325(65) MG
1 TABLET ORAL DAILY
Qty: 90 TABLET | Refills: 3 | Status: SHIPPED | OUTPATIENT
Start: 2021-06-28 | End: 2023-06-06

## 2021-06-28 RX ORDER — FAMOTIDINE 10 MG
20 TABLET ORAL 2 TIMES DAILY
Qty: 30 TABLET | Refills: 0 | Status: SHIPPED | OUTPATIENT
Start: 2021-06-28 | End: 2023-06-06

## 2021-06-28 RX ORDER — ACETAMINOPHEN 325 MG/1
TABLET ORAL
Qty: 300 TABLET | Refills: 1 | Status: SHIPPED | OUTPATIENT
Start: 2021-06-28 | End: 2022-05-23

## 2021-06-28 RX ORDER — PROPRANOLOL HYDROCHLORIDE 10 MG/1
10 TABLET ORAL 3 TIMES DAILY PRN
Qty: 30 TABLET | Refills: 3 | Status: SHIPPED | OUTPATIENT
Start: 2021-06-28 | End: 2023-06-06

## 2021-06-28 ASSESSMENT — MIFFLIN-ST. JEOR: SCORE: 1488.77

## 2021-06-28 ASSESSMENT — ANXIETY QUESTIONNAIRES
2. NOT BEING ABLE TO STOP OR CONTROL WORRYING: SEVERAL DAYS
1. FEELING NERVOUS, ANXIOUS, OR ON EDGE: MORE THAN HALF THE DAYS
3. WORRYING TOO MUCH ABOUT DIFFERENT THINGS: MORE THAN HALF THE DAYS
5. BEING SO RESTLESS THAT IT IS HARD TO SIT STILL: MORE THAN HALF THE DAYS
4. TROUBLE RELAXING: MORE THAN HALF THE DAYS
7. FEELING AFRAID AS IF SOMETHING AWFUL MIGHT HAPPEN: NEARLY EVERY DAY
GAD7 TOTAL SCORE: 14
6. BECOMING EASILY ANNOYED OR IRRITABLE: MORE THAN HALF THE DAYS
IF YOU CHECKED OFF ANY PROBLEMS ON THIS QUESTIONNAIRE, HOW DIFFICULT HAVE THESE PROBLEMS MADE IT FOR YOU TO DO YOUR WORK, TAKE CARE OF THINGS AT HOME, OR GET ALONG WITH OTHER PEOPLE: SOMEWHAT DIFFICULT

## 2021-07-04 NOTE — LETTER
Letter by Danielle Nair MD at      Author: Danielle Nair MD Service: -- Author Type: --    Filed:  Encounter Date: 6/25/2021 Status: (Other)         Wangramirez Nasrin [facility]  [address] [place] [address]                     June 25, 2021    Dear Najma Alexandra:    We've been unable to reach you by telephone to reschedule your doctor's appointment with Dr. Danielle Nair.   Due to schedule changes, we are asking that you call back at your earliest convenience to reschedule your appointment on Tuesday 07/06/2021 at 5:15.    Please disregard this letter if you've already reschedule.  Thank you for your cooperation.    If you have any questions or concerns, please don't hesitate to call.    Sincerely,        Electronically signed by Danielle Nair MD

## 2021-07-04 NOTE — LETTER
Letter by Anusha Zavala BSW at      Author: Anusha Zavala BSW Service: -- Author Type: --    Filed:  Encounter Date: 6/15/2021 Status: (Other)       Care Plan  About Me:    Patient Name:  Najma Rodriguez    YOB: 1985  Age:         35 y.o.   [facility] MRN:    [id number] Telephone Information:  Home Phone [phone]   Mobile [phone]       Address:  [address]  [place] [address] Email address:  [email or web address]      Emergency Contact(s)  Extended Emergency Contact Information    **No emergency contact data on file**        Primary language:  Noemi     needed? Yes   [facility]:  [phone] op. 1  Other communication barriers: English as a second language, Lack of coping  Preferred Method of Communication:     Current living arrangement: I live in a private home with family  Mobility Status/ Medical Equipment: Independent    Health Maintenance  Health Maintenance Reviewed: Not assessed    My Access Plan  Medical Emergency 911   Primary Clinic Line Danielle Nair MD - [phone]   24 Hour Appointment Line [phone] or  [phone]) (toll-free)   24 Hour Nurse Line [phone] (toll-free)   Preferred Urgent Care [facility] [phone]   Preferred Hospital [facility]  [phone]   [id number] - [place], [place] - [address] AT [facility] [address]     Behavioral Health Crisis Line The National Suicide Prevention Lifeline at 1-949.824.3040 or 911             My Care Team Members  Patient Care Team       Relationship Specialty Notifications Start End    Danielle Nair MD PCP - General   9/27/07     Phone: [phone] Fax: [phone]         [address] [place] [address]    Danielle Nair MD Assigned PCP   3/25/21     Phone: [phone] Fax: [phone]         [address] [place]  [address]    Anusha Zavala BSW Lead Care Coordinator Primary Care - CC Admissions 6/15/21     Fax: [phone]                 My Care Plans  Self Management and Treatment Plan  Goals and (Comments)  Goals        General    Financial Wellbeing (pt-stated)     Notes - Note created  6/15/2021 11:39 AM by Anusha Zavala BSW    Goal statement: I would like to find out if I am eligible for county benefits such as SNAP, cash assistance, etc. within the next 60 days.    Date goal set: 6/15/21  Barriers: English as a second language, financial concerns, difficult to reach via phone  Strengths: Engaged with CCC team when reachable  Date to achieve by: 8/15/21  Patient expressed understanding of goal: Yes    Action steps to achieve this goal:  1.  I will answer the phone when FRW contacts me on 6/22 at 1pm to complete a Combined (CAF) Application.  2.  I will provide accurate information about my income, household size, etc. to ensure that my eligibility is accurately determined.       Mental Health Management (pt-stated)     Notes - Note created  6/15/2021 11:39 AM by Anusha Zavala BSW    Goal statement: I would like to be referred for ARMHS and in-home therapy within the next 60 days.    Date goal set: 6/15/21  Barriers: English as a second language, financial concerns, difficult to reach via phone  Strengths: Engaged with CCC team when reachable  Date to achieve by: 8/15/21  Patient expressed understanding of goal: Yes    Action steps to achieve this goal:  1.  [facility] SW submitted ARMHS and in-home therapy referral to [facility] via secure e-mail on 6/15/21.  2.  I will answer the phone when [facility] calls to schedule an initial diagnostic assessment.  3.  I will continue to take all mental health medications as prescribed by my PCP.   4.  I will learn the names and phone numbers of my ARMHS worker and in-home therapist once assigned and provide this information to CHW at outreach.                 Advance Care Plans/Directives Type:        My Medical and Care Information  Problem List   Patient Active Problem List   Diagnosis   ? Functional Murmur   ? Staring Spells (Reported)   ? Vitamin D deficiency   ? Moderate episode of recurrent major depressive disorder (H)   ? Sciatica   ? Borderline personality disorder (H)   ? Chronic Tension-type Headache   ? 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 Chronic Pain   ? Lower Back Pain   ? Neuralgia   ? Post-traumatic Stress Disorder   ? Latent Tuberculosis   ? Obsessive compulsive disorder   ? Calcified granuloma of lung (H)      Current Medications and Allergies:  See printed Medication Report.    Care Coordination Start Date: 6/15/2021   Frequency of Care Coordination: monthly   Form Last Updated: 06/15/2021

## 2021-07-04 NOTE — LETTER
Letter by Anusha Zavala BSW at      Author: Anusha Zavala BSW Service: -- Author Type: --    Filed:  Encounter Date: 6/15/2021 Status: (Other)       CARE COORDINATION  [facility]  [address]  [place] [address]    Vijaya 15, 2021    Najma Alexandra [facility]  [address] [place] [address]      Dear Najma Alexandra,    I am a clinic care coordinator who works with Danielle Nair MD at the [facility]. I wanted to thank you for spending the time to talk with me.  Below is a description of clinic care coordination and how I can further assist you.      The clinic care coordination team is made up of a registered nurse,  and community health worker who understand the health care system. The goal of clinic care coordination is to help you manage your health and improve access to the health care system in the most efficient manner. The team can assist you in meeting your health care goals by providing education, coordinating services, strengthening the communication among your providers and supporting you with any resource needs.    Please feel free to contact the Community Health Worker at [phone] with any questions or concerns. We are focused on providing you with the highest-quality healthcare experience possible and that all starts with you.     Sincerely,     KLEBER Bowen, LSW    Enclosed: I have enclosed a copy of the Care Plan. This has helpful information and goals that we have talked about. Please keep this in an easy to access place to use as needed.

## 2021-07-04 NOTE — PROGRESS NOTES
Progress Notes by Anusha Zavala BSW at 6/15/2021 11:00 AM     Author: Anusha Zavala BSW Service: -- Author Type:     Filed: 6/15/2021  2:03 PM Encounter Date: 6/15/2021 Status: Signed    : Anusha Zavala BSW ()       Clinic Care Coordination Contact    Clinic Care Coordination Contact  OUTREACH    Visit Note:    Present for today's assessment is pt and CCC SW.    Najma Rodriguez is a 34yo female who is requesting assistance applying for [facility] benefits and has expressed interest in resuming outpatient therapy.     She has been  from her ex- since 2017. She now lives in public housing with her 3 children (age 17yo, 15yo and 9yo). She previously received SSI benefits for approximately 4 years (or so she estimates), but she wanted to start working.     She now works as a [employer] employee for [employer] and really enjoys this. 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 She has summers off. Her last day of work was 6/8/21. Her only income right now is child support payments which total $117/week (in total). She is not receiving any [facility] benefits.    She has been difficult to reach via phone by both myself and our FRW team in the past. Because she is not working for the summer, she will be much easier to contact at this time. She reports that any time of day is fine, but early afternoon (12pm-2pm) is best. She knows to expect a call from RANI Aguirre on 6/22 at 1pm.     She is also agreeable to a referral for ARM/in-home therapy through [facility], which was submitted at the conclusion of today's visit.    Plan:  1.) See FRW screening below.  2.) See goals.  3.) Begin scheduled outreach.    Financial Resource Worker Screening:    [facility] Benefits  Is patient requesting help applying for [facility] benefits?: Yes  Have you recently applied for any [facility] benefits?: No  How many  people in your household?: 4  Do you buy/eat food together?: Yes  What is the monthly gross income for the household (wages, social security, workers comp, and pension)? : 468  Is patient requesting an increase in SNAP/CASH?: No  Are you due for a renewal?: No  Did you have an income change?: Yes  Was there a household change?: No    Ins[place]:  [place]?: Yes  Is this an insurance renewal?: No  Is this a new insurance application request?: No  Have you recently applied for insurance?: No    Any other information for the FRW?: would like to apply for SNAP, cash, and rental assistance if she qualifies; last day of work was 6/8/21, now only receiving $117/week in child support    Referral Information:  Referral Source: PCP  Primary Diagnosis: Psychosocial    Chief Complaint   Patient presents with   ? Clinic Care Coordination - Initial   ? Clinic Care Coodination - Face To Face     Clinic Utilization  Difficulty keeping appointments:: No  Compliance Concerns: No  No-Show Concerns: No  No PCP office visit in Past Year: No     Utilization    Last refreshed: 6/15/2021 11:10 AM: Hospital Admissions 0           Last refreshed: 6/15/2021 11:10 AM: ED Visits 0           Last refreshed: 6/15/2021 11:10 AM: No Show Count (past year) 5              Current as of: 6/15/2021 11:10 AM            Clinical Concerns:  Current Medical Concerns: See Problem List below.    Current Behavioral Concerns: See Problem List below.    Education Provided to patient: Role of CCC.   Pain  Pain (GOAL):: No  Health Maintenance Reviewed: Not assessed  Clinical Pathway: None     Medication Management: Pt reports taking all medications as prescribed.     Functional Status:  Dependent ADLs:: Independent  Dependent IADLs:: Independent  Bed or wheelchair confined:: No  Mobility Status: Independent  Fallen 2 or more times in the past year?: No  Any fall with injury in the past year?: No    Living Situation:  Current living  arrangement:: I live in a private home with family  Type of residence:: Private home - stairs    Lifestyle & Psychosocial Needs:  Lifestyle   ? Physical activity     Days per week: 5 days     Minutes per session: 30 min   ? Stress: Rather much     Social Needs   ? Financial resource strain: Hard   ? Food insecurity     Worry: Sometimes true     Inability: Sometimes true   ? Transportation needs     Medical: No     Non-medical: No     Diet:: Regular  Inadequate nutrition (GOAL):: No  Tube Feeding: No  Inadequate activity/exercise (GOAL):: No  Significant changes in sleep pattern (GOAL): No  Transportation means:: Regular car(Self)     Judaism or spiritual beliefs that impact treatment:: No  Mental health DX:: Yes  Mental health DX how managed:: Other, Medication, In Home Counseling(Referred for [facility] & in-home therapy on 6/15/21)  Mental health management concern (GOAL):: Yes  Chemical Dependency Status: Not Applicable  Informal Support system:: Friends, Children     Socioeconomic History   ? Marital status: Single     Spouse name: Not on file   ? Number of children: 3   ? Years of education: Not on file   ? Highest education level: Not on file   Relationships   ? Social connections     Talks on phone: More than three times a week     Gets together: More than three times a week     Attends Caoda[employer] service: 1 to 4 times per year     Active member of club or organization: No     Attends meetings of clubs or organizations: Never     Relationship status:    ? Intimate partner violence     Fear of current or ex partner: No     Emotionally abused: No     Physically abused: No     Forced sexual activity: No     Tobacco Use   ? Smoking status: Never Smoker   ? Smokeless tobacco: Never Used   Substance and Sexual Activity   ? Alcohol use: No     Frequency: Never     Binge frequency: Never   ? Drug use: No   ? Sexual activity: Not Currently     Partners: Male     Community Resources: [facility],  Financial/Insurance  Supplies Currently Used at Home: None  Equipment Currently Used at Home: none  Type of Employment: Full-time    Advance Care Plan/Directive  Advanced Care Plans/Directives on file:: No  Advanced Care Plan/Directive Status: Considering Options    Referrals Placed: Mental Health, Financial Services  Outreach Frequency: monthly     Goals        Patient Stated    ? Financial Wellbeing (pt-stated)      Goal statement: I would like to find out if I am eligible for county benefits such as SNAP, cash assistance, etc. within the next 60 days.    Date goal set: 6/15/21  Barriers: English as a second language, financial concerns, difficult to reach via phone  Strengths: Engaged with CCC team when reachable  Date to achieve by: 8/15/21  Patient expressed understanding of goal: Yes    Action steps to achieve this goal:  1.  I will answer the phone when FRW contacts me on 6/22 at 1pm to complete a Combined (CAF) Application.  2.  I will provide accurate information about my income, household size, etc. to ensure that my eligibility is accurately determined.       ? Mental Health Management (pt-stated)      Goal statement: I would like to be referred for ARMHS and in-home therapy within the next 60 days.    Date goal set: 6/15/21  Barriers: English as a second language, financial concerns, difficult to reach via phone  Strengths: Engaged with CCC team when reachable  Date to achieve by: 8/15/21  Patient expressed understanding of goal: Yes    Action steps to achieve this goal:  1.  [facility] SW [facility] ARMHS [facility] on 6/15/21.  2.  I will answer the phone when [facility] calls to schedule an initial diagnostic assessment.  3.  I will continue to take all mental health medications as prescribed by my PCP.   4.  I will learn the names and phone numbers of my ARMHS worker and in-home therapist once assigned and provide this information to CHW at  outreach.           Future Appointments              In 3 weeks Danielle Nair MD [facility] MEGAN Sauer

## 2021-07-06 VITALS
HEIGHT: 61 IN | HEART RATE: 76 BPM | SYSTOLIC BLOOD PRESSURE: 98 MMHG | OXYGEN SATURATION: 100 % | TEMPERATURE: 98.5 F | BODY MASS INDEX: 35.65 KG/M2 | WEIGHT: 188.8 LBS | DIASTOLIC BLOOD PRESSURE: 62 MMHG

## 2021-07-07 NOTE — PROGRESS NOTES
OUTPATIENT VISIT NOTE                                                   Date of Visit: 6/28/2021     Chief Complaint   Chief Complaint   Patient presents with     Panic Attack     Medication Refill         History of Present Illness   Najma Rodriguez is a 35 y.o. female in for follow up.  She was seen in the ER on 3/22/2021 for anxiety due to a work situation.  She has had trouble getting another appointment due to work issues and her primary physician availability.  She has changed jobs and is now working as a  which she enjoys.  She walks regularly.  She had a referral put in for financial consultation and knows that she was unable to answer the phone because she was at work.  She would like to try to get that set up again.  She needs refills of her stomach medication, iron, propranolol which she has used for anxiety as needed and tylenol       MEDICATIONS   Current Outpatient Medications on File Prior to Visit   Medication Sig Dispense Refill     albuterol (PROAIR HFA;PROVENTIL HFA;VENTOLIN HFA) 90 mcg/actuation inhaler Inhale 2 puffs every 6 (six) hours as needed for wheezing. 2 Inhaler 3     cholecalciferol, vitamin D3, 125 mcg (5,000 unit) capsule TAKE 1 CAPSULE BY MOUTH DAILY 90 capsule 3     gabapentin (NEURONTIN) 300 MG capsule TAKE 1 CAPSULE(300 MG) BY MOUTH THREE TIMES DAILY 270 capsule 3     PARoxetine (PAXIL) 20 MG tablet TAKE 2 TABLETS(40 MG) BY MOUTH EVERY MORNING 180 tablet 3     prazosin (MINIPRESS) 1 MG capsule Take 1 capsule (1 mg total) by mouth at bedtime. 30 capsule 12     [DISCONTINUED] acetaminophen (TYLENOL) 325 MG tablet TAKE 2 TABLETS(650 MG) BY MOUTH THREE TIMES DAILY AS NEEDED FOR PAIN 300 tablet 1     [DISCONTINUED] famotidine (FOR PEPCID) 10 MG tablet Take 2 tablets (20 mg total) by mouth 2 (two) times a day. 30 tablet 0     [DISCONTINUED] ferrous sulfate 325 (65 FE) MG tablet Take 1 tablet (325 mg total) by mouth daily. 90 tablet 3     [DISCONTINUED] propranoloL  "(INDERAL) 10 MG tablet Take 1 tablet (10 mg total) by mouth 3 (three) times a day as needed (for panic attacks). 30 tablet 3     hydrocortisone 1 % lotion Use over affected area twice daily 118 mL 0     loratadine (CLARITIN) 10 mg tablet Take 1 tablet (10 mg total) by mouth daily. 30 tablet 11     [DISCONTINUED] ibuprofen (ADVIL,MOTRIN) 400 MG tablet TAKE 1 TABLET(400 MG) BY MOUTH THREE TIMES DAILY AS NEEDED FOR PAIN OR FEVER 30 tablet 0     No current facility-administered medications on file prior to visit.          SOCIAL HISTORY   Social History     Tobacco Use     Smoking status: Never Smoker     Smokeless tobacco: Never Used     Tobacco comment: no passive exposure   Substance Use Topics     Alcohol use: No     Frequency: Never     Binge frequency: Never           Physical Exam   Vitals:    06/28/21 1641   BP: 98/62   Patient Site: Right Arm   Patient Position: Sitting   Cuff Size: Adult Regular   Pulse: 76   Temp: 98.5  F (36.9  C)   TempSrc: Oral   SpO2: 100%   Weight: 188 lb 12.8 oz (85.6 kg)   Height: 5' 1\" (1.549 m)        GEN: Alert.  MS:  A&O.  Affect normal.  Mood normal. Eye contact normal.  Thoughts linear and logical.  Well-groomed.  Speech normal.  No psychomotor agitation or retardation.  Denies suicidal ideation.           Assessment and Plan   1. Chronic fatigue  Refilled iron  - ferrous sulfate 325 (65 FE) MG tablet; Take 1 tablet (325 mg total) by mouth daily.  Dispense: 90 tablet; Refill: 3    2. Anemia, unspecified type  Refilled iron   Lab Results   Component Value Date    HGB 14.2 03/10/2021      - ferrous sulfate 325 (65 FE) MG tablet; Take 1 tablet (325 mg total) by mouth daily.  Dispense: 90 tablet; Refill: 3    3. Epigastric abdominal pain  Stable heartburn.  Refilled pepcid  - famotidine (FOR PEPCID) 10 MG tablet; Take 2 tablets (20 mg total) by mouth 2 (two) times a day.  Dispense: 30 tablet; Refill: 0    4. Panic attack  Anxiety improved due to job change.  Refilled propranolol to " use as needed.  Continue walking.  - propranoloL (INDERAL) 10 MG tablet; Take 1 tablet (10 mg total) by mouth 3 (three) times a day as needed (for panic attacks).  Dispense: 30 tablet; Refill: 3    5. Other chronic pain  Refilled tylenol  Stable.  - acetaminophen (TYLENOL) 325 MG tablet; TAKE 2 TABLETS(650 MG) BY MOUTH THREE TIMES DAILY AS NEEDED FOR PAIN  Dispense: 300 tablet; Refill: 1     CA will look into having Care coordinators recontact her outside of her work hours-8-2; requests after 3 pm.   I told her if she gets a call and can't answer, call back when she is able.  Anxiety under good control.    Recheck in about 6 months.    Discussed signs / symptoms that warrant urgent / emergent medical attention.     Recheck if worsening or not improving.       Henrique Connolly MD        Pertinent History     The following portions of the patient's history were reviewed and updated as appropriate: allergies, current medications, past family history, past medical history, past social history, past surgical history and problem list.

## 2021-07-08 ASSESSMENT — ANXIETY QUESTIONNAIRES: GAD7 TOTAL SCORE: 14

## 2021-07-12 ENCOUNTER — PATIENT OUTREACH (OUTPATIENT)
Dept: CARE COORDINATION | Facility: CLINIC | Age: 36
End: 2021-07-12

## 2021-07-13 NOTE — PROGRESS NOTES
Clinic Care Coordination Contact  Community Health Worker Follow Up    Goals:   Goals Addressed as of 7/13/2021 at 9:01 AM                    7/12/21       Financial Wellbeing (pt-stated)   20%    Added 7/13/21 by Papo Brunner       Goal statement: I would like to find out if I am eligible for county benefits such as SNAP, cash assistance, etc. within the next 60 days.     Date goal set: 6/15/21  Barriers: English as a second language, financial concerns, difficult to reach via phone  Strengths: Engaged with CCC team when reachable  Date to achieve by: 8/15/21  Patient expressed understanding of goal: Yes     Action steps to achieve this goal:  1.  I will answer the phone when FRW contacts me on 6/22 at 1pm to complete a Combined (CAF) Application.  2.  I will provide accurate information about my income, household size, etc. to ensure that my eligibility is accurately determined.      Goal update: 07/13/2021         Mental Health Management. (pt-stated)   20%    Added 7/13/21 by Papo Brunner      Goal statement: I would like to be referred for ARMHS and in-home therapy within the next 60 days.     Date goal set: 6/15/21  Barriers: English as a second language, financial concerns, difficult to reach via phone  Strengths: Engaged with CCC team when reachable  Date to achieve by: 8/15/21  Patient expressed understanding of goal: Yes     Action steps to achieve this goal:  1.  [facility] SW submitted ARMHS and in-home therapy referral to [facility] via secure e-mail on 6/15/21.  2.  I will answer the phone when [facility] calls to schedule an initial diagnostic assessment.  3.  I will continue to take all mental health medications as prescribed by my PCP.   4.  I will learn the names and phone numbers of my ARMHS worker and in-home therapist once assigned and provide this information to CHW at outreach.       Goal update: 07/13/2021        Intervention and Education during outreach:  -CHW called and spoke with patient  who stated that she has not received call from FRW and CHW reminded patient to make sure her phone works when FRW calls.  -Patient stated she does not know how to check voice message.  -Patient stated that she still needs help with County benefits.  -Patient also stated that she does not know if she has ARM worker and in-home therapist at [facility].   -Patient denied immediate coordination assistance and no additional resource needed.  -Patient was informed to call with questions or concerns.         CHW Next Outreach: In one month.

## 2021-07-18 ENCOUNTER — TRANSFERRED RECORDS (OUTPATIENT)
Dept: HEALTH INFORMATION MANAGEMENT | Facility: CLINIC | Age: 36
End: 2021-07-18

## 2021-07-20 ENCOUNTER — PATIENT OUTREACH (OUTPATIENT)
Dept: NURSING | Facility: CLINIC | Age: 36
End: 2021-07-20

## 2021-07-20 DIAGNOSIS — Z59.9 FINANCIAL DIFFICULTIES: ICD-10-CM

## 2021-07-20 DIAGNOSIS — F43.10 POSTTRAUMATIC STRESS DISORDER: ICD-10-CM

## 2021-07-20 DIAGNOSIS — Z86.59 HISTORY OF RECURRENT PSYCHOSOCIAL STRESSORS: ICD-10-CM

## 2021-07-20 DIAGNOSIS — F33.1 MODERATE EPISODE OF RECURRENT MAJOR DEPRESSIVE DISORDER (H): Primary | ICD-10-CM

## 2021-07-20 DIAGNOSIS — F06.4 ANXIETY DISORDER DUE TO MEDICAL CONDITION: ICD-10-CM

## 2021-07-20 SDOH — ECONOMIC STABILITY - INCOME SECURITY: PROBLEM RELATED TO HOUSING AND ECONOMIC CIRCUMSTANCES, UNSPECIFIED: Z59.9

## 2021-07-20 SDOH — ECONOMIC STABILITY: TRANSPORTATION INSECURITY
IN THE PAST 12 MONTHS, HAS THE LACK OF TRANSPORTATION KEPT YOU FROM MEDICAL APPOINTMENTS OR FROM GETTING MEDICATIONS?: NO

## 2021-07-20 SDOH — HEALTH STABILITY: PHYSICAL HEALTH: ON AVERAGE, HOW MANY MINUTES DO YOU ENGAGE IN EXERCISE AT THIS LEVEL?: 30 MIN

## 2021-07-20 SDOH — ECONOMIC STABILITY: TRANSPORTATION INSECURITY
IN THE PAST 12 MONTHS, HAS LACK OF TRANSPORTATION KEPT YOU FROM MEETINGS, WORK, OR FROM GETTING THINGS NEEDED FOR DAILY LIVING?: NO

## 2021-07-20 SDOH — ECONOMIC STABILITY: FOOD INSECURITY: WITHIN THE PAST 12 MONTHS, YOU WORRIED THAT YOUR FOOD WOULD RUN OUT BEFORE YOU GOT MONEY TO BUY MORE.: SOMETIMES TRUE

## 2021-07-20 SDOH — ECONOMIC STABILITY: INCOME INSECURITY: IN THE LAST 12 MONTHS, WAS THERE A TIME WHEN YOU WERE NOT ABLE TO PAY THE MORTGAGE OR RENT ON TIME?: NO

## 2021-07-20 SDOH — ECONOMIC STABILITY: FOOD INSECURITY: WITHIN THE PAST 12 MONTHS, THE FOOD YOU BOUGHT JUST DIDN'T LAST AND YOU DIDN'T HAVE MONEY TO GET MORE.: SOMETIMES TRUE

## 2021-07-20 SDOH — HEALTH STABILITY: PHYSICAL HEALTH: ON AVERAGE, HOW MANY DAYS PER WEEK DO YOU ENGAGE IN MODERATE TO STRENUOUS EXERCISE (LIKE A BRISK WALK)?: 3 DAYS

## 2021-07-20 ASSESSMENT — LIFESTYLE VARIABLES
HOW OFTEN DO YOU HAVE A DRINK CONTAINING ALCOHOL: NEVER
HOW OFTEN DO YOU HAVE SIX OR MORE DRINKS ON ONE OCCASION: NEVER

## 2021-07-20 ASSESSMENT — SOCIAL DETERMINANTS OF HEALTH (SDOH)
WITHIN THE LAST YEAR, HAVE YOU BEEN HUMILIATED OR EMOTIONALLY ABUSED IN OTHER WAYS BY YOUR PARTNER OR EX-PARTNER?: NO
WITHIN THE LAST YEAR, HAVE YOU BEEN KICKED, HIT, SLAPPED, OR OTHERWISE PHYSICALLY HURT BY YOUR PARTNER OR EX-PARTNER?: NO
IN A TYPICAL WEEK, HOW MANY TIMES DO YOU TALK ON THE PHONE WITH FAMILY, FRIENDS, OR NEIGHBORS?: MORE THAN THREE TIMES A WEEK
WITHIN THE LAST YEAR, HAVE YOU BEEN AFRAID OF YOUR PARTNER OR EX-PARTNER?: NO
HOW OFTEN DO YOU ATTEND CHURCH OR RELIGIOUS SERVICES?: MORE THAN 4 TIMES PER YEAR
HOW OFTEN DO YOU ATTENT MEETINGS OF THE CLUB OR ORGANIZATION YOU BELONG TO?: NEVER
HOW OFTEN DO YOU GET TOGETHER WITH FRIENDS OR RELATIVES?: MORE THAN THREE TIMES A WEEK
HOW HARD IS IT FOR YOU TO PAY FOR THE VERY BASICS LIKE FOOD, HOUSING, MEDICAL CARE, AND HEATING?: HARD
DO YOU BELONG TO ANY CLUBS OR ORGANIZATIONS SUCH AS CHURCH GROUPS UNIONS, FRATERNAL OR ATHLETIC GROUPS, OR SCHOOL GROUPS?: NO
WITHIN THE LAST YEAR, HAVE TO BEEN RAPED OR FORCED TO HAVE ANY KIND OF SEXUAL ACTIVITY BY YOUR PARTNER OR EX-PARTNER?: NO

## 2021-07-20 NOTE — LETTER
[facility] Home  Complex Care Plan  About Me:    Patient Name:  Najma Rodriguez    YOB: 1985  Age:         35 year old   [facility] MRN:    [id number] Telephone Information:  Home Phone [phone]   Mobile [phone]       Address:  [address]  [place] [address] Email address:  [email or web address]      Emergency Contact(s)    Name Relationship Lgl Grd Work Phone Home Phone Mobile Phone           Primary language:  Noemi     needed? Yes   [employer]:  [phone] op. 1  Other communication barriers:    Preferred Method of Communication:     Current living arrangement:    Mobility Status/ Medical Equipment:      Health Maintenance  Health Maintenance Reviewed:      My Access Plan  Medical Emergency 911   Primary Clinic Line   - [phone]   24 Hour Appointment Line [phone] or  [phone] ([phone]) (toll-free)   24 Hour Nurse Line [phone] (toll-free)   Preferred Urgent Care     Preferred Hospital     Preferred Pharmacy No Pharmacies Listed   Behavioral Health Crisis Line The National Suicide Prevention Lifeline at 1-645.954.8585 or 911             My Care Team Members  Patient Care Team       Relationship Specialty Notifications Start End    Danielle Nair MD PCP - General   9/27/07     Phone: [phone] Fax: [phone]         [address] [facility] [address] [place] [address]    Papo Brunner Community Health Worker Primary Care - [facility]  6/15/21     Anusha Zavala LSW Lead Care Coordinator Primary Care - [facility] Admissions 6/15/21     Danielle Nair MD Assigned PCP   6/16/21     Phone: [phone] Fax: [phone]         [address] [place] [address] [address] [place] [address]            My Care Plans  Self Management and Treatment Plan  Goals and (Comments)  Goals        General     Financial Wellbeing (pt-stated)      Notes - Note edited  7/13/2021  9:00 AM by Papo Brunner      Goal statement: I would like to find out if I am eligible for county benefits  such as SNAP, cash assistance, etc. within the next 60 days.     Date goal set: 6/15/21  Barriers: English as a second language, financial concerns, difficult to reach via phone  Strengths: Engaged with CCC team when reachable  Date to achieve by: 8/15/21  Patient expressed understanding of goal: Yes     Action steps to achieve this goal:  1.  I will answer the phone when FRW contacts me on 6/22 at 1pm to complete a Combined (CAF) Application.  2.  I will provide accurate information about my income, household size, etc. to ensure that my eligibility is accurately determined.      Goal update: 07/13/2021       Mental Health Management. (pt-stated)      Notes - Note created  7/13/2021  8:59 AM by Papo Brunner     Goal statement: I would like to be referred for ARMHS and in-home therapy within the next 60 days.     Date goal set: 6/15/21  Barriers: English as a second language, financial concerns, difficult to reach via phone  Strengths: Engaged with CCC team when reachable  Date to achieve by: 8/15/21  Patient expressed understanding of goal: Yes     Action steps to achieve this goal:  1.  [facility] SW submitted ARMHS and in-home therapy referral to [facility] via secure e-mail on 6/15/21.  2.  I will answer the phone when [facility] calls to schedule an initial diagnostic assessment.  3.  I will continue to take all mental health medications as prescribed by my PCP.   4.  I will learn the names and phone numbers of my ARMHS worker and in-home therapist once assigned and provide this information to CHW at outreach.       Goal update: 07/13/2021             Action Plans on File:                       Advance Care Plans/Directives Type:        My Medical and Care Information  Problem List   Patient Active Problem List   Diagnosis     Functional Murmur     Staring Spells (Reported)     Vitamin D deficiency     Moderate episode of recurrent major depressive disorder (H)     Sciatica     Borderline personality  disorder (H)     Chronic Tension-type Headache     Anxiety Disorder Due To General Medical Condition With Panic Attacks     Chronic Pain     Lower Back Pain     Neuralgia     Post-traumatic Stress Disorder     Latent Tuberculosis     Obsessive compulsive disorder     Calcified granuloma of lung (H)      Current Medications and Allergies:  See printed Medication Report.    Care Coordination Start Date: 6/15/2021   Frequency of Care Coordination: 6 weeks   Form Last Updated: 07/20/2021

## 2021-07-20 NOTE — PROGRESS NOTES
Clinic Care Coordination Contact    Situation: 45 day chart review completed by SW care coordinator.    Background:   - Most recent SW assessment completed on 6/15/21. Most recent Complex Care Plan generated today.  - Pt has been referred and re-referred to FRW multiple times, but does not answer phone calls or return voicemail messages at scheduled appointment times. Thus, FRW team has not been able to assist with financial wellbeing goal(s).  - Per chart review, pt has continued to follow plan of care and is making progress towards goal completion.  - Pt was seen in the ED at [facility] on 7/18/21. Discharge dx = gallstones. Pt was instructed to f/u with PCP in-clinic and f/u with surgical services for removal of gallstones as an outpatient.  - Routing chart to CCC RN for review due to recent ED visit and need for f/u with outpatient surgical services.  - Last outreach by CHW on 7/12/21. CHW and SW consulted today (7/20/21) re: pt's recent ED visit. CHW to get pt scheduled for an ED f/u visit with PCP this week.    Assessment: Recent outreaches, active goals, current care plan, and pertinent office visit notes reviewed. Chart review to be completed by PATY every 45 days and updated Complex Care Plan to be generated every 90 days during continued course of CCC enrollment.      Plan/Recommendations:   1.) Continue scheduled outreach.

## 2021-07-21 ENCOUNTER — OFFICE VISIT (OUTPATIENT)
Dept: FAMILY MEDICINE | Facility: CLINIC | Age: 36
End: 2021-07-21
Payer: COMMERCIAL

## 2021-07-21 VITALS
SYSTOLIC BLOOD PRESSURE: 94 MMHG | TEMPERATURE: 98.8 F | WEIGHT: 186 LBS | OXYGEN SATURATION: 98 % | DIASTOLIC BLOOD PRESSURE: 58 MMHG | HEART RATE: 76 BPM | RESPIRATION RATE: 16 BRPM | BODY MASS INDEX: 35.14 KG/M2

## 2021-07-21 DIAGNOSIS — J98.4 CALCIFIED GRANULOMA OF LUNG: ICD-10-CM

## 2021-07-21 DIAGNOSIS — Z23 HIGH PRIORITY FOR 2019-NCOV VACCINE: ICD-10-CM

## 2021-07-21 DIAGNOSIS — R56.9 CONVULSIONS, UNSPECIFIED CONVULSION TYPE (H): ICD-10-CM

## 2021-07-21 DIAGNOSIS — K80.70 CALCULUS OF GALLBLADDER AND BILE DUCT WITHOUT CHOLECYSTITIS OR OBSTRUCTION: Primary | ICD-10-CM

## 2021-07-21 PROCEDURE — 91301 COVID-19,PF,MODERNA: CPT | Performed by: FAMILY MEDICINE

## 2021-07-21 PROCEDURE — 0011A COVID-19,PF,MODERNA: CPT | Performed by: FAMILY MEDICINE

## 2021-07-21 PROCEDURE — 99213 OFFICE O/P EST LOW 20 MIN: CPT | Performed by: FAMILY MEDICINE

## 2021-07-21 NOTE — PROGRESS NOTES
OUTPATIENT VISIT NOTE                                                   Date of Visit: 7/21/2021     Chief Complaint   gall bladder and Imm/Inj           History of Present Illness   Najma Rodriguez is a 35 year old female for follow up of ER visit for abdominal pain.  Seen at [facility] on 7/18/2021 for RUQ pain.  US showed multiple stones with some distension and some stones in the Gallbladder neck.  Pain improved over the course of her visit and she was discharged home.  She has been doing OK at home, No fever, nausea, vomiting, diarrhea.  Pain has decreased substantially.  Eating OK  Hasn't taken any pain medications.       MEDICATIONS   Current Outpatient Medications   Medication     acetaminophen (TYLENOL) 325 MG tablet     albuterol (PROAIR HFA;PROVENTIL HFA;VENTOLIN HFA) 90 mcg/actuation inhaler     cholecalciferol, vitamin D3, 125 mcg (5,000 unit) capsule     famotidine (FOR PEPCID) 10 MG tablet     famotidine (FOR PEPCID) 10 MG tablet     ferrous sulfate 325 (65 FE) MG tablet     gabapentin (NEURONTIN) 300 MG capsule     hydrocortisone 1 % lotion     ibuprofen (ADVIL,MOTRIN) 400 MG tablet     loratadine (CLARITIN) 10 mg tablet     PARoxetine (PAXIL) 20 MG tablet     prazosin (MINIPRESS) 1 MG capsule     propranoloL (INDERAL) 10 MG tablet     No current facility-administered medications for this visit.         SOCIAL HISTORY   Social History     Tobacco Use     Smoking status: Never Smoker     Smokeless tobacco: Never Used     Tobacco comment: no passive exposure   Substance Use Topics     Alcohol use: No           Physical Exam   Vitals:    07/21/21 1538   BP: 94/58   Pulse: 76   Resp: 16   Temp: 98.8  F (37.1  C)   TempSrc: Oral   SpO2: 98%   Weight: 84.4 kg (186 lb)        GEN:  NAD  LUNGS:  Clear to auscultation without wheezing.  Normal effort.  HEART:  RRR without murmur, rub or gallop   ABDOMEN:  +BS. Very mild RUQ tenderness. Soft, no guarding, rebound, rigidity,mass, or organomegaly. No CVA tenderness          Assessment and Plan   Calculus of gallbladder and bile duct without cholecystitis or obstruction  Multiple stones.  Discussed surgery and rationale.  Referred to surgery.  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  Discussed warning/alarming signs/symptoms  - Adult General Surg Referral    High priority for 2019-nCoV vaccine  Given today  - COVID-19,PF,MODERNA    Convulsions, unspecified convulsion type (H)  Stable.    Calcified granuloma of lung (H)  No current symptoms             Discussed signs / symptoms that warrant urgent / emergent medical attention.     Recheck if worsening or not improving.       Henrique Connolly MD          Pertinent History     The following portions of the patient's history were reviewed and updated as appropriate: allergies, current medications, past family history, past medical history, past social history, past surgical history and problem list.

## 2021-08-16 ENCOUNTER — PATIENT OUTREACH (OUTPATIENT)
Dept: CARE COORDINATION | Facility: CLINIC | Age: 36
End: 2021-08-16

## 2021-08-16 NOTE — PROGRESS NOTES
Clinic Care Coordination Contact  [facility]/Voicemail       Clinical Data: Care Coordinator Outreach  Outreach attempted x 1. CHW called and unable to reach nor able to leave message on patient's voicemail to request a call back due to patient's phone had no ringing tone when call and no one answered.     Plan: Care Coordinator will try to reach patient again in two weeks.

## 2021-08-23 ENCOUNTER — IMMUNIZATION (OUTPATIENT)
Dept: NURSING | Facility: CLINIC | Age: 36
End: 2021-08-23
Attending: FAMILY MEDICINE
Payer: COMMERCIAL

## 2021-08-23 PROCEDURE — 0012A PR COVID VAC MODERNA 100 MCG/0.5 ML IM: CPT

## 2021-08-23 PROCEDURE — 91301 PR COVID VAC MODERNA 100 MCG/0.5 ML IM: CPT

## 2021-08-31 ENCOUNTER — PATIENT OUTREACH (OUTPATIENT)
Dept: NURSING | Facility: CLINIC | Age: 36
End: 2021-08-31

## 2021-08-31 NOTE — PROGRESS NOTES
Clinic Care Coordination Contact    Community Health Worker Follow Up  Care Gaps:   Health Maintenance Due   Topic Date Due     PREVENTIVE CARE VISIT  Never done     ADVANCE CARE PLANNING  Never done     Pneumococcal Vaccine: Pediatrics (0 to 5 Years) and At-Risk Patients (6 to 64 Years) (1 of 2 - PPSV23) Never done     INFLUENZA VACCINE (1) 09/01/2021     Scheduled on 11/02/2021 at 5:05 PM.    Goals:   Goals Addressed as of 8/31/2021 at 9:40 AM                    Today    7/20/21       Financial Wellbeing (pt-stated)   30%  20%    Added 7/13/21 by Papo Brunner       Goal statement: I would like to find out if I am eligible for [facility] benefits such as SNAP, cash assistance, etc. within the next 60 days.     Date goal set: 6/15/21  Barriers: English as a second language, financial concerns, difficult to reach via phone  Strengths: Engaged with CCC team when reachable  Date to achieve by: 8/15/21  Patient expressed understanding of goal: Yes     Action steps to achieve this goal:  1.  I only need SNAP and energy assistance at this time and I applied yesterday at [facility].   2.  I will follow up with [facility] or go to the [facility] to speak with a person who helped me applied.      Goal update: 07/13/2021; 8/31/2021         Mental Health Management. (pt-stated)   30%  20%    Added 7/13/21 by Papo Brunner      Goal statement: I would like to be referred for [facility] and in-home therapy within the next 60 days.     Date goal set: 6/15/21  Barriers: English as a second language, financial concerns, difficult to reach via phone  Strengths: Engaged with CCC team when reachable  Date to achieve by: 8/15/21  Patient expressed understanding of goal: Yes     Action steps to achieve this goal:  1.  I will answer my phone when [facility] calls me for [facility] services.  2.  I will contact CHW for coordination assistance when needed.          Goal update: 07/13/2021; 8/31/2021        Intervention and Education during  outreach:  -Patient went to [facility] and got helps applied for SNAP and will apply for energy assistance.  -Patient to follow up with a friend at [facility] who helped her completed application.  -Patient is scheduled for preventative care with PCP on 11/02/2021.  -Patient will start working again and has training tomorrow, 9/01/2021.  -Patient has not received [facility] services and in-home therapy, CHW called Radha at [facility] and left a voice message to return call.  -Patient denied for immediate coordination assistance and no additional resource needed at this time.  -CHW informed patient to call with questions or concerns.       CHW Next Outreach: In one month.

## 2021-09-25 ENCOUNTER — PATIENT OUTREACH (OUTPATIENT)
Dept: CARE COORDINATION | Facility: CLINIC | Age: 36
End: 2021-09-25

## 2021-09-25 NOTE — PROGRESS NOTES
Clinic Care Coordination Contact    Situation: 45 day chart review completed by SW care coordinator.    Background:   - Most recent SW assessment completed on 6/15/21. Most recent Complex Care Plan generated today.  - Per chart review, pt has continued to follow plan of care, remains actively engaged with CCC team, and is making progress towards goal completion.  - No ED visits or hospitalizations in the past 45 days.   - Last outreach by CHW on 8/31/21. Care gaps appropriately reviewed/addressed. CHW to f/u again in 1 month.    Assessment: Recent outreaches, active goals, current care plan, and pertinent office visit notes reviewed. Chart review to be completed by SW every 45 days and updated Complex Care Plan to be generated every 90 days during continued course of CCC enrollment.      Plan/Recommendations:   1.) Continue scheduled outreach.

## 2021-09-25 NOTE — LETTER
[facility]  Patient Centered Plan of Care  About Me:        Patient Name:  Najma Rodriguez    YOB: 1985  Age:         35 year old   [place] MRN:    [id number] Telephone Information:  Home Phone [phone]   Mobile [phone]       Address:  [address]  [place] [address] Email address:  [email or web address]      Emergency Contact(s)    Name Relationship Lgl Grd Work Phone Home Phone Mobile Phone           Primary language:  Noemi     needed? Yes   [facility] [facility]:  [phone] op. 1  Other communication barriers:    Preferred Method of Communication:     Current living arrangement:    Mobility Status/ Medical Equipment:      Health Maintenance  Health Maintenance Reviewed:      My Access Plan  Medical Emergency 911   Primary Clinic Line [facility] [phone]   24 Hour Appointment Line [phone] or  [phone] ([phone]) (toll-free)   24 Hour Nurse Line [phone] (toll-free)   Preferred Urgent Care     Preferred Hospital     Preferred Pharmacy [id number] - [place], [place] - [address] AT [facility] [address]     Behavioral Health Crisis Line The National Suicide Prevention Lifeline at 1-175.949.7879 or 911             My Care Team Members  Patient Care Team       Relationship Specialty Notifications Start End    Danielle Nair MD PCP - General   9/27/07     Phone: [phone] Fax: [phone]         [address] [place] [address]    Papo Brunner Community Health Worker Primary Care - CC  6/15/21     Phone: [phone] Fax: [phone]         [address] [facility] [address] [facility] [address]    Anusha Zavala LSW Lead Care Coordinator Primary Care - [facility] Admissions 6/15/21     Lindsay Stewart MD Assigned PCP   7/25/21     Phone: [phone] Fax: [phone]         [address] [place] [address]            My Care Plans  Self Management and Treatment Plan  Goals and  (Comments)  Goals        General     Financial Wellbeing (pt-stated)      Notes - Note edited  8/31/2021  9:37 AM by Papo Brunner      Goal statement: I would like to find out if I am eligible for [facility] benefits such as SNAP, cash assistance, etc. within the next 60 days.     Date goal set: 6/15/21  Barriers: English as a second language, financial concerns, difficult to reach via phone  Strengths: Engaged with CCC team when reachable  Date to achieve by: 8/15/21  Patient expressed understanding of goal: Yes     Action steps to achieve this goal:  1.  I only need SNAP and energy assistance at this time and I applied yesterday at [facility].   2.  I will follow up with [facility] or go to the [facility] to speak with a person who helped me applied.      Goal update: 07/13/2021; 8/31/2021       Mental Health Management. (pt-stated)      Notes - Note edited  8/31/2021  9:39 AM by Papo Brunner     Goal statement: I would like to be referred for [facility] and in-home therapy within the next 60 days.     Date goal set: 6/15/21  Barriers: English as a second language, financial concerns, difficult to reach via phone  Strengths: Engaged with CCC team when reachable  Date to achieve by: 8/15/21  Patient expressed understanding of goal: Yes     Action steps to achieve this goal:  1.  I will answer my phone when [facility] calls me for [facility] services.  2.  I will contact CHW for coordination assistance when needed.          Goal update: 07/13/2021; 8/31/2021             Action Plans on File:                       Advance Care Plans/Directives Type:        My Medical and Care Information  Problem List   Patient Active Problem List   Diagnosis     Functional Murmur     Staring Spells (Reported)     Vitamin D deficiency     Moderate episode of recurrent major depressive disorder (H)     Sciatica     Borderline personality disorder (H)     Chronic Tension-type Headache     Anxiety Disorder Due To General Medical Condition  With Panic Attacks     Chronic Pain     Lower Back Pain     Neuralgia     Post-traumatic Stress Disorder     Latent Tuberculosis     Obsessive compulsive disorder     Calcified granuloma of lung (H)      Current Medications and Allergies:  See printed Medication Report.    Care Coordination Start Date: 6/15/2021   Frequency of Care Coordination: 6 weeks   Form Last Updated: 09/25/2021

## 2021-10-06 ENCOUNTER — PATIENT OUTREACH (OUTPATIENT)
Dept: NURSING | Facility: CLINIC | Age: 36
End: 2021-10-06

## 2021-10-11 ENCOUNTER — PATIENT OUTREACH (OUTPATIENT)
Dept: CARE COORDINATION | Facility: CLINIC | Age: 36
End: 2021-10-11

## 2021-10-11 NOTE — TELEPHONE ENCOUNTER
Clinic Care Coordination Contact  Program:  County:  County Case #:  [facility] Worker:   Claribel #:   Subscriber #:   Renewal:  Date Applied:   *Call patient during 12 pm lunch hour*  FRW Outreach:   10/12/21:    Health Insurance:      Referral/Screening:  Financial Resource Worker Screening     [facility] Benefits  Is patient requesting help applying for [facility] benefits?: Yes  Have you recently applied for any [facility] benefits?: No  How many people in your household?: 4  Do you buy/eat food together?: Yes  What is the monthly gross income for the household (wages, social security, workers comp, and pension)? : 2048     [place]:  [place] No  Is this an insurance renewal?: Yes  Is this a new insurance application request?: No        Care Coordination team will tell patient:   Thank you for answering all the questions, based on screening questions, our Financial Resource Worker will reach out to you with additional questions and next steps.     Note for FRW, please try to call patient at 12:00 PM on her lunch time due to work schedule.    Goals Addressed:

## 2021-10-12 ENCOUNTER — PATIENT OUTREACH (OUTPATIENT)
Dept: CARE COORDINATION | Facility: CLINIC | Age: 36
End: 2021-10-12

## 2021-10-12 NOTE — TELEPHONE ENCOUNTER
Clinic Care Coordination Contact  Program: Health Insurance/County Benefits   County:  [facility] Case #:  [facility] Worker:   Claribel #:   Subscriber #:   Renewal:  Date Applied:   *Call patient during 12 pm lunch hour*  FRW Outreach:   10/12/21: Outreach attempted x 1. Left message on voicemail with call back information and requested return call.  Plan: FRW will call again within one week.     Health Insurance:      Referral/Screening:  Financial Resource Worker Screening     [facility] Benefits  Is patient requesting help applying for [facility] benefits?: Yes  Have you recently applied for any [facility] benefits?: No  How many people in your household?: 4  Do you buy/eat food together?: Yes  What is the monthly gross income for the household (wages, social security, workers comp, and pension)? : 2048     Ins[place]:  [place] No  Is this an insurance renewal?: Yes  Is this a new insurance application request?: No        Care Coordination team will tell patient:   Thank you for answering all the questions, based on screening questions, our Financial Resource Worker will reach out to you with additional questions and next steps.     Note for FRW, please try to call patient at 12:00 PM on her lunch time due to work schedule.    Goals Addressed:

## 2021-10-16 ENCOUNTER — HEALTH MAINTENANCE LETTER (OUTPATIENT)
Age: 36
End: 2021-10-16

## 2021-10-18 NOTE — TELEPHONE ENCOUNTER
Clinic Care Coordination Contact  Program: Health Insurance/County Benefits   County: [place] [id number]  [facility] Case #:[phone]  [facility] Worker: Po Sanford #:   Subscriber #:   Renewal:  Date Applied:   *Call patient during 12 pm lunch hour*  FRW Outreach:   10/18/21: FRW called patient, patient received letter from county worker, Po. FRW called [facility] Worker and will send patient applications.   10/12/21: Outreach attempted x 1. Left message on voicemail with call back information and requested return call.  Plan: FRW will call again within one week.     Health Insurance:      Referral/Screening:  Financial Resource Worker Screening     [facility] Benefits  Is patient requesting help applying for [facility] benefits?: Yes  Have you recently applied for any [facility] benefits?: No  How many people in your household?: 4  Do you buy/eat food together?: Yes  What is the monthly gross income for the household (wages, social security, workers comp, and pension)? : 2048     [place]:  [place] No  Is this an insurance renewal?: Yes  Is this a new insurance application request?: No        Care Coordination team will tell patient:   Thank you for answering all the questions, based on screening questions, our Financial Resource Worker will reach out to you with additional questions and next steps.     Note for FRW, please try to call patient at 12:00 PM on her lunch time due to work schedule.    Goals Addressed:

## 2021-10-29 ENCOUNTER — PATIENT OUTREACH (OUTPATIENT)
Dept: CARE COORDINATION | Facility: CLINIC | Age: 36
End: 2021-10-29

## 2021-10-29 NOTE — PROGRESS NOTES
Clinic Care Coordination Contact  Program: Health Insurance/County Benefits   County: [place] [id number]  [facility] Case #:[phone]  [facility] Worker: Po Sanford #:   Subscriber #:   Renewal:  Date Applied:   *Call patient during 12 pm lunch hour*      FRW Outreach:  10/29/2021: FRW tried calling patient and was unable to get a Noemi  on the line. FRW will make outreach in 1 week.   10/18/21: FRW called patient, patient received letter from county worker, Po. FRW called [facility] Worker and will send patient applications.   10/12/21: Outreach attempted x 1. Left message on [employer] with call back information and requested return call.  Plan: FRW will call again within one week.     Health Insurance:      Referral/Screening:  Financial Resource Worker Screening     [facility] Benefits  Is patient requesting help applying for [facility] benefits?: Yes  Have you recently applied for any [facility] benefits?: No  How many people in your household?: 4  Do you buy/eat food together?: Yes  What is the monthly gross income for the household (wages, social security, workers comp, and pension)? : 2048     [place]:  [place] No  Is this an insurance renewal?: Yes  Is this a new insurance application request?: No        Care Coordination team will tell patient:   Thank you for answering all the questions, based on screening questions, our Financial Resource Worker will reach out to you with additional questions and next steps.     Note for FRW, please try to call patient at 12:00 PM on her lunch time due to work schedule.    Goals Addressed:

## 2021-11-02 ENCOUNTER — OFFICE VISIT (OUTPATIENT)
Dept: FAMILY MEDICINE | Facility: CLINIC | Age: 36
End: 2021-11-02
Payer: COMMERCIAL

## 2021-11-02 ENCOUNTER — PATIENT OUTREACH (OUTPATIENT)
Dept: NURSING | Facility: CLINIC | Age: 36
End: 2021-11-02

## 2021-11-02 VITALS
HEIGHT: 61 IN | BODY MASS INDEX: 35.3 KG/M2 | OXYGEN SATURATION: 98 % | DIASTOLIC BLOOD PRESSURE: 76 MMHG | WEIGHT: 187 LBS | SYSTOLIC BLOOD PRESSURE: 96 MMHG | TEMPERATURE: 99.2 F | HEART RATE: 75 BPM

## 2021-11-02 DIAGNOSIS — Z00.00 HEALTHY ADULT ON ROUTINE PHYSICAL EXAMINATION: ICD-10-CM

## 2021-11-02 DIAGNOSIS — E66.3 OVERWEIGHT: Primary | ICD-10-CM

## 2021-11-02 DIAGNOSIS — Z13.1 SCREENING FOR DIABETES MELLITUS: ICD-10-CM

## 2021-11-02 DIAGNOSIS — F43.10 POSTTRAUMATIC STRESS DISORDER: ICD-10-CM

## 2021-11-02 DIAGNOSIS — E56.9 VITAMIN DEFICIENCY: ICD-10-CM

## 2021-11-02 DIAGNOSIS — R20.2 PARESTHESIA: ICD-10-CM

## 2021-11-02 DIAGNOSIS — H53.9 VISION CHANGES: ICD-10-CM

## 2021-11-02 LAB
ALBUMIN SERPL-MCNC: 3.8 G/DL (ref 3.5–5)
ALP SERPL-CCNC: 54 U/L (ref 45–120)
ALT SERPL W P-5'-P-CCNC: 22 U/L (ref 0–45)
ANION GAP SERPL CALCULATED.3IONS-SCNC: 10 MMOL/L (ref 5–18)
AST SERPL W P-5'-P-CCNC: 27 U/L (ref 0–40)
BILIRUB SERPL-MCNC: 0.2 MG/DL (ref 0–1)
BUN SERPL-MCNC: 14 MG/DL (ref 8–22)
CALCIUM SERPL-MCNC: 9.3 MG/DL (ref 8.5–10.5)
CHLORIDE BLD-SCNC: 103 MMOL/L (ref 98–107)
CO2 SERPL-SCNC: 24 MMOL/L (ref 22–31)
CREAT SERPL-MCNC: 0.72 MG/DL (ref 0.6–1.1)
GFR SERPL CREATININE-BSD FRML MDRD: >90 ML/MIN/1.73M2
GLUCOSE BLD-MCNC: 88 MG/DL (ref 70–125)
HBA1C MFR BLD: 5.3 % (ref 0–5.6)
POTASSIUM BLD-SCNC: 4.1 MMOL/L (ref 3.5–5)
PROT SERPL-MCNC: 7.6 G/DL (ref 6–8)
SODIUM SERPL-SCNC: 137 MMOL/L (ref 136–145)
TSH SERPL DL<=0.005 MIU/L-ACNC: 1.58 UIU/ML (ref 0.3–5)
VIT B12 SERPL-MCNC: 809 PG/ML (ref 213–816)

## 2021-11-02 PROCEDURE — 36415 COLL VENOUS BLD VENIPUNCTURE: CPT | Performed by: FAMILY MEDICINE

## 2021-11-02 PROCEDURE — 83036 HEMOGLOBIN GLYCOSYLATED A1C: CPT | Performed by: FAMILY MEDICINE

## 2021-11-02 PROCEDURE — 99395 PREV VISIT EST AGE 18-39: CPT | Performed by: FAMILY MEDICINE

## 2021-11-02 PROCEDURE — 84443 ASSAY THYROID STIM HORMONE: CPT | Performed by: FAMILY MEDICINE

## 2021-11-02 PROCEDURE — 82607 VITAMIN B-12: CPT | Performed by: FAMILY MEDICINE

## 2021-11-02 PROCEDURE — 80053 COMPREHEN METABOLIC PANEL: CPT | Performed by: FAMILY MEDICINE

## 2021-11-02 PROCEDURE — 82306 VITAMIN D 25 HYDROXY: CPT | Performed by: FAMILY MEDICINE

## 2021-11-02 PROCEDURE — 86780 TREPONEMA PALLIDUM: CPT | Performed by: FAMILY MEDICINE

## 2021-11-02 RX ORDER — ACETAMINOPHEN 325 MG/1
325-650 TABLET ORAL EVERY 6 HOURS PRN
Qty: 90 TABLET | Refills: 3 | Status: SHIPPED | OUTPATIENT
Start: 2021-11-02 | End: 2023-03-30

## 2021-11-02 ASSESSMENT — PATIENT HEALTH QUESTIONNAIRE - PHQ9
10. IF YOU CHECKED OFF ANY PROBLEMS, HOW DIFFICULT HAVE THESE PROBLEMS MADE IT FOR YOU TO DO YOUR WORK, TAKE CARE OF THINGS AT HOME, OR GET ALONG WITH OTHER PEOPLE: SOMEWHAT DIFFICULT
SUM OF ALL RESPONSES TO PHQ QUESTIONS 1-9: 10
SUM OF ALL RESPONSES TO PHQ QUESTIONS 1-9: 10

## 2021-11-02 ASSESSMENT — MIFFLIN-ST. JEOR: SCORE: 1475.61

## 2021-11-02 NOTE — PROGRESS NOTES
"   SUBJECTIVE:   CC: Najma Rodriguez is an 36 year old woman who presents for preventive health visit    She is doing well she is eating well . She is walking a lot at work.  She does not have sex with anyone. She has a regular menses.  She is busy with her kids.  She is worried because she is dating someone and she kissed him and she is worried about diseases.  She also noted that the person she was dating was doing a lot of drinking, so she stopped the relationship.  She is so proud of her kids and their accomplishments.    {she has gotten her flu shot.    Next pap smear is due in 2025.      Ros:  Positive for pain in her   Occasional palpitations with doing very heavy work.  It improves if she lays down right away.  When this happens, she has dizziness and blurry vision . If she drinks a sweet drink it goes away.  Her palpitatios also happened when her supervisor was \"mean\" to her.    She found a new job after so many problems with her old boss.  She is now working with kids as a .        Labs from 7/2021 showed a slightly high blood sugar.      Patient has been advised of split billing requirements and indicates understanding: Yes  Healthy Habits:     Getting at least 3 servings of Calcium per day:  NO    Bi-annual eye exam:  NO    Dental care twice a year:  NO    Sleep apnea or symptoms of sleep apnea:  None    Diet:  Regular (no restrictions)    Frequency of exercise:  None    Taking medications regularly:  Yes    Barriers to taking medications:  None    Medication side effects:  None    PHQ-2 Total Score: 4    Additional concerns today:  No  Answers for HPI/ROS submitted by the patient on 11/2/2021  If you checked off any problems, how difficult have these problems made it for you to do your work, take care of things at home, or get along with other people?: Somewhat difficult  PHQ9 TOTAL SCORE: 10                Today's PHQ-2 Score:   PHQ-2 ( 1999 [employer]) 11/2/2021   Q1: Little interest or " pleasure in doing things 2   Q2: Feeling down, depressed or hopeless 2   PHQ-2 Score 4   Q1: Little interest or pleasure in doing things More than half the days   Q2: Feeling down, depressed or hopeless More than half the days   PHQ-2 Score 4       Abuse: Current or Past (Physical, Sexual or Emotional) - No  Do you feel safe in your environment? Yes    Have you ever done Advance Care Planning? (For example, a Health Directive, POLST, or a discussion with a medical provider or your loved ones about your wishes): No, advance care planning information given to patient to review.  Advanced care planning was discussed at today's visit.  She is concerned that she does not have any family other than her kids.      Social History     Tobacco Use     Smoking status: Never Smoker     Smokeless tobacco: Never Used     Tobacco comment: no passive exposure   Substance Use Topics     Alcohol use: No         Alcohol Use 11/2/2021   Prescreen: >3 drinks/day or >7 drinks/week? No       Reviewed orders with patient.  Reviewed health maintenance and updated orders accordingly - Yes      Breast Cancer Screening:  Any new diagnosis of family breast, ovarian, or bowel cancer? No    FHS-7: No flowsheet data found.      Pertinent mammograms are reviewed under the imaging tab.    History of abnormal Pap smear:   PAP / HPV Latest Ref Rng & Units 7/20/2020 7/24/2015   PAP Negative for squamous intraepithelial lesion or malignancy. Negative for squamous intraepithelial lesion or malignancy  Electronically signed by Lidya Ernandez CT (ASCP) on 7/29/2020 at 10:40 AM   Negative for squamous intraepithelial lesion or malignancy  Electronically signed by Teri Marques CT (ASCP) on 7/31/2015 at  1:00 PM     HPV16 NEG Negative -   HPV18 NEG Negative -   HRHPV NEG Negative -     Reviewed and updated as needed this visit by clinical staff  Tobacco  Allergies  Meds              Reviewed and updated as needed this visit by Provider           "      Past Medical History:   Diagnosis Date     Anxiety disorder      Borderline personality disorder (H)      Chronic tension type headache      Depression      Functional murmur      Neuralgia      OCD (obsessive compulsive disorder)      Post traumatic stress disorder (PTSD)      Sciatica      Vitamin D deficiency       Past Surgical History:   Procedure Laterality Date     C  DELIVERY ONLY      Description:  Section Low Transverse;  Recorded: 2010;  Comments: for failure to progress.  macrosomic infant.     OB History    Para Term  AB Living   3 3 3 0 0 3   SAB TAB Ectopic Multiple Live Births   0 0 0 0 0      # Outcome Date GA Lbr Carter/2nd Weight Sex Delivery Anes PTL Lv   3 Term            2 Term            1 Term               Obstetric Comments   2 children born in [place], 1 child born in [place]       Review of Systems  Complete ros is otherewise negative.    Has occasional swelling and pain in her right lateral ankle that she rolled many years ago.    Some numbness in her legs when she takes a nap in the day.  No other numbness.    Cant see well, but received glasses and they gave her headaches.  Wants to go back to see  eye doctor again.       OBJECTIVE:   BP 96/76 (BP Location: Left arm, Patient Position: Sitting, Cuff Size: Adult Regular)   Pulse 75   Temp 99.2  F (37.3  C) (Oral)   Ht 1.549 m (5' 1\")   Wt 84.8 kg (187 lb)   LMP 10/23/2021   SpO2 98%   BMI 35.33 kg/m    Physical Exam  General Appearance: Alert, cooperative, no distress, appears stated age  Head: Normocephalic, without obvious abnormality, atraumatic  Eyes: PERRL, conjunctiva/corneas clear, EOM's intact  Ears: Normal TM's and external ear canals, both ears  Nose: Nares normal, septum midline,mucosa normal, no drainage  Throat: Lips, mucosa, and tongue normal; teeth and gums normal  Neck: Supple, symmetrical, trachea midline, no adenopathy;  thyroid: not enlarged, symmetric, no " tenderness/mass/nodules; no carotid bruit or JVD  Back: Symmetric, no curvature, ROM normal, no CVA tenderness  Lungs: Clear to auscultation bilaterally, respirations unlabored  Breasts: No breast masses, tenderness, asymmetry, or nipple discharge.  Heart: Regular rate and rhythm, S1 and S2 normal, no murmur, rub, or gallop,   Abdomen: Soft, non-tender, bowel sounds active all four quadrants,  no masses, no organomegaly.  Extremities: Extremities normal, atraumatic, no cyanosis or edema.  Very minimal swelling over right ankle.  No erythema or warmth  Full rom .   Skin: Skin color, texture, turgor normal, no rashes or lesions.   noted.   Lymph nodes: Cervical, supraclavicular, and axillary nodes normal  Neurologic: Normal.  Sensation intact over bilateral feet.  Normal hair growth . Normal pulses.        Diagnostic Test Results:  Labs reviewed in Epic  No results found for this or any previous visit (from the past 24 hour(s)).    ASSESSMENT/PLAN:   1. Overweight  Encouraged healthy diet and exercise.   - Hemoglobin A1c; Future  - Hemoglobin A1c    2. Healthy adult on routine physical examination  Need pap in 2 years.    Reviewed std prevention.    - Hemoglobin A1c; Future  - acetaminophen (TYLENOL) 325 MG tablet; Take 1-2 tablets (325-650 mg) by mouth every 6 hours as needed for mild pain  Dispense: 90 tablet; Refill: 3  - Hemoglobin A1c    3. Posttraumatic stress disorder  Reviewed that she might be very sensitive to loud noises and conflict.    - Hemoglobin A1c; Future  - Hemoglobin A1c    4. Screening for diabetes mellitus    - Hemoglobin A1c; Future  - Comprehensive metabolic panel (BMP + Alb, Alk Phos, ALT, AST, Total. Bili, TP); Future  - Treponema Abs w Reflex to RPR and Titer; Future  - Hemoglobin A1c  - Comprehensive metabolic panel (BMP + Alb, Alk Phos, ALT, AST, Total. Bili, TP)  - Treponema Abs w Reflex to RPR and Titer    5. Paresthesia    - TSH with free T4 reflex; Future  - Vitamin B12; Future  -  "Vitamin D Deficiency; Future  - Comprehensive metabolic panel (BMP + Alb, Alk Phos, ALT, AST, Total. Bili, TP); Future  - Treponema Abs w Reflex to RPR and Titer; Future  - TSH with free T4 reflex  - Vitamin B12  - Vitamin D Deficiency  - Comprehensive metabolic panel (BMP + Alb, Alk Phos, ALT, AST, Total. Bili, TP)  - Treponema Abs w Reflex to RPR and Titer    6. Vitamin deficiency    - cholecalciferol 125 MCG (5000 UT) CAPS; [CHOLECALCIFEROL, VITAMIN D3, 125 MCG (5,000 UNIT) CAPSULE] TAKE 1 CAPSULE BY MOUTH DAILY  Dispense: 90 capsule; Refill: 3    7. Vision changes    - Adult Eye Referral; Future      Patient has been advised of split billing requirements and indicates understanding: Yes  COUNSELING:  Reviewed preventive health counseling, as reflected in patient instructions       Regular exercise       Healthy diet/nutrition       Vision screening       Safe sex practices/STD prevention       Advance Care Planning    Estimated body mass index is 35.33 kg/m  as calculated from the following:    Height as of this encounter: 1.549 m (5' 1\").    Weight as of this encounter: 84.8 kg (187 lb).    Weight management plan: Discussed healthy diet and exercise guidelines    She reports that she has never smoked. She has never used smokeless tobacco.      Counseling Resources:  ATP IV Guidelines  Pooled Cohorts Equation Calculator  Breast Cancer Risk Calculator  BRCA-Related Cancer Risk Assessment: FHS-7 Tool  FRAX Risk Assessment  ICSI Preventive Guidelines  Dietary Guidelines for Americans, 2010  USDA's MyPlate  ASA Prophylaxis  Lung CA Screening    Danielle Nair MD  [facility]  "

## 2021-11-02 NOTE — PROGRESS NOTES
Clinic Care Coordination Contact    Community Health Worker Follow Up    Care Gaps:   Health Maintenance Due   Topic Date Due     PREVENTIVE CARE VISIT  Never done     ADVANCE CARE PLANNING  Never done     Pneumococcal Vaccine: Pediatrics (0 to 5 Years) and At-Risk Patients (6 to 64 Years) (1 of 2 - PPSV23) Never done     INFLUENZA VACCINE (1) 09/01/2021     Patient will attend her physical appointment today at 5:30 PM.  Goals:   Goals Addressed as of 11/2/2021 at 5:03 PM                    Today    10/6/21       Financial Wellbeing (pt-stated)   20%  10%    Added 10/6/21 by Papo Brunner      Goal statement: I would like to find out if I am eligible for [facility] benefits such as SNAP and  within the next 60 days.     Date goal set: 10/05/2021  Barriers: English as a second language, financial concerns, difficult to reach via phone  Strengths: Engaged with CCC team when reachable  Date to achieve by: 12/06/21  Patient expressed understanding of goal: Yes     Action steps to achieve this goal:  1.  I only need SNAP and energy assistance at this time.   2.  I will answer my phone when FRW calls.  3.  I will provide all required documents when needed.     Note: Patient has not received application that FRW mentioned to patient at previous call with patient.    Goal update: 11/02/2021        Intervention and Education during outreach:  -CHW was able to reach today at 5 PM and reminded patient of the appointment and patient is coming.  -CHW reminded patient to bring all medication bottles for PCP to review them.  -CHW reviewed FRW notes on 10/29/2021 and informed patient that FRW will call patient again within a week to assist with [facility] benefits.  -[place] is no longer interested in [facility] services and stated that she won't be able to answer her phone due to her work schedule.  -CHW informed patient to call in the future if interest in [facility] services.  -CHW informed to call for questions or concerns.      CHW  Next Outreach: In one month.

## 2021-11-03 LAB
DEPRECATED CALCIDIOL+CALCIFEROL SERPL-MC: 52 UG/L (ref 30–80)
T PALLIDUM AB SER QL: NONREACTIVE

## 2021-11-05 ENCOUNTER — PATIENT OUTREACH (OUTPATIENT)
Dept: CARE COORDINATION | Facility: CLINIC | Age: 36
End: 2021-11-05

## 2021-11-05 NOTE — PROGRESS NOTES
Clinic Care Coordination Contact  Program: [facility]   County: [facility] Case #: [id number]  County Worker: Po [phone]  Mnsure #:   Subscriber #: [id number]  Renewal:  Date Applied:     *Call patient during 12 pm lunch hour*    FRW Outreach:  11/5/2021: FRW checked [place] and found active [place]. FRW called patient and was unable to leave a vm as the mailbox was full. FRW tried patient again on her lunch hour. Patient states she received a letter in the mail but it was only regarding health insurance. We called the [facility] EZ info line and SNAP was denied 8/25/2021. She states she submitted another application in Oct, We called Po and left a vm with call back information requesting the status of her case. FRW will make outreach in 1 week.   10/29/2021: FRW tried calling patient and was unable to get a Noemi  on the line. FRW will make outreach in 1 week.   10/18/21: FRW called patient, patient received letter from [facility] workerPo. FRW called [employer] Worker and will send patient applications.   10/12/21: Outreach attempted x 1. Left message on voicemail with call back information and requested return call.  Plan: FRW will call again within one week.     Health Insurance:    Major Programs  This subscriber has eligibility for MA: Medical Assistance.  Elig Type 16: 1619B  Eligibility Begin Date: 10/01/2017  Eligibility End Date: --/--/----  This subscriber is eligible for the following service types: Medical Care ,  Chiropractic ,  Dental Care ,  Hospital ,  Hospital - Inpatient ,  Hospital - Outpatient ,  Emergency Services ,  Pharmacy ,  Professional (Physician) Visit - Office ,  Vision (Optometry) ,  Mental Health ,  Urgent Care  Prepaid Health Plan  This subscriber receives MA37 - Special Needs BasicCare (SNBC) delivered through [facility]. The phone numbers are: [phone] (metro) or [phone] (toll free).  SNBC does not cover personal care assistant (PCA) services,  private duty nursing (PDN), or home and community based waiver services. SNBC recipients can obtain these services through [facility]service [facility].  If the subscriber has Medicare, Medicare services must be submitted to original Medicare FFS or the selected Medicare Part D plan prior to submitting to the health plan.    Referral/Screening:  Financial Resource Worker Screening     County Benefits  Is patient requesting help applying for county benefits?: Yes  Have you recently applied for any county benefits?: No  How many people in your household?: 4  Do you buy/eat food together?: Yes  What is the monthly gross income for the household (wages, social security, workers comp, and pension)? : 2048     Insurance:  [place] insurance[place] No  Is this an insurance renewal?: Yes  Is this a new insurance application request?: No        Care Coordination team will tell patient:   Thank you for answering all the questions, based on screening questions, our Financial Resource Worker will reach out to you with additional questions and next steps.     Note for FRW, please try to call patient at 12:00 PM on her lunch time due to work schedule.    Goals Addressed:

## 2021-11-12 ENCOUNTER — PATIENT OUTREACH (OUTPATIENT)
Dept: CARE COORDINATION | Facility: CLINIC | Age: 36
End: 2021-11-12
Payer: COMMERCIAL

## 2021-11-12 NOTE — PROGRESS NOTES
Clinic Care Coordination Contact  Program: [facility]: [facility] Case #: [id number]  County Worker: Po [phone]  Mnsure #:   Subscriber #: [id number]  Renewal:  Date Applied:     *Call patient during 12 pm lunch hour*    FRW Outreach:  11/12/2021: FRW called patient and was unable to leave a vm as the mailbox was full. FRW will make outreach in 1-3 weeks.   11/5/2021: FRW checked [place] and found active [place]. FRW called patient and was unable to leave a vm as the mailbox was full. FRW tried patient again on her lunch hour. Patient states she received a letter in the mail but it was only regarding health insurance. We called the [facility] EZ info line and SNAP was denied 8/25/2021. She states she submitted another application in Oct, We called Po and left a vm with call back information requesting the status of her case. FRW will make outreach in 1 week.   10/29/2021: FRW tried calling patient and was unable to get a Noemi  on the line. FRW will make outreach in 1 week.   10/18/21: FRW called patient, patient received letter from [facility] worker, Po. FRW called [facility] Worker and will send patient applications.   10/12/21: Outreach attempted x 1. Left message on voicemail with call back information and requested return call.  Plan: FRW will call again within one week.     Health Insurance:    Major Programs  This subscriber has eligibility for MA: Medical Assistance.  Elig Type 16: 1619B  Eligibility Begin Date: 10/01/2017  Eligibility End Date: --/--/----  This subscriber is eligible for the following service types: Medical Care ,  Chiropractic ,  Dental Care ,  Hospital ,  Hospital - Inpatient ,  Hospital - Outpatient ,  Emergency Services ,  Pharmacy ,  Professional (Physician) Visit - Office ,  Vision (Optometry) ,  Mental Health ,  Urgent Care  Prepaid Health Plan  This subscriber receives MA37 - Special Needs BasicCare (SNBC) delivered through [facility]. The phone  numbers are: [phone] (metro) or [phone] (toll free).  [facility] does not cover personal care assistant (PCA) services, private duty nursing (PDN), or home and community based waiver services. SNBC recipients can obtain these services through [facility]-service [facility].  If the subscriber has Medicare, Medicare services must be submitted to original Medicare FFS or the selected Medicare Part D plan prior to submitting to the health plan.    Referral/Screening:  Financial Resource Worker Screening     County Benefits  Is patient requesting help applying for [facility] benefits?: Yes  Have you recently applied for any county benefits?: No  How many people in your household?: 4  Do you buy/eat food together?: Yes  What is the monthly gross income for the household (wages, social security, workers comp, and pension)? : 2048     Insurance:  [place] insurance?: No  Is this an insurance renewal?: Yes  Is this a new insurance application request?: No        Care Coordination team will tell patient:   Thank you for answering all the questions, based on screening questions, our Financial Resource Worker will reach out to you with additional questions and next steps.     Note for FRW, please try to call patient at 12:00 PM on her lunch time due to work schedule.    Goals Addressed:

## 2021-11-18 ENCOUNTER — PATIENT OUTREACH (OUTPATIENT)
Dept: CARE COORDINATION | Facility: CLINIC | Age: 36
End: 2021-11-18
Payer: COMMERCIAL

## 2021-11-18 NOTE — PROGRESS NOTES
Clinic Care Coordination Contact    Situation: 45 day  chart reviewed by SW/care coordinator.    Background:    Most recent SW/CC assessment completed on 6-15-21.  Most recent complex care plan generated 9-25-21.  Per chart review, pt has continued to follow plan of care, is actively engaged with Care Coordination Team and is making progress toward goal completion.  No ED or hospital visits in the last 45 days.  Last outreach by CHW was 11-2-21 and last outreach by FRW was 11-5-21.  CHW is reaching out to pt monthly.    Assessment:  SW/CC reviewed appropriate parts of pt's medical record.  Chart to be reviewed by SW/CC every 45 days.  Complex Care Plan to be generated every 90 days while pt in enrolled to Care Coordination.      Plan/Recommendations:  1.) Continue scheduled outreach    PATY Trejo/SILVESTRE, for PATY Heath/CC

## 2021-11-29 ENCOUNTER — PATIENT OUTREACH (OUTPATIENT)
Dept: CARE COORDINATION | Facility: CLINIC | Age: 36
End: 2021-11-29
Payer: COMMERCIAL

## 2021-11-29 NOTE — TELEPHONE ENCOUNTER
Clinic Care Coordination Contact  Program: [facility]   County: [facility] Case #: [id number]  County Worker: Po [phone]  Mnsure #:   Subscriber #: [id number]  Renewal:  Date Applied:     *Call patient during 12 pm lunch hour*    FRW Outreach:  11/29/2021: FRW called patient during lunch hour and left VM. FRW will make last attempt next week.   11/12/2021: FRW called patient and was unable to leave a vm as the mailbox was full. FRW will make outreach in 1-3 weeks.   11/5/2021: FRW checked [place] and found active [place]. FRW called patient and was unable to leave a vm as the mailbox was full. FRW tried patient again on her lunch hour. Patient states she received a letter in the mail but it was only regarding health insurance. We called the [facility] EZ info line and SNAP was denied 8/25/2021. She states she submitted another application in Oct, We called Po and left a vm with call back information requesting the status of her case. FRW will make outreach in 1 week.   10/29/2021: FRW tried calling patient and was unable to get a Noemi  on the line. FRW will make outreach in 1 week.   10/18/21: FRW called patient, patient received letter from [employer] workerPo. FRW called [employer] Worker and will send patient applications.   10/12/21: Outreach attempted x 1. Left message on voicemail with call back information and requested return call.  Plan: FRW will call again within one week.     Health Insurance:    Major Programs  This subscriber has eligibility for MA: Medical Assistance.  Elig Type 16: 1619B  Eligibility Begin Date: 10/01/2017  Eligibility End Date: --/--/----  This subscriber is eligible for the following service types: Medical Care ,  Chiropractic ,  Dental Care ,  Hospital ,  Hospital - Inpatient ,  Hospital - Outpatient ,  Emergency Services ,  Pharmacy ,  Professional (Physician) Visit - Office ,  Vision (Optometry) ,  Mental Health ,  Urgent Care  Prepaid Health Plan  This  subscriber receives MA37 - Special Needs BasicCare (SNBC) delivered through [facility]. The phone numbers are: [phone] (metro) or [phone] (toll free).  SNBC does not cover personal care assistant (PCA) services, private duty nursing (PDN), or home and community based waiver services. SNBC recipients can obtain these services through [facility].  If the subscriber has Medicare, Medicare services must be submitted to original Medicare FFS or the selected Medicare Part D plan prior to submitting to the health plan.    Referral/Screening:  Financial Resource Worker Screening     [facility] Benefits  Is patient requesting help applying for [facility] benefits?: Yes  Have you recently applied for any [facility] benefits?: No  How many people in your household?: 4  Do you buy/eat food together?: Yes  What is the monthly gross income for the household (wages, social security, workers comp, and pension)? : 2048     Insurance:  [place]?: No  Is this an insurance renewal?: Yes  Is this a new insurance application request?: No        Care Coordination team will tell patient:   Thank you for answering all the questions, based on screening questions, our Financial Resource Worker will reach out to you with additional questions and next steps.     Note for FRW, please try to call patient at 12:00 PM on her lunch time due to work schedule.    Goals Addressed:

## 2021-12-06 ENCOUNTER — PATIENT OUTREACH (OUTPATIENT)
Dept: CARE COORDINATION | Facility: CLINIC | Age: 36
End: 2021-12-06
Payer: COMMERCIAL

## 2021-12-06 NOTE — TELEPHONE ENCOUNTER
Clinic Care Coordination Contact  Program: [facility]   County: [facility] Case #: [id number]  County Worker: Po [phone]  Mnsure #:   Subscriber #: [id number]  Renewal:  Date Applied:     *Call patient during 12 pm lunch hour*    FRW Outreach:  12/6/2021: FRW called patient one last time during lunch hour and left VM. Please have patient follow up with [facility] for [facility] questions. [facility] # [phone]. Routing note to CC team.   11/29/2021: FRW called patient during lunch hour and left VM. FRW will make last attempt next week.   11/12/2021: FRW called patient and was unable to leave a vm as the mailbox was full. FRW will make outreach in 1-3 weeks.   11/5/2021: FRW [place] and [place]. FRW called patient and was unable to leave a vm as the mailbox was full. FRW tried patient again on her lunch hour. Patient states she received a letter in the mail but it was only regarding health insurance. We called the [facility] EZ info line and SNAP was denied 8/25/2021. She states she submitted another application in Oct, We called Po and left a vm with call back information requesting the status of her case. FRW will make outreach in 1 week.   10/29/2021: FRW tried calling patient and was unable to get a Noemi  on the line. FRW will make outreach in 1 week.   10/18/21: FRW called patient, patient received letter from [facility] worker, Po. FRW called [facility] Worker and will send patient applications.   10/12/21: Outreach attempted x 1. Left message on voicemail with call back information and requested return call.  Plan: FRW will call again within one week.     Health Insurance:    Major Programs  This subscriber has eligibility for MA: Medical Assistance.  Elig Type 16: 1619B  Eligibility Begin Date: 10/01/2017  Eligibility End Date: --/--/----  This subscriber is eligible for the following service types: Medical Care ,  Chiropractic ,  Dental Care ,  Hospital ,   Hospital - Inpatient ,  Hospital - Outpatient ,  Emergency Services ,  Pharmacy ,  Professional (Physician) Visit - Office ,  Vision (Optometry) ,  Mental Health ,  Urgent Care  Prepaid Health Plan  This subscriber receives MA37 - Special Needs BasicCare (SNBC) delivered through [facility]. The phone numbers are: [phone] (metro) or [phone] (toll free).  SNBC does not cover personal care assistant (PCA) services, private duty nursing (PDN), or home and community based waiver services. SNBC recipients can obtain these services through [facility].  If the subscriber has Medicare, Medicare services must be submitted to original Medicare FFS or the selected Medicare Part D plan prior to submitting to the health plan.    Referral/Screening:  Financial Resource Worker Screening     [facility] Benefits  Is patient requesting help applying for [facility] benefits?: Yes  Have you recently applied for any [facility] benefits?: No  How many people in your household?: 4  Do you buy/eat food together?: Yes  What is the monthly gross income for the household (wages, social security, workers comp, and pension)? : 2048     [place]:  [place] No  Is this an insurance renewal?: Yes  Is this a new insurance application request?: No        Care Coordination team will tell patient:   Thank you for answering all the questions, based on screening questions, our Financial Resource Worker will reach out to you with additional questions and next steps.     Note for FRW, please try to call patient at 12:00 PM on her lunch time due to work schedule.    Goals Addressed:

## 2021-12-13 ENCOUNTER — PATIENT OUTREACH (OUTPATIENT)
Dept: NURSING | Facility: CLINIC | Age: 36
End: 2021-12-13
Payer: COMMERCIAL

## 2021-12-13 NOTE — PROGRESS NOTES
Clinic Care Coordination Contact    Community Health Worker Follow Up    Care Gaps:   Health Maintenance Due   Topic Date Due     Pneumococcal Vaccine: Pediatrics (0 to 5 Years) and At-Risk Patients (6 to 64 Years) (1 of 2 - PPSV23) Never done     INFLUENZA VACCINE (1) 09/01/2021     Care Gaps Last addressed on 11/02/2021  Goals:   Goals Addressed as of 12/13/2021 at 5:17 PM        1. Health Eating. (pt-stated)     Added 12/13/21 by Papo Brunner      Goal statement: I would like to find out if I am eligible for [facility] benefits such as SNAP and  within the next 60 days.     Date goal set: 12/13/2021.  Barriers: English as a second language, financial concerns, difficult to reach via phone  Strengths: Engaged with CCC team when reachable  Date to achieve by: 02/13/2022  Patient expressed understanding of goal: Yes     Action steps to achieve this goal:  1.  I will answer my phone when FRW calls me at my lunch time.    2.  I will submit all required documents to [facility] when needed.   3.  I will communicate with CHW at outreach.         Intervention and Education during outreach:  -CHW conducted a new FRW screening for FRW to assist patient with [facility] benefits, SNAP and .  -Patient stated that she submitted SNAP application couple days ago and she will needs help for follow up with [facility].   -Patient stated she's available for call at her lunch time around 11:30 AM to 12:00 PM and can extend time when needed.  -Patient stated that [facility] was going to call her for her SNAP interview so she took a day off and never received call from the [facility].  -Patient stated she received letter from the [facility] which stated that her case was closed due to patient did not answer her phone interview.   -Patient stated she does not have any money for foods at home and her children well eating and she needs more food at home for her children.  -CHW provided direct contact to patient again to call  if patient is not connected with [facility] for benefits.      CHW Next Outreach: in one month.       Financial Resource Worker Screening    [facility] Benefits  Is patient requesting help applying for [facility] benefits?: Yes  Have you recently applied for any [facility] benefits?: Yes  What was applied for? : SNAP,Energy Assistance,CASH  Application date:: 12/10/2021  How many people in your household?: 4  Do you buy/eat food together?: Yes  What is the monthly gross income for the household (wages, social security, workers comp, and pension)? : 1600    Insurance:  [place]?: No  Is this an insurance renewal?: No  Is this a new insurance application request?: No    Any other information for the FRW?: Patient is available to receive calls around 11:30 AM to 12:00 PM and can extend time if needed.    Care Coordination team will tell patient:   Thank you for answering all the questions, based on screening questions, our Financial Resource Worker will reach out to you with additional questions and next steps.

## 2021-12-14 ENCOUNTER — PATIENT OUTREACH (OUTPATIENT)
Dept: CARE COORDINATION | Facility: CLINIC | Age: 36
End: 2021-12-14
Payer: COMMERCIAL

## 2021-12-14 NOTE — TELEPHONE ENCOUNTER
Clinic Care Coordination Contact  Program: [facility]: [facility] Case #: [id number]  County Worker: Po [phone]  Mnsure #:   Subscriber #: [id number]  Renewal:  Date Applied:     *Call patient during 12 pm lunch hour*    FRW Outreach:  12/28/2021: Outreach attempted x 1. Left message on voicemail with call back information and requested return call.  Plan: FRW will call again within one week.   12/14/2021: Patient re-referred for [facility]  12/6/2021: FRW called patient one last time during lunch hour and left VM. Please have patient follow up with [facility] for [facility] questions. [facility] # [phone]. Routing note to CC team.   11/29/2021: FRW called patient during lunch hour and left VM. FRW will make last attempt next week.   11/12/2021: FRW called patient and was unable to leave a vm as the mailbox was full. FRW will make outreach in 1-3 weeks.   11/5/2021: FRW [place] and [place]. FRW called patient and was unable to leave a vm as the mailbox was full. FRW tried patient again on her lunch hour. Patient states she received a letter in the mail but it was only regarding health insurance. We called the [facility] EZ info line and SNAP was denied 8/25/2021. She states she submitted another application in Oct, We called Po and left a vm with call back information requesting the status of her case. FRW will make outreach in 1 week.   10/29/2021: FRW tried calling patient and was unable to get a Noemi  on the line. FRW will make outreach in 1 week.   10/18/21: FRW called patient, patient received letter from [facility] worker, Po. FRW called [facility] Worker and will send patient applications.   10/12/21: Outreach attempted x 1. Left message on voicemail with call back information and requested return call.  Plan: FRW will call again within one week.     Health Insurance:    Major Programs  This subscriber has eligibility for MA: Medical  Darrell.  Arun Type 16: 1619B  Eligibility Begin Date: 10/01/2017  Eligibility End Date: --/--/----  This subscriber is eligible for the following service types: Medical Care ,  Chiropractic ,  Dental Care ,  Hospital ,  Hospital - Inpatient ,  Hospital - Outpatient ,  Emergency Services ,  Pharmacy ,  Professional (Physician) Visit - Office ,  Vision (Optometry) ,  Mental Health ,  Urgent Care  Prepaid Health Plan  This subscriber receives MA37 - Special Needs BasicCare (SNBC) delivered through [facility]. The phone numbers are: [phone] (metro) or [phone] (toll free).  SNBC does not cover personal care assistant (PCA) services, private duty nursing (PDN), or home and community based waiver services. SNBC recipients can obtain these services through fee-for-service [facility].  If the subscriber has Medicare, Medicare services must be submitted to original Medicare FFS or the selected Medicare Part D plan prior to submitting to the health plan.    Referral/Screening:  Financial Resource Worker Screening     County Benefits  Is patient requesting help applying for [facility] benefits?: Yes  Have you recently applied for any county benefits?: No  How many people in your household?: 4  Do you buy/eat food together?: Yes  What is the monthly gross income for the household (wages, social security, workers comp, and pension)? : 2048     Insurance:  [place] No  Is this an insurance renewal?: Yes  Is this a new insurance application request?: No        Care Coordination team will tell patient:   Thank you for answering all the questions, based on screening questions, our Financial Resource Worker will reach out to you with additional questions and next steps.     Note for FRW, please try to call patient at 12:00 PM on her lunch time due to work schedule.    Goals Addressed:

## 2021-12-30 ENCOUNTER — PATIENT OUTREACH (OUTPATIENT)
Dept: CARE COORDINATION | Facility: CLINIC | Age: 36
End: 2021-12-30
Payer: COMMERCIAL

## 2021-12-30 ASSESSMENT — ACTIVITIES OF DAILY LIVING (ADL): DEPENDENT_IADLS:: INDEPENDENT

## 2021-12-30 NOTE — PROGRESS NOTES
Clinic Care Coordination Contact   Care Coordination Clinician Chart Review  Situation: Patient chart reviewed by SW care coordinator.       Background: Care Coordination initial assessment and enrollment to Care Coordination was 4/8/21.   Patient centered goals were developed with participation from patient.  Mike CC handed patient off to CHW for continued outreach every 30 days.        Assessment: Per chart review, patient outreach completed by CC CHW on 12/13/21 .  Patient is working to accomplish goal ,but is difficult to contact.  FRW just left another message on 12/28/21  Attempting to assist with SNAP and Energy Assistance,    Patient's goal remains appropriate and relevant at this time.   Patient is due for updated Plan of Care.  Annual assessment will be due 4/8/22.      Goals        1. Health Eating. (pt-stated)       Goal statement: I would like to find out if I am eligible for [facility] benefits such as SNAP and  within the next 60 days.     Date goal set: 12/13/2021.  Barriers: English as a second language, financial concerns, difficult to reach via phone  Strengths: Engaged with CCC team when reachable  Date to achieve by: 02/13/2022  Patient expressed understanding of goal: Yes     Action steps to achieve this goal:  1.  I will answer my phone when FRW calls me at my lunch time.    2.  I will submit all required documents to [facility] when needed.   3.  I will communicate with CHW at outreach.   12/30/21 FRW left another message for pt.                Plan/Recommendations: The patient will continue working with Care Coordination to achieve goal as above.  CHW will involve SW CC as needed or if patient is ready to move to maintenance.  MIKE CC will continue to monitor progress to goals and CHW outreaches every 6 weeks.   Plan of Care will be  updated and mailed to patient:      FOUZIA Langston / FOUZIA Watters  [facility]   Care Coordination  [place]  [facility]  12/30/2021 12:33 PM

## 2022-01-03 ENCOUNTER — PATIENT OUTREACH (OUTPATIENT)
Dept: CARE COORDINATION | Facility: CLINIC | Age: 37
End: 2022-01-03
Payer: COMMERCIAL

## 2022-01-03 NOTE — TELEPHONE ENCOUNTER
Clinic Care Coordination Contact  Program: [facility]: [facility] Case #: [id number]  County Worker: Po [phone]  Mnsure #:   Subscriber #: [id number]  Renewal:  Date Applied:     *Call patient during 12 pm lunch hour*    FRW Outreach:  1/3/2022: Outreach attempted x 2. Left message on voicemail indicating last outreach attempt.   Plan: FRW will send an unreachable letter and remove from panel. Patient can be referred back to FRW.      12/28/2021: Outreach attempted x 1. Left message on voicemail with call back information and requested return call.  Plan: FRW will call again within one week.   12/14/2021: Patient re-referred for Counts include 234 beds at the [facility]  12/6/2021: FRW called patient one last time during lunch hour and left VM. Please have patient follow up with [facility] for [facility] questions. [facility] # [phone]. Routing note to CC team.   11/29/2021: FRW called patient during lunch hour and left VM. FRW will make last attempt next week.   11/12/2021: FRW called patient and was unable to leave a vm as the mailbox was full. FRW will make outreach in 1-3 weeks.   11/5/2021: FRW [place] and [place]. FRW called patient and was unable to leave a vm as the mailbox was full. FRW tried patient again on her lunch hour. Patient states she received a letter in the mail but it was only regarding health insurance. We called the [facility] EZ info line and SNAP was denied 8/25/2021. She states she submitted another application in Oct, We called Po and left a vm with call back information requesting the status of her case. FRW will make outreach in 1 week.   10/29/2021: FRW tried calling patient and was unable to get a Noemi  on the line. FRW will make outreach in 1 week.   10/18/21: FRW called patient, patient received letter from [facility] worker, Po. FRW called [facility] Worker and will send patient applications.   10/12/21: Outreach attempted x 1. Left message on  voicemail with call back information and requested return call.  Plan: FRW will call again within one week.     Health Insurance:    Major Programs  This subscriber has eligibility for MA: Medical Assistance.  Elig Type 16: 1619B  Eligibility Begin Date: 10/01/2017  Eligibility End Date: --/--/----  This subscriber is eligible for the following service types: Medical Care ,  Chiropractic ,  Dental Care ,  Hospital ,  Hospital - Inpatient ,  Hospital - Outpatient ,  Emergency Services ,  Pharmacy ,  Professional (Physician) Visit - Office ,  Vision (Optometry) ,  Mental Health ,  Urgent Care  Prepaid Health Plan  This subscriber receives MA37 - Special Needs BasicCare (SNBC) delivered through [facility]. The phone numbers are: [phone] (metro) or [phone] (toll free).  SNBC does not cover personal care assistant (PCA) services, private duty nursing (PDN), or home and community based waiver services. SNBC recipients can obtain these services through [facility]service [facility].  If the subscriber has Medicare, Medicare services must be submitted to original Medicare FFS or the selected Medicare Part D plan prior to submitting to the health plan.    Referral/Screening:  Financial Resource Worker Screening     County Benefits  Is patient requesting help applying for county benefits?: Yes  Have you recently applied for any county benefits?: No  How many people in your household?: 4  Do you buy/eat food together?: Yes  What is the monthly gross income for the household (wages, social security, workers comp, and pension)? : 2048     Insurance:  [place] No  Is this an insurance renewal?: Yes  Is this a new insurance application request?: No        Care Coordination team will tell patient:   Thank you for answering all the questions, based on screening questions, our Financial Resource Worker will reach out to you with additional questions and next steps.     Note for FRW, please try  to call patient at 12:00 PM on her lunch time due to work schedule.    Goals Addressed:

## 2022-01-17 ENCOUNTER — PATIENT OUTREACH (OUTPATIENT)
Dept: NURSING | Facility: CLINIC | Age: 37
End: 2022-01-17
Payer: COMMERCIAL

## 2022-01-17 NOTE — PROGRESS NOTES
Clinic Care Coordination Contact  Patient has completed all goals with Clinic Care Coordination.  Please review the chart and confirm if maintenance  is approved.    -Per patient agreed, CHW assisted patient for COVID-19 booster shot on 1/24/2022 at 5:00 PM.  -Patient went to ED on 1/04/2022 because she was exposed to family members who tested positive with COVID-19 and had symptoms. Patient stated she wanted to get tested and was unable to get the appointment anywhere so she went to ED instead. Patient stated she received result back about a week later and was negative.   -Patient refused for ED follow up appointment as CHW offered.  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  -CHW routing this outreach encounter to  PATY for further chart review and maintenance.  -CHW sets outreach to two months and will adjust outreach to monthly if new goal(s) address and create.

## 2022-01-17 NOTE — PROGRESS NOTES
Clinic Care Coordination Contact    Follow Up Progress Note   Care Gaps:  Health Maintenance Due   Topic Date Due     Pneumococcal Vaccine: Pediatrics (0 to 5 Years) and At-Risk Patients (6 to 64 Years) (1 of 2 - PPSV23) Never done     INFLUENZA VACCINE (1) 09/01/2021     COVID-19 Vaccine (3 - Booster for Moderna series) 01/23/2022     Scheduled for booster shot on 1/24/2022 and PCP will discuss care gaps with patient at the next outreach.       Goals addressed this encounter:   Goals Addressed                    This Visit's Progress       COMPLETED: 1. Health Eating. (pt-stated)   100%      Goal statement: I would like to find out if I am eligible for Counts include 234 beds at the [facility] benefits such as SNAP and  within the next 60 days.     Date goal set: 12/13/2021.  Barriers: English as a second language, financial concerns, difficult to reach via phone  Strengths: Engaged with CCC team when reachable  Date to achieve by: 02/13/2022  Patient expressed understanding of goal: Yes     Action steps to achieve this goal:  1.  My daughter who is 17 year old and speaks English will follow up the [facility]  for SNAP.   2.  My daughter faxed all paperwork to new  last week and will follow up on this.     Note: Patient no longer needs helps regarding [facility] benefits.     Goal completed: 1/17/2022

## 2022-01-23 ENCOUNTER — PATIENT OUTREACH (OUTPATIENT)
Dept: CARE COORDINATION | Facility: CLINIC | Age: 37
End: 2022-01-23
Payer: COMMERCIAL

## 2022-01-23 DIAGNOSIS — Z71.89 COMPLEX CARE COORDINATION: ICD-10-CM

## 2022-01-23 DIAGNOSIS — Z60.3 IMPAIRED ABILITY TO USE COMMUNITY RESOURCES DUE TO LANGUAGE BARRIER: Primary | ICD-10-CM

## 2022-01-23 SDOH — SOCIAL STABILITY - SOCIAL INSECURITY: ACCULTURATION DIFFICULTY: Z60.3

## 2022-01-23 NOTE — PROGRESS NOTES
Clinic Care Coordination Contact     Situation: Pt chart reviewed by SW care coordinator.     Background:   - Most recent SW assessment completed on 6/15/21.  - Pt initially enrolled for FRW assistance.  - Pt went to ED on 1/4/22 to be tested for COVID-19 as she was exposed to family members who tested positive. She received her results about 1 week later and was negative.  - Last seen by PCP (Dr. Nair) on 11/2/21.   - Scheduled for COVID-19 booster shot on 1/24/22 at 5:00pm at [facility] clinic.   - Last outreach by CHW on 1/17/22. During this conversation, no new goals were identified. Pt was instructed to contact assigned CHW directly at [phone] if new concerns arise prior to next outreach call in 2 months.     Assessment: All previous goals completed. Enrollment status adjusted.      Plan:  1.) Pt will be transitioned to [facility] maintenance plan at this time.   - If no new goals/concerns identified at next outreach in 2 months (on/around 3/17/22), CHW will route chart to CCC SW for graduation review.

## 2022-03-16 ENCOUNTER — PATIENT OUTREACH (OUTPATIENT)
Dept: NURSING | Facility: CLINIC | Age: 37
End: 2022-03-16
Payer: COMMERCIAL

## 2022-03-16 ENCOUNTER — PATIENT OUTREACH (OUTPATIENT)
Dept: CARE COORDINATION | Facility: CLINIC | Age: 37
End: 2022-03-16

## 2022-03-16 NOTE — PROGRESS NOTES
Clinic Care Coordination Contact  Patient has completed all goals with Clinic Care Coordination.  Please review the chart and confirm if graduation is approved.    -CHW called and spoke with patient who stated doing well.  -Patient stated she continues working daily and does not gets home until quarter after 5 PM.   -Patient stated she's approved for SNAP for $200 per month for family of 4 in addition of working full time.  -Patient stated she doesn't need to be seen at this time and no additional resource needed.  -CHW is routing this outreach encounter to  for further chart review and graduate from [facility] as appropriate and resolve Episodes.

## 2022-03-16 NOTE — LETTER
[facility] COORD[facility]  [address] [facility] [address]  [place] [address]  March 16, 2022    Wangramirez Alexandra Nah  [address]  [place] [address]    Dear Najma,  Your Care Team congratulates you on your journey to maintain wellness. This document will help guide you on your journey to maintain a healthy lifestyle.  You can use this to help you overcome any barriers you may encounter.  If you should have any questions or concerns, you can contact the members of your Care Team or contact your Primary Care Clinic for assistance.     Health Maintenance  Health Maintenance Reviewed:      My Access Plan  Medical Emergency 911   Primary Clinic Line [facility] [phone]   24 Hour Appointment Line [phone] or  [phone]) (toll-free)   24 Hour Nurse Line [phone] (toll-free)   Preferred Urgent Care     Preferred Hospital     Preferred Pharmacy [id number] - [place], [place] - [address] AT [facility] [address]     Behavioral Health Crisis Line The National Suicide Prevention Lifeline at 1-721.681.3148 or 911     My Care Team Members  Patient Care Team       Relationship Specialty Notifications Start End    Danielle Nair MD PCP - General   9/[birth year]07     Phone: [phone] Fax: [phone]         [address] [place] [address]    Danielle Nair MD Assigned PCP   11/7/21     Phone: [phone] Fax: [phone]         [address] [place] [address]              Goals        COMPLETED: 1. Health Eating. (pt-stated)       Goal statement: I would like to find out if I am eligible for county benefits such as SNAP and  within the next 60 days.     Date goal set: 12/13/2021.  Barriers: English as a second language, financial concerns, difficult to reach via phone  Strengths: Engaged with CCC team when reachable  Date to achieve by: 02/13/2022  Patient expressed understanding of goal: Yes     Action steps to achieve  this goal:  1.  My daughter who is 17 year old and speaks English will follow up the [facility]  for SNAP.   2.  My daughter faxed all paperwork to new  last week and will follow up on this.     Note: Patient no longer needs helps regarding [facility] benefits.     Goal completed: 1/17/2022         COMPLETED: Financial Wellbeing (pt-stated)        Goal statement: I would like to find out if I am eligible for [facility] benefits such as SNAP, cash assistance, etc. within the next 60 days.     Date goal set: 6/15/21  Barriers: English as a second language, financial concerns, difficult to reach via phone  Strengths: Engaged with CCC team when reachable  Date to achieve by:   10 2/21  Patient expressed understanding of goal: Yes     Action steps to achieve this goal:  1.  I only need SNAP and energy assistance at this time and I applied yesterday at [facility].   2.  I will follow up with [facility] or go to the [facility] to speak with a person who helped me applied.   12/30/21. Pt  hard to reach. FRW  left a Voice mail message again on 12.28 .      Goal update: 07/13/2021; 8/31/202112/30/21         COMPLETED: Financial Wellbeing (pt-stated)       Goal statement: I would like to find out if I am eligible for [facility] benefits such as SNAP and  within the next 60 days.     Date goal set: 10/05/2021  Barriers: English as a second language, financial concerns, difficult to reach via phone  Strengths: Engaged with CCC team when reachable  Date to achieve by: 12/06/21  Patient expressed understanding of goal: Yes     Action steps to achieve this goal:  1.  I only need SNAP and energy assistance at this time.   2.  I will answer my phone when FRW calls.  3.  I will provide all required documents when needed.     Note: Patient has not received application that FRW mentioned to patient at previous call with patient.    Goal update: 11/02/21  Completed: 12/31/21         COMPLETED: Mental Health  Management. (pt-stated)       Goal statement: I would like to be referred for [facility] and in-home therapy within the next 60 days.     Date goal set: 6/15/21  Barriers: English as a second language, financial concerns, difficult to reach via phone  Strengths: Engaged with CCC team when reachable  Date to achieve by: 8/15/21  Patient expressed understanding of goal: Yes     Action steps to achieve this goal:  1.  I still want [facility] worker.  2.  I will try to answer my phone when Radha calls for DA.    Goal update: 10/06/2021             Advance Care Plans/Directives Type:      We notice that you do not have an Advance Directive on file. Upon completion of your Health Care Directive, please bring a copy with you to your next office visit.    It has been your Clinic Care Team's pleasure to work with you on your goals.    Regards,  Your Clinic Care Team

## 2022-04-13 DIAGNOSIS — G89.29 OTHER CHRONIC PAIN: ICD-10-CM

## 2022-04-13 DIAGNOSIS — Z76.0 ENCOUNTER FOR MEDICATION REFILL: Primary | ICD-10-CM

## 2022-04-13 RX ORDER — GABAPENTIN 300 MG/1
CAPSULE ORAL
Qty: 270 CAPSULE | Refills: 3 | Status: SHIPPED | OUTPATIENT
Start: 2022-04-13 | End: 2023-06-01

## 2022-04-13 NOTE — TELEPHONE ENCOUNTER
Reason for Call:  Medication or medication refill:    Do you use a [facility]? no     Name of the pharmacy and phone number for the current request:  [facility]  @ [phone]    Name of the medication requested: Gabapentin 300 mg capsules take one cap three times daily     Office visit  7/21/21    Can we leave a detailed message on this number? NO    Phone number patient can be reached at: Home number on file [phone] (home)    Best Time: any     Call taken on 4/13/2022 at 10:07 AM by Rosa Elena Bran CMA,CMT

## 2022-05-23 DIAGNOSIS — G89.29 OTHER CHRONIC PAIN: ICD-10-CM

## 2022-05-23 DIAGNOSIS — E56.9 VITAMIN DEFICIENCY: ICD-10-CM

## 2022-05-23 DIAGNOSIS — Z76.0 ENCOUNTER FOR MEDICATION REFILL: Primary | ICD-10-CM

## 2022-05-23 DIAGNOSIS — F32.A DEPRESSED: ICD-10-CM

## 2022-05-23 RX ORDER — PAROXETINE 20 MG/1
TABLET, FILM COATED ORAL
Qty: 180 TABLET | Refills: 3 | Status: SHIPPED | OUTPATIENT
Start: 2022-05-23 | End: 2023-05-31

## 2022-05-23 RX ORDER — ACETAMINOPHEN 325 MG/1
TABLET ORAL
Qty: 360 TABLET | Refills: 3 | Status: SHIPPED | OUTPATIENT
Start: 2022-05-23 | End: 2023-07-10

## 2022-05-23 NOTE — TELEPHONE ENCOUNTER
Reason for Call:  Medication or medication refill:    Do you use a [facility]?  Name of the pharmacy and phone number for the current request:  [facility]    Name of the medication requested: Tylenol, Paroxetine, and VIT D (TC unable to find Vit D on pt med list)    Other request:     Can we leave a detailed message on this number? YES    Phone number patient can be reached at: Home number on file [phone] (home)    Best Time: ANY    Call taken on 5/23/2022 at 8:44 AM by Bryon Parrish

## 2022-09-09 ENCOUNTER — OFFICE VISIT (OUTPATIENT)
Dept: FAMILY MEDICINE | Facility: CLINIC | Age: 37
End: 2022-09-09
Payer: COMMERCIAL

## 2022-09-09 VITALS
OXYGEN SATURATION: 98 % | TEMPERATURE: 97.6 F | DIASTOLIC BLOOD PRESSURE: 87 MMHG | BODY MASS INDEX: 36.88 KG/M2 | SYSTOLIC BLOOD PRESSURE: 138 MMHG | HEART RATE: 67 BPM | WEIGHT: 195.2 LBS | RESPIRATION RATE: 18 BRPM

## 2022-09-09 DIAGNOSIS — N76.0 VAGINITIS AND VULVOVAGINITIS: ICD-10-CM

## 2022-09-09 DIAGNOSIS — A59.01 TRICHOMONAL VAGINITIS: Primary | ICD-10-CM

## 2022-09-09 LAB
CLUE CELLS: ABNORMAL
TRICHOMONAS, WET PREP: PRESENT
WBC'S/HIGH POWER FIELD, WET PREP: ABNORMAL
YEAST, WET PREP: ABNORMAL

## 2022-09-09 PROCEDURE — 99213 OFFICE O/P EST LOW 20 MIN: CPT | Performed by: PHYSICIAN ASSISTANT

## 2022-09-09 PROCEDURE — 87591 N.GONORRHOEAE DNA AMP PROB: CPT | Performed by: PHYSICIAN ASSISTANT

## 2022-09-09 PROCEDURE — 87491 CHLMYD TRACH DNA AMP PROBE: CPT | Performed by: PHYSICIAN ASSISTANT

## 2022-09-09 PROCEDURE — 87210 SMEAR WET MOUNT SALINE/INK: CPT | Performed by: PHYSICIAN ASSISTANT

## 2022-09-09 RX ORDER — METRONIDAZOLE 500 MG/1
500 TABLET ORAL 2 TIMES DAILY
Qty: 14 TABLET | Refills: 0 | Status: SHIPPED | OUTPATIENT
Start: 2022-09-09 | End: 2022-09-16

## 2022-09-09 NOTE — PROGRESS NOTES
Assessment & Plan     Trichomonal vaginitis  Pt tx with flagyl as ordered.    Await GC chlamydia.   Discussed trichamonas as a STI, sexual partner should be notified and tx.  Pt agreeable.    - metroNIDAZOLE (FLAGYL) 500 MG tablet  Dispense: 14 tablet; Refill: 0    Vaginitis and vulvovaginitis  GC/chlamhdia pending.      - Wet prep - Clinic Collect  - NEISSERIA GONORRHOEA PCR  - CHLAMYDIA TRACHOMATIS PCR  - metroNIDAZOLE (FLAGYL) 500 MG tablet  Dispense: 14 tablet; Refill: 0         Kamille Singh PA-C  [facility] KARI Yao is a 36 year old female who presents to clinic today for the following health issues:  Chief Complaint   Patient presents with     Vaginal Discharge     Discharge, itching, and swelling.  Still has bleeding more like clots      HPI    Watery discharge before period.    After period discharge persists. Very itchy and swelling. No abx.    No new or different sexual partners.    No urine sx: no frequeny. Urgency or hematuria. Dysuria is primarily due to vaginitis.          Review of Systems  Constitutional, HEENT, cardiovascular, pulmonary, gi and gu systems are negative, except as otherwise noted.      Objective    /87   Pulse 67   Temp 97.6  F (36.4  C) (Tympanic)   Resp 18   Wt 88.5 kg (195 lb 3.2 oz)   LMP 08/26/2022   SpO2 98%   BMI 36.88 kg/m    Physical Exam   Patient is in no acute distress and appears well.  No costovertebral angle tenderness is present.  Abdomen is soft nontender to light or deep palpation no masses or hepatosplenomegaly present.  EG that of normal young female,   Mild vulvar inflammation, no open wounds, mild erythma .  sigificant frothy yellow discharge in vaginal vault.    Cervix with mild spotting, no CMT, uturus is mildline nontender.      Results for orders placed or performed in visit on 09/09/22   Wet prep - Clinic Collect     Status: Abnormal    Specimen: Vagina; Swab   Result Value Ref Range    Trichomonas  Present (A) Absent    Yeast Absent Absent    Clue Cells Absent Absent    WBCs/high power field 4+ (A) None

## 2022-09-10 LAB
C TRACH DNA SPEC QL NAA+PROBE: NEGATIVE
N GONORRHOEA DNA SPEC QL NAA+PROBE: NEGATIVE

## 2022-10-01 ENCOUNTER — HEALTH MAINTENANCE LETTER (OUTPATIENT)
Age: 37
End: 2022-10-01

## 2022-11-22 ENCOUNTER — OFFICE VISIT (OUTPATIENT)
Dept: FAMILY MEDICINE | Facility: CLINIC | Age: 37
End: 2022-11-22
Payer: COMMERCIAL

## 2022-11-22 VITALS
HEART RATE: 75 BPM | BODY MASS INDEX: 37.85 KG/M2 | TEMPERATURE: 99 F | WEIGHT: 200.3 LBS | SYSTOLIC BLOOD PRESSURE: 103 MMHG | OXYGEN SATURATION: 97 % | DIASTOLIC BLOOD PRESSURE: 71 MMHG | RESPIRATION RATE: 16 BRPM

## 2022-11-22 DIAGNOSIS — R68.83 CHILLS: Primary | ICD-10-CM

## 2022-11-22 LAB
FLUAV AG SPEC QL IA: NEGATIVE
FLUBV AG SPEC QL IA: NEGATIVE

## 2022-11-22 PROCEDURE — U0003 INFECTIOUS AGENT DETECTION BY NUCLEIC ACID (DNA OR RNA); SEVERE ACUTE RESPIRATORY SYNDROME CORONAVIRUS 2 (SARS-COV-2) (CORONAVIRUS DISEASE [COVID-19]), AMPLIFIED PROBE TECHNIQUE, MAKING USE OF HIGH THROUGHPUT TECHNOLOGIES AS DESCRIBED BY CMS-2020-01-R: HCPCS | Performed by: PHYSICIAN ASSISTANT

## 2022-11-22 PROCEDURE — 87804 INFLUENZA ASSAY W/OPTIC: CPT | Performed by: PHYSICIAN ASSISTANT

## 2022-11-22 PROCEDURE — 99213 OFFICE O/P EST LOW 20 MIN: CPT | Mod: CS | Performed by: PHYSICIAN ASSISTANT

## 2022-11-22 PROCEDURE — U0005 INFEC AGEN DETEC AMPLI PROBE: HCPCS | Performed by: PHYSICIAN ASSISTANT

## 2022-11-22 ASSESSMENT — ENCOUNTER SYMPTOMS
SORE THROAT: 0
FEVER: 1
CHILLS: 1
COUGH: 1
FATIGUE: 1

## 2022-11-22 NOTE — LETTER
November 22, 2022      Najma Rodriguez  [address] [place] [address]        To Whom It May Concern:    Najma Rodriguez  was seen on 11/22/22.  Please excuse her for her absences from work on Monday and Tuesday. Expected time away is 1-5 more days.         Sincerely,        Ludmila Donaldson PA-C

## 2022-11-22 NOTE — PROGRESS NOTES
Patient presents with:  Flu Symptoms: X yesterday. Fever. Dry cough. No chest pain. Fatigue. Bodyache. Chills. Pt states feel dehydrate. Some post nasal drip. Odor to mucous. Mucous on chest.      Clinical Decision Making: Patient experiencing sick symptoms that started yesterday.  Influenza test is negative.  COVID test is in process.  Physical exam is relatively benign.  We discussed supportive cares.  Patient was given note to excuse absence from work.  She will follow-up if symptoms fail to improve over the course the next week.      ICD-10-CM    1. Chills  R68.83 Influenza A & B Antigen - Clinic Collect     Symptomatic; Unknown COVID-19 Virus (Coronavirus) by PCR Nose          There are no Patient Instructions on file for this visit.    HPI:  Najma Rodriguez is a 37 year old female who presents today complaining of flu symptoms that started yesterday.  Patient experiencing fever, dry cough, fatigue, body aches, chills. No COVID testing done. She has been taking Tylenol.     History obtained from the patient.    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)  2015-02: Obsessive compulsive disorder  Functional Murmur  Staring Spells (Reported)  Vitamin D deficiency  Moderate episode of recurrent major depressive disorder (H)  Sciatica  Borderline personality disorder (H)  Chronic Tension-type Headache  Anxiety Disorder Due To General Medical Condition With Panic Attacks  Chronic Pain  Lower Back Pain  Neuralgia  Post-traumatic Stress Disorder  Latent Tuberculosis  Gynecologic Services Intrauterine Device (IUD)      Past Medical History:   Diagnosis Date     Anxiety disorder      Borderline personality disorder (H)      Chronic tension type headache      Depression      Functional murmur      Neuralgia      OCD (obsessive compulsive disorder)      Post traumatic stress disorder (PTSD)      Sciatica      Vitamin D deficiency        Social History     Tobacco Use     Smoking status: Never     Smokeless  tobacco: Never     Tobacco comments:     no passive exposure   Substance Use Topics     Alcohol use: No       Review of Systems   Constitutional: Positive for chills, fatigue and fever.   HENT: Positive for congestion. Negative for sore throat.    Respiratory: Positive for cough.        Vitals:    11/22/22 1509   BP: 103/71   BP Location: Right arm   Patient Position: Sitting   Cuff Size: Adult Large   Pulse: 75   Resp: 16   Temp: 99  F (37.2  C)   TempSrc: Oral   SpO2: 97%   Weight: 90.9 kg (200 lb 4.8 oz)       Physical Exam  Vitals and nursing note reviewed.   Constitutional:       General: She is not in acute distress.     Appearance: She is not toxic-appearing or diaphoretic.   HENT:      Head: Normocephalic and atraumatic.      Right Ear: External ear normal.      Left Ear: External ear normal.      Mouth/Throat:      Pharynx: No posterior oropharyngeal erythema.   Eyes:      Conjunctiva/sclera: Conjunctivae normal.   Cardiovascular:      Rate and Rhythm: Normal rate and regular rhythm.      Heart sounds: No murmur heard.  Pulmonary:      Effort: Pulmonary effort is normal. No respiratory distress.      Breath sounds: No stridor. No wheezing, rhonchi or rales.   Neurological:      Mental Status: She is alert.   Psychiatric:         Mood and Affect: Mood normal.         Behavior: Behavior normal.         Thought Content: Thought content normal.         Judgment: Judgment normal.         Results:  Results for orders placed or performed in visit on 11/22/22   Influenza A & B Antigen - Clinic Collect     Status: Normal    Specimen: Nose; Swab   Result Value Ref Range    Influenza A antigen Negative Negative    Influenza B antigen Negative Negative    Narrative    Test results must be correlated with clinical data. If necessary, results should be confirmed by a molecular assay or viral culture.         At the end of the encounter, I discussed results, diagnosis, medications. Discussed red flags for immediate return  to clinic/ER, as well as indications for follow up if no improvement. Patient understood and agreed to plan. Patient was stable for discharge.

## 2022-11-23 LAB — SARS-COV-2 RNA RESP QL NAA+PROBE: NEGATIVE

## 2022-12-11 NOTE — PROGRESS NOTES
Clinic Care Coordination Contact     Situation: Pt chart reviewed by  care coordinator.     Background:   - Most recent SW assessment completed on 6/15/21.  - Moved to maintenance on 1/23/22. See SW note from this date for further detail regarding course of [facility] enrollment.  - Care Team tab is up-to-date.  - No ED visits or hospitalizations in the past 45 days.  - Last seen by PCP (Dr. Nair) on 11/2/21.  - Last outreach by CHW on 3/16/22 (today). During this conversation, no new goals were identified.     Assessment: All previous goals completed. Episode resolved. Enrollment status adjusted. CCC team members removed from Care Team. Graduation letter generated.     Plan:  1.) Pt has been graduated from [facility]. No further outreach scheduled at this time.  
No

## 2023-02-05 ENCOUNTER — HEALTH MAINTENANCE LETTER (OUTPATIENT)
Age: 38
End: 2023-02-05

## 2023-03-30 ENCOUNTER — OFFICE VISIT (OUTPATIENT)
Dept: INTERNAL MEDICINE | Facility: CLINIC | Age: 38
End: 2023-03-30
Payer: COMMERCIAL

## 2023-03-30 VITALS
DIASTOLIC BLOOD PRESSURE: 72 MMHG | OXYGEN SATURATION: 100 % | SYSTOLIC BLOOD PRESSURE: 98 MMHG | TEMPERATURE: 98 F | WEIGHT: 204.1 LBS | HEIGHT: 61 IN | BODY MASS INDEX: 38.53 KG/M2

## 2023-03-30 DIAGNOSIS — E56.9 VITAMIN DEFICIENCY: ICD-10-CM

## 2023-03-30 DIAGNOSIS — Z00.00 HEALTHY ADULT ON ROUTINE PHYSICAL EXAMINATION: ICD-10-CM

## 2023-03-30 DIAGNOSIS — J02.9 PHARYNGITIS, UNSPECIFIED ETIOLOGY: Primary | ICD-10-CM

## 2023-03-30 DIAGNOSIS — R40.0 DAYTIME SLEEPINESS: ICD-10-CM

## 2023-03-30 DIAGNOSIS — Z76.0 ENCOUNTER FOR MEDICATION REFILL: ICD-10-CM

## 2023-03-30 LAB
ALBUMIN SERPL BCG-MCNC: 4.2 G/DL (ref 3.5–5.2)
ALP SERPL-CCNC: 59 U/L (ref 35–104)
ALT SERPL W P-5'-P-CCNC: 36 U/L (ref 10–35)
ANION GAP SERPL CALCULATED.3IONS-SCNC: 11 MMOL/L (ref 7–15)
AST SERPL W P-5'-P-CCNC: 38 U/L (ref 10–35)
BILIRUB SERPL-MCNC: 0.2 MG/DL
BUN SERPL-MCNC: 13.2 MG/DL (ref 6–20)
CALCIUM SERPL-MCNC: 9.4 MG/DL (ref 8.6–10)
CHLORIDE SERPL-SCNC: 101 MMOL/L (ref 98–107)
CREAT SERPL-MCNC: 0.64 MG/DL (ref 0.51–0.95)
DEPRECATED HCO3 PLAS-SCNC: 25 MMOL/L (ref 22–29)
ERYTHROCYTE [DISTWIDTH] IN BLOOD BY AUTOMATED COUNT: 14.3 % (ref 10–15)
GFR SERPL CREATININE-BSD FRML MDRD: >90 ML/MIN/1.73M2
GLUCOSE SERPL-MCNC: 91 MG/DL (ref 70–99)
HCT VFR BLD AUTO: 40.2 % (ref 35–47)
HGB BLD-MCNC: 12.4 G/DL (ref 11.7–15.7)
MCH RBC QN AUTO: 24.4 PG (ref 26.5–33)
MCHC RBC AUTO-ENTMCNC: 30.8 G/DL (ref 31.5–36.5)
MCV RBC AUTO: 79 FL (ref 78–100)
PLATELET # BLD AUTO: 353 10E3/UL (ref 150–450)
POTASSIUM SERPL-SCNC: 4 MMOL/L (ref 3.4–5.3)
PROT SERPL-MCNC: 7.9 G/DL (ref 6.4–8.3)
RBC # BLD AUTO: 5.08 10E6/UL (ref 3.8–5.2)
SODIUM SERPL-SCNC: 137 MMOL/L (ref 136–145)
TSH SERPL DL<=0.005 MIU/L-ACNC: 1.38 UIU/ML (ref 0.3–4.2)
WBC # BLD AUTO: 7.5 10E3/UL (ref 4–11)

## 2023-03-30 PROCEDURE — 80053 COMPREHEN METABOLIC PANEL: CPT | Performed by: INTERNAL MEDICINE

## 2023-03-30 PROCEDURE — 85027 COMPLETE CBC AUTOMATED: CPT | Performed by: INTERNAL MEDICINE

## 2023-03-30 PROCEDURE — 84443 ASSAY THYROID STIM HORMONE: CPT | Performed by: INTERNAL MEDICINE

## 2023-03-30 PROCEDURE — 99214 OFFICE O/P EST MOD 30 MIN: CPT | Performed by: INTERNAL MEDICINE

## 2023-03-30 PROCEDURE — 36415 COLL VENOUS BLD VENIPUNCTURE: CPT | Performed by: INTERNAL MEDICINE

## 2023-03-30 RX ORDER — AZITHROMYCIN 250 MG/1
TABLET, FILM COATED ORAL
Qty: 6 TABLET | Refills: 0 | Status: SHIPPED | OUTPATIENT
Start: 2023-03-30 | End: 2023-04-04

## 2023-03-30 RX ORDER — ACETAMINOPHEN 325 MG/1
325-650 TABLET ORAL EVERY 6 HOURS PRN
Qty: 90 TABLET | Refills: 3 | Status: SHIPPED | OUTPATIENT
Start: 2023-03-30 | End: 2023-05-31

## 2023-03-30 ASSESSMENT — PAIN SCALES - GENERAL: PAINLEVEL: SEVERE PAIN (6)

## 2023-03-30 ASSESSMENT — PATIENT HEALTH QUESTIONNAIRE - PHQ9
SUM OF ALL RESPONSES TO PHQ QUESTIONS 1-9: 18
10. IF YOU CHECKED OFF ANY PROBLEMS, HOW DIFFICULT HAVE THESE PROBLEMS MADE IT FOR YOU TO DO YOUR WORK, TAKE CARE OF THINGS AT HOME, OR GET ALONG WITH OTHER PEOPLE: EXTREMELY DIFFICULT
SUM OF ALL RESPONSES TO PHQ QUESTIONS 1-9: 18

## 2023-03-30 NOTE — PROGRESS NOTES
" ASSESSMENT AND PLAN:    1. URI with pharyngitis  Will treat with azithromycin 250mg - Zpak dosing.     2. Daytime sleepiness  She does snore, and does describe attacks of daytime sleeping. Exam is mallipati I, will refer to sleep clinic for evaluation.      Follow up in 3 months.     CHIEF COMPLAINT:  Daytime sleepiness    HISTORY OF PRESENT ILLNESS:  Najma Rodriguez is a 37 year old female with recent URI and sore throat.  Minimal cough and some nasal congestion, no high fever.  Also, she has attacks of daytime sleeping at work. No recent significant weight gain.  Does snore, confirmed by her daughter.      REVIEW OF SYSTEMS:   See HPI, all other systems on review are negative.    Past Medical History:   Diagnosis Date     Anxiety disorder      Borderline personality disorder (H)      Chronic tension type headache      Daytime sleepiness 3/30/2023     Depression      Functional murmur      OCD (obsessive compulsive disorder)      Post traumatic stress disorder (PTSD)      Vitamin D deficiency      History   Smoking Status     Never   Smokeless Tobacco     Current     Family History   Problem Relation Age of Onset     Cerebrovascular Disease Sister      Mental Illness Daughter         cutting and suicidal ideation     Past Surgical History:   Procedure Laterality Date      SECTION       VITALS:  Vitals:    23 1430   BP: 98/72   BP Location: Left arm   Patient Position: Sitting   Cuff Size: Adult Large   Temp: 98  F (36.7  C)   TempSrc: Oral   SpO2: 100%   Weight: 92.6 kg (204 lb 1.6 oz)   Height: 1.549 m (5' 0.98\")     Wt Readings from Last 3 Encounters:   23 92.6 kg (204 lb 1.6 oz)   22 90.9 kg (200 lb 4.8 oz)   22 88.5 kg (195 lb 3.2 oz)     PHYSICAL EXAM:  Constitutional:  In NAD, alert and oriented  HEENT: mallipati I mild posterior pharyngeal erythema  Neck: no cervical or axillary adenopathy  Cardiac:  S1 S2   Lungs: Clear  Abdomen:   Soft, flat and non-tender  Psychiatric:  Mood " and behavior appropriate, thinking is clear.     DECISION TO OBTAIN OLD RECORDS AND/OR OBTAIN HISTORY FROM SOMEONE OTHER THAN PATIENT, AND/OR ACCESSING CARE EVERYWHERE):  1  0     REVIEW AND SUMMARIZATION OF OLD RECORDS, AND/OR OBTAINING HISTORY FROM SOMEONE OTHER THAN PATIENT, AND/OR DISCUSSION OF CASE WITH ANOTHER HEALTH CARE PROVIDER:  2 0    REVIEW AND/OR ORDER OF OF CLINICAL LAB TESTS: 1  ordered.    REVIEW AND/OR ORDER OF RADIOLOGY TESTS: 1 0.    REVIEW AND/OR ORDER OF MEDICAL TESTS (EKG/ECHO/COLONOSCOPY/EGD): 1  0    INDEPENDENT  VISUALIZATION OF IMAGE, TRACING, OR SPECIMEN ITSELF (2 EACH):  0     TOTAL: 1    Current Outpatient Medications   Medication Sig Dispense Refill     acetaminophen (TYLENOL) 325 MG tablet Take 1-2 tablets (325-650 mg) by mouth every 6 hours as needed for mild pain 90 tablet 3     albuterol (PROAIR HFA;PROVENTIL HFA;VENTOLIN HFA) 90 mcg/actuation inhaler [ALBUTEROL (PROAIR HFA;PROVENTIL HFA;VENTOLIN HFA) 90 MCG/ACTUATION INHALER] Inhale 2 puffs every 6 (six) hours as needed for wheezing. 2 Inhaler 3     cholecalciferol 125 MCG (5000 UT) CAPS [CHOLECALCIFEROL, VITAMIN D3, 125 MCG (5,000 UNIT) CAPSULE] TAKE 1 CAPSULE BY MOUTH DAILY 90 capsule 3     ferrous sulfate 325 (65 FE) MG tablet [FERROUS SULFATE 325 (65 FE) MG TABLET] Take 1 tablet (325 mg total) by mouth daily. 90 tablet 3     gabapentin (NEURONTIN) 300 MG capsule [GABAPENTIN (NEURONTIN) 300 MG CAPSULE] TAKE 1 CAPSULE(300 MG) BY MOUTH THREE TIMES DAILY 270 capsule 3     ibuprofen (ADVIL,MOTRIN) 400 MG tablet [IBUPROFEN (ADVIL,MOTRIN) 400 MG TABLET] TAKE 1 TABLET(400 MG) BY MOUTH THREE TIMES DAILY AS NEEDED FOR PAIN OR FEVER 30 tablet 0     PARoxetine (PAXIL) 20 MG tablet [PAROXETINE (PAXIL) 20 MG TABLET] TAKE 2 TABLETS(40 MG) BY MOUTH EVERY MORNING 180 tablet 3     acetaminophen (TYLENOL) 325 MG tablet [ACETAMINOPHEN (TYLENOL) 325 MG TABLET] TAKE 2 TABLETS(650 MG) BY MOUTH THREE TIMES DAILY AS NEEDED FOR PAIN 360 tablet 3      famotidine (FOR PEPCID) 10 MG tablet [FAMOTIDINE (FOR PEPCID) 10 MG TABLET] Take 2 tablets (20 mg total) by mouth 2 (two) times a day. 30 tablet 0     famotidine (FOR PEPCID) 10 MG tablet [FAMOTIDINE (FOR PEPCID) 10 MG TABLET] Take 2 tablets (20 mg total) by mouth 2 (two) times a day. (Patient not taking: Reported on 3/30/2023) 30 tablet 0     hydrocortisone 1 % lotion [HYDROCORTISONE 1 % LOTION] Use over affected area twice daily (Patient not taking: Reported on 3/30/2023) 118 mL 0     loratadine (CLARITIN) 10 mg tablet [LORATADINE (CLARITIN) 10 MG TABLET] Take 1 tablet (10 mg total) by mouth daily. (Patient not taking: Reported on 3/30/2023) 30 tablet 11     prazosin (MINIPRESS) 1 MG capsule [PRAZOSIN (MINIPRESS) 1 MG CAPSULE] Take 1 capsule (1 mg total) by mouth at bedtime. (Patient not taking: Reported on 3/30/2023) 30 capsule 12     propranoloL (INDERAL) 10 MG tablet [PROPRANOLOL (INDERAL) 10 MG TABLET] Take 1 tablet (10 mg total) by mouth 3 (three) times a day as needed (for panic attacks). (Patient not taking: Reported on 3/30/2023) 30 tablet 3     Rafael King MD  Internal Medicine  [facility]

## 2023-04-19 ENCOUNTER — OFFICE VISIT (OUTPATIENT)
Dept: SLEEP MEDICINE | Facility: CLINIC | Age: 38
End: 2023-04-19
Attending: INTERNAL MEDICINE
Payer: COMMERCIAL

## 2023-04-19 VITALS
HEIGHT: 61 IN | HEART RATE: 69 BPM | WEIGHT: 204.1 LBS | OXYGEN SATURATION: 98 % | DIASTOLIC BLOOD PRESSURE: 76 MMHG | SYSTOLIC BLOOD PRESSURE: 108 MMHG | BODY MASS INDEX: 38.53 KG/M2

## 2023-04-19 DIAGNOSIS — E66.01 MORBID OBESITY (H): ICD-10-CM

## 2023-04-19 DIAGNOSIS — R06.83 SNORING: ICD-10-CM

## 2023-04-19 DIAGNOSIS — R51.9 SLEEP RELATED HEADACHES: ICD-10-CM

## 2023-04-19 DIAGNOSIS — R40.0 DAYTIME SLEEPINESS: Primary | ICD-10-CM

## 2023-04-19 DIAGNOSIS — G47.59 OTHER PARASOMNIA: ICD-10-CM

## 2023-04-19 PROCEDURE — 99204 OFFICE O/P NEW MOD 45 MIN: CPT | Performed by: INTERNAL MEDICINE

## 2023-04-19 ASSESSMENT — SLEEP AND FATIGUE QUESTIONNAIRES
HOW LIKELY ARE YOU TO NOD OFF OR FALL ASLEEP WHILE SITTING QUIETLY AFTER LUNCH WITHOUT ALCOHOL: HIGH CHANCE OF DOZING
HOW LIKELY ARE YOU TO NOD OFF OR FALL ASLEEP WHILE SITTING INACTIVE IN A PUBLIC PLACE: HIGH CHANCE OF DOZING
HOW LIKELY ARE YOU TO NOD OFF OR FALL ASLEEP WHEN YOU ARE A PASSENGER IN A CAR FOR AN HOUR WITHOUT A BREAK: WOULD NEVER DOZE
HOW LIKELY ARE YOU TO NOD OFF OR FALL ASLEEP WHILE SITTING AND READING: MODERATE CHANCE OF DOZING
HOW LIKELY ARE YOU TO NOD OFF OR FALL ASLEEP WHILE SITTING AND TALKING TO SOMEONE: MODERATE CHANCE OF DOZING
HOW LIKELY ARE YOU TO NOD OFF OR FALL ASLEEP WHILE LYING DOWN TO REST IN THE AFTERNOON WHEN CIRCUMSTANCES PERMIT: HIGH CHANCE OF DOZING
HOW LIKELY ARE YOU TO NOD OFF OR FALL ASLEEP IN A CAR, WHILE STOPPED FOR A FEW MINUTES IN TRAFFIC: WOULD NEVER DOZE
HOW LIKELY ARE YOU TO NOD OFF OR FALL ASLEEP WHILE WATCHING TV: HIGH CHANCE OF DOZING

## 2023-04-19 NOTE — PROGRESS NOTES
Additional 15 minutes on the date of service was spent performing the following:    -Preparing to see the patient  -Obtaining and/or reviewing separately obtained history   -Ordering medications, tests, or procedures   -Documenting clinical information in the electronic or other health record     All the information was obtained and communicated with the help of the interpretor.    Assessment and Plan:    In summary Najma Rodriguez is a 37 year old year old female here for sleep disturbance.  1. Hypersomnia/Snoring/Sleep related headaches/Morbid Obesity/Other parasomnia   Najma Rodriguez has high risk for obstructive sleep apnea based on the history of hypersomnia, snoring and a crowded airway. I educated the patient on the underlying pathophysiology of obstructive sleep apnea. We reviewed the risks associated with sleep apnea, including increased cardiovascular risk and overall death. We talked about treatments briefly. I recommend getting a split-night nocturnal polysomnography with seizure montage. The patient should return to the clinic to discuss results and treatment option in a patient-centered approach.    History of present illness:    She is a 37 year old female who comes to the clinic with a chief complaint of excessive daytime sleepiness that is been going on for more than 10 years.  The patient denies any episodes of witnessed apnea, she has been told that she does have loud snoring during sleep.  She also complains of waking up frequently with headaches.  She also has had episodes of acting out in her dreams in terms of vocalization.  She recalls horrific nightmares that would cause her to have these acting out episodes.     Sleep-Wake Cycle:    TIME IN BED:    1) Work/School Days:    Do you work or go to school? Yes   What time do you usually get into bed? 10pm   About how long does it take you to fall asleep? 1-2 hrs   How often do you have trouble falling asleep? 3   How often do you wake up during the  night? 2   Do you work days/evenings/nights/rotating shifts? Days   What wakes you up at night? Snorting self awake    Use the bathroom    Nightmares   How often do you have trouble falling back to sleep?    About how long does it take to fall back to sleep?    What do you usually do if you have trouble getting back to sleep? watch tv   What time do you usually get out of bed to start your day? 5am   Do you use an alarm? Yes   2) Weekends/Non-work Days/All Other Days    What time do you usually get into bed? 10pm   About how long does it take you to fall asleep? 1-2hours   What time do you usually get out of bed to start your day? 7am   Do you use an alarm? No   SLEEP NEED    On average, about how much sleep do you think you get? 5hrs   About how much sleep do you think you need? 6hrs   SLEEP POSITION    Which sleep positions do you prefer? Side   Do you do any of the following activities in bed? Watch TV    Use phone, computer, or tablet   How often do you take a nap on purpose? 1   About how long are your naps? 3hrs   Do you feel better after naps? No   How often do you doze off unintentionally? 7   Have you ever had a driving accident or near-miss due to sleepiness/drowsiness? No     Stop Bang Questionnaire    Question 4/19/2023 11:20 AM CDT - Filed by Patient   Do you SNORE loudly (louder than talking or loud enough to be heard through closed doors)? Yes   Do you often feel TIRED, fatigued, or sleepy during daytime? Yes   Has anyone OBSERVED you stop breathing during your sleep? No   Do you have or are you being treated for high blood PRESSURE? No   BMI more than 35kg/m2? Yes   AGE over 50 years old? No   NECK circumference > 16 inches (40cm)? Yes   GENDER: Male? No   STOP-BANG Total Score (range: 0 - 8) 5 (High risk of EMA) Critical        LETTY:  LETTY Total Score: 14  Total score - Adah: 16 (4/19/2023 11:20 AM)    Patient told to return in one week after the sleep study is interpreted.    Patient Active  Problem List   Diagnosis     Staring Spells (Reported)     Vitamin D deficiency     Moderate episode of recurrent major depressive disorder (H)     Borderline personality disorder (H)     Chronic Tension-type Headache     Anxiety Disorder Due To General Medical Condition With Panic Attacks     Chronic Pain     Post-traumatic Stress Disorder     Latent Tuberculosis     Obsessive compulsive disorder     Calcified granuloma of lung (H)     Daytime sleepiness       Past Medical History  Past Medical History:   Diagnosis Date     Anxiety disorder      Borderline personality disorder (H)      Chronic tension type headache      Daytime sleepiness 3/30/2023     Depression      Functional murmur      OCD (obsessive compulsive disorder)      Post traumatic stress disorder (PTSD)      Vitamin D deficiency         Past Surgical History  Past Surgical History:   Procedure Laterality Date      SECTION          Meds  Current Outpatient Medications   Medication Sig Dispense Refill     acetaminophen (TYLENOL) 325 MG tablet [ACETAMINOPHEN (TYLENOL) 325 MG TABLET] TAKE 2 TABLETS(650 MG) BY MOUTH THREE TIMES DAILY AS NEEDED FOR PAIN 360 tablet 3     albuterol (PROAIR HFA;PROVENTIL HFA;VENTOLIN HFA) 90 mcg/actuation inhaler [ALBUTEROL (PROAIR HFA;PROVENTIL HFA;VENTOLIN HFA) 90 MCG/ACTUATION INHALER] Inhale 2 puffs every 6 (six) hours as needed for wheezing. 2 Inhaler 3     cholecalciferol 125 MCG (5000 UT) CAPS [CHOLECALCIFEROL, VITAMIN D3, 125 MCG (5,000 UNIT) CAPSULE] TAKE 1 CAPSULE BY MOUTH DAILY 90 capsule 3     ferrous sulfate 325 (65 FE) MG tablet [FERROUS SULFATE 325 (65 FE) MG TABLET] Take 1 tablet (325 mg total) by mouth daily. 90 tablet 3     gabapentin (NEURONTIN) 300 MG capsule [GABAPENTIN (NEURONTIN) 300 MG CAPSULE] TAKE 1 CAPSULE(300 MG) BY MOUTH THREE TIMES DAILY 270 capsule 3     ibuprofen (ADVIL,MOTRIN) 400 MG tablet [IBUPROFEN (ADVIL,MOTRIN) 400 MG TABLET] TAKE 1 TABLET(400 MG) BY MOUTH THREE TIMES DAILY AS  NEEDED FOR PAIN OR FEVER 30 tablet 0     PARoxetine (PAXIL) 20 MG tablet [PAROXETINE (PAXIL) 20 MG TABLET] TAKE 2 TABLETS(40 MG) BY MOUTH EVERY MORNING 180 tablet 3     acetaminophen (TYLENOL) 325 MG tablet Take 1-2 tablets (325-650 mg) by mouth every 6 hours as needed for mild pain 90 tablet 3     famotidine (FOR PEPCID) 10 MG tablet [FAMOTIDINE (FOR PEPCID) 10 MG TABLET] Take 2 tablets (20 mg total) by mouth 2 (two) times a day. (Patient not taking: Reported on 4/19/2023) 30 tablet 0     famotidine (FOR PEPCID) 10 MG tablet [FAMOTIDINE (FOR PEPCID) 10 MG TABLET] Take 2 tablets (20 mg total) by mouth 2 (two) times a day. (Patient not taking: Reported on 3/30/2023) 30 tablet 0     hydrocortisone 1 % lotion [HYDROCORTISONE 1 % LOTION] Use over affected area twice daily (Patient not taking: Reported on 3/30/2023) 118 mL 0     loratadine (CLARITIN) 10 mg tablet [LORATADINE (CLARITIN) 10 MG TABLET] Take 1 tablet (10 mg total) by mouth daily. (Patient not taking: Reported on 3/30/2023) 30 tablet 11     prazosin (MINIPRESS) 1 MG capsule [PRAZOSIN (MINIPRESS) 1 MG CAPSULE] Take 1 capsule (1 mg total) by mouth at bedtime. (Patient not taking: Reported on 3/30/2023) 30 capsule 12     propranoloL (INDERAL) 10 MG tablet [PROPRANOLOL (INDERAL) 10 MG TABLET] Take 1 tablet (10 mg total) by mouth 3 (three) times a day as needed (for panic attacks). (Patient not taking: Reported on 3/30/2023) 30 tablet 3        Allergies  No known drug allergies     Social History  Social History     Socioeconomic History     Marital status: Single     Spouse name: Not on file     Number of children: 3     Years of education: Not on file     Highest education level: Not on file   Occupational History     Not on file   Tobacco Use     Smoking status: Never     Smokeless tobacco: Former     Tobacco comments:     no passive exposure  / Pt reports chewing Beetle Nuts   Vaping Use     Vaping status: Never Used   Substance and Sexual Activity     Alcohol  use: No     Drug use: No     Sexual activity: Not Currently     Partners: Male   Other Topics Concern     Not on file   Social History Narrative    2021:        - [place] refugee. Born in [facility]. [place] . She is a [place].    - 3 children;  from ex- since 2017.        FAMILY HISTORY    - Parents and sister  in [place] [facility]    - Orphaned at 11yo    - 2 children born in [place], 1 child born here        LIVING SITUATION    - Lives with 3 children    - Household of 4        LANGUAGE(S) SPOKEN    - [place]    - English        EMPLOYMENT    - Past employment: Previously received [employer] benefits for approximately 4 years    - Current employment: Working for [employer] as a  (6 hours/day, $15/hour, summers off)     Social Determinants of Health     Financial Resource Strain: High Risk (2021)    Overall Financial Resource Strain (CARDIA)      Difficulty of Paying Living Expenses: Hard   Food Insecurity: Food Insecurity Present (2021)    Hunger Vital Sign      Worried About Running Out of Food in the Last Year: Sometimes true      Ran Out of Food in the Last Year: Sometimes true   Transportation Needs: No Transportation Needs (2021)    PRAPARE - Transportation      Lack of Transportation (Medical): No      Lack of Transportation (Non-Medical): No   Physical Activity: Insufficiently Active (2021)    Exercise Vital Sign      Days of Exercise per Week: 3 days      Minutes of Exercise per Session: 30 min   Stress: Stress Concern Present (2021)    [place] [employer] - Occupational Stress Questionnaire      Feeling of Stress : Rather much   Social Connections: Moderately Isolated (2021)    Social Connection and Isolation Panel [NHANES]      Frequency of Communication with Friends and Family: More than three times a week      Frequency of Social Gatherings with Friends and Family: More than three times a week      Attends Restoration Services:  "More than 4 times per year      Active Member of Clubs or Organizations: No      Attends Club or Organization Meetings: Never      Marital Status:    Intimate Partner Violence: Not At Risk (7/20/2021)    Humiliation, Afraid, Rape, and Kick questionnaire      Fear of Current or Ex-Partner: No      Emotionally Abused: No      Physically Abused: No      Sexually Abused: No   Housing Stability: Low Risk  (7/20/2021)    Housing Stability Vital Sign      Unable to Pay for Housing in the Last Year: No      Number of Places Lived in the Last Year: 1      Unstable Housing in the Last Year: No        Family History  Family History   Problem Relation Age of Onset     Cerebrovascular Disease Sister      Mental Illness Daughter         cutting and suicidal ideation     Review of Systems:  Constitutional: Negative except as noted in HPI.   Eyes: Negative except as noted in HPI.   ENT: Negative except as noted in HPI.   Cardiovascular: Negative except as noted in HPI.   Respiratory: Negative except as noted in HPI.   Gastrointestinal: Negative except as noted in HPI.   Genitourinary: Negative except as noted in HPI.   Musculoskeletal: Negative except as noted in HPI.   Integumentary: Negative except as noted in HPI.   Neurological: Negative except as noted in HPI.   Psychiatric: Negative except as noted in HPI.   Endocrine: Negative except as noted in HPI.   Hematologic/Lymphatic: Negative except as noted in HPI.      Physical Exam:  /76   Pulse 69   Ht 1.549 m (5' 0.98\")   Wt 92.6 kg (204 lb 1.6 oz)   LMP 03/14/2023 (Exact Date)   SpO2 98%   BMI 38.59 kg/m    BMI:Body mass index is 38.59 kg/m .   GEN: NAD, morbidly obese  Head: Normocephalic.  EYES: PERRLA, EOMI  ENT: Oropharynx is clear, Gatica class 4+ airway.   Nasal mucosa is moist without erythema  Neck : Thyroid is within normal limits.   CV: Regular rate and rhythm, S1 & S2 positive.  LUNGS: Bilateral breathsounds heard.   ABDOMEN: Positive bowel " sounds in all quadrants, soft, no rebound or guarding  MUSCULOSKELETAL: Bilateral trace leg swelling  SKIN: warm, dry, no rashes  Neurological: Alert, oriented to time, place, and person. Gait is normal. Strength 5/5 in all extremities.  Psych: normal mood, normal affect     Labs/Studies:     No results found for: PH, PHARTERIAL, PO2, [id number], SAT, PCO2, HCO3, BASEEXCESS, CIERRA, BEB  Lab Results   Component Value Date    TSH 1.38 03/30/2023    TSH 1.58 11/02/2021     Lab Results   Component Value Date    GLC 91 03/30/2023    GLC 88 11/02/2021     Lab Results   Component Value Date    HGB 12.4 03/30/2023    HGB 14.2 03/10/2021     Lab Results   Component Value Date    BUN 13.2 03/30/2023    BUN 14 11/02/2021    CR 0.64 03/30/2023    CR 0.72 11/02/2021     Lab Results   Component Value Date    AST 38 (H) 03/30/2023    AST 27 11/02/2021    ALT 36 (H) 03/30/2023    ALT 22 11/02/2021    ALKPHOS 59 03/30/2023    ALKPHOS 54 11/02/2021    BILITOTAL 0.2 03/30/2023    BILITOTAL 0.2 11/02/2021     No results found for: UAMP, UBARB, BENZODIAZEUR, UCANN, UCOC, OPIT, UPCP      Patient verbalized understanding of these issues, agrees with the plan and all questions were answered today. Patient was given an opportuntity to voice any other symptoms or concerns not listed above. Patient did not have any other symptoms or concerns.         Alvin Goodman DO,   Board Certified in Internal Medicine and Sleep Medicine    (Note created with Dragon voice recognition and unintended spelling errors and word substitutions may occur)

## 2023-04-19 NOTE — NURSING NOTE
"Chief Complaint   Patient presents with     Sleep Problem     Fatigue        Initial /76   Pulse 69   Ht 1.549 m (5' 0.98\")   Wt 92.6 kg (204 lb 1.6 oz)   LMP 03/14/2023 (Exact Date)   SpO2 98%   BMI 38.59 kg/m   Estimated body mass index is 38.59 kg/m  as calculated from the following:    Height as of this encounter: 1.549 m (5' 0.98\").    Weight as of this encounter: 92.6 kg (204 lb 1.6 oz).    Medication Reconciliation: complete    Neck circumference: 36 centimeters.    DME: n/a    Sleep study and f/u appt scheduled.      Sylwia Zarate MA  "

## 2023-04-19 NOTE — PATIENT INSTRUCTIONS
"     What is sleep apnea?     Sleep apnea is a condition that makes you stop breathing for short periods while you are asleep. There are 2 types of sleep apnea. One is called \"obstructive sleep apnea,\" and the other is called \"central sleep apnea.\"  In obstructive sleep apnea, you stop breathing because your throat narrows or closes (figure 1). In central sleep apnea, you stop breathing because your brain does not send the right signals to your muscles to make you breathe. When people talk about sleep apnea, they are usually referring to obstructive sleep apnea, which is what this article is about.  People with sleep apnea do not know that they stop breathing when they are asleep. But they do sometimes wake up startled or gasping for breath. They also often hear from loved ones that they snore.  What are the symptoms of sleep apnea? -- The main symptoms of sleep apnea are loud snoring, tiredness, and daytime sleepiness. Other symptoms can include:  ?Restless sleep  ?Waking up choking or gasping  ?Morning headaches, dry mouth, or sore throat  ?Waking up often to urinate  ?Waking up feeling unrested or groggy  ?Trouble thinking clearly or remembering things  Some people with sleep apnea don't have symptoms, or they don't know they have them. They might figure that it's normal to be tired or to snore a lot.  Should I see a doctor or nurse? -- Yes. If you think you might have sleep apnea, see your doctor.  Is there a test for sleep apnea? -- Yes. If your doctor or nurse suspects you have sleep apnea, he or she might send you for a \"sleep study.\" Sleep studies can sometimes be done at home, but they are usually done in a sleep lab. For the study, you spend the night in the lab, and you are hooked up to different machines that monitor your heart rate, breathing, and other body functions. The results of the test will tell your doctor or nurse if you have the disorder.  Is there anything I can do on my own to help my sleep " "apnea? -- Yes. Here are some things that might help:  ?Stay off your back when sleeping. (This is not always practical, because people cannot control their position while asleep. Plus, it only helps some people.)  ?Lose weight, if you are overweight  ?Avoid alcohol, because it can make sleep apnea worse  How is sleep apnea treated? -- The most effective treatment for sleep apnea is a device that keeps your airway open while you sleep. Treatment with this device is called \"continuous positive airway pressure,\" or CPAP. People getting CPAP wear a face mask at night that keeps them breathing (figure 2).  If your doctor or nurse recommends a CPAP machine, try to be patient about using it. The mask might seem uncomfortable to wear at first, and the machine might seem noisy, but using the machine can really pay off. People with sleep apnea who use a CPAP machine feel more rested and generally feel better.  There is also another device that you wear in your mouth called an \"oral appliance\" or \"mandibular advancement device.\" It also helps keep your airway open while you sleep.  In rare cases, when nothing else helps, doctors recommend surgery to keep the airway open. Surgery to do this is not always effective, and even when it is, the problem can come back.  Is sleep apnea dangerous? -- It can be. People with sleep apnea do not get good-quality sleep, so they are often tired and not alert. This puts them at risk for car accidents and other types of accidents. Plus, studies show that people with sleep apnea are more likely than others to have high blood pressure, heart attacks, and other serious heart problems. In people with severe sleep apnea, getting treated (for example, with a CPAP machine) can help prevent some of these problems.     GRAPHICS  Airway in a person with sleep apnea    Normally when a person sleeps, the airway remains open, and air can pass from the nose and mouth to the lungs. In a person with sleep " apnea, parts of the throat and mouth drop into the airway and block off the flow of air. This can cause loud snoring and interrupt breathing for short periods.  Graphic 07973 Version 5.0      Continuous positive airway pressure (CPAP) for sleep apnea    The CPAP mask gently blows air into your nose while you sleep. It puts just enough pressure on your airway to keep it from closing. The mask in this picture fits over just the nose. Other CPAP devices have masks that fit over the nose and mouth.

## 2023-05-31 ENCOUNTER — OFFICE VISIT (OUTPATIENT)
Dept: FAMILY MEDICINE | Facility: CLINIC | Age: 38
End: 2023-05-31
Payer: COMMERCIAL

## 2023-05-31 VITALS
SYSTOLIC BLOOD PRESSURE: 102 MMHG | RESPIRATION RATE: 14 BRPM | OXYGEN SATURATION: 100 % | BODY MASS INDEX: 37.25 KG/M2 | DIASTOLIC BLOOD PRESSURE: 70 MMHG | HEART RATE: 72 BPM | TEMPERATURE: 98.1 F | WEIGHT: 197 LBS

## 2023-05-31 DIAGNOSIS — Z76.0 ENCOUNTER FOR MEDICATION REFILL: ICD-10-CM

## 2023-05-31 DIAGNOSIS — Z76.0 ENCOUNTER FOR MEDICATION REFILL: Primary | ICD-10-CM

## 2023-05-31 DIAGNOSIS — G89.29 OTHER CHRONIC PAIN: ICD-10-CM

## 2023-05-31 DIAGNOSIS — F33.1 MODERATE EPISODE OF RECURRENT MAJOR DEPRESSIVE DISORDER (H): Primary | ICD-10-CM

## 2023-05-31 PROCEDURE — 99213 OFFICE O/P EST LOW 20 MIN: CPT | Performed by: FAMILY MEDICINE

## 2023-05-31 RX ORDER — PAROXETINE 20 MG/1
TABLET, FILM COATED ORAL
Qty: 180 TABLET | Refills: 3 | Status: SHIPPED | OUTPATIENT
Start: 2023-05-31 | End: 2024-02-26

## 2023-05-31 RX ORDER — ACETAMINOPHEN 325 MG/1
325-650 TABLET ORAL EVERY 6 HOURS PRN
Qty: 90 TABLET | Refills: 3 | Status: SHIPPED | OUTPATIENT
Start: 2023-05-31 | End: 2023-06-06

## 2023-06-01 RX ORDER — GABAPENTIN 300 MG/1
CAPSULE ORAL
Qty: 270 CAPSULE | Refills: 3 | Status: SHIPPED | OUTPATIENT
Start: 2023-06-01 | End: 2024-08-22

## 2023-06-01 NOTE — PROGRESS NOTES
OUTPATIENT VISIT NOTE                                                   Date of Visit: 5/31/2023     Chief Complaint   Patient presents with:  Refill Request: Needed refill PAROXETINE (PAXIL) 20 MG             History of Present Illness   Najma Rodriguez is a 37 year old female requesting refill of her paxil for depression.  Has appointment next week with her primary clinic.  Depression is stable.  Also requests refill of tylenol       MEDICATIONS   Current Outpatient Medications   Medication     acetaminophen (TYLENOL) 325 MG tablet     acetaminophen (TYLENOL) 325 MG tablet     albuterol (PROAIR HFA;PROVENTIL HFA;VENTOLIN HFA) 90 mcg/actuation inhaler     cholecalciferol 125 MCG (5000 UT) CAPS     famotidine (FOR PEPCID) 10 MG tablet     famotidine (FOR PEPCID) 10 MG tablet     ferrous sulfate 325 (65 FE) MG tablet     gabapentin (NEURONTIN) 300 MG capsule     hydrocortisone 1 % lotion     ibuprofen (ADVIL,MOTRIN) 400 MG tablet     loratadine (CLARITIN) 10 mg tablet     PARoxetine (PAXIL) 20 MG tablet     prazosin (MINIPRESS) 1 MG capsule     propranoloL (INDERAL) 10 MG tablet     No current facility-administered medications for this visit.         SOCIAL HISTORY   Social History     Tobacco Use     Smoking status: Never     Smokeless tobacco: Former     Tobacco comments:     no passive exposure  / Pt reports chewing Beetle Nuts   Vaping Use     Vaping status: Never Used   Substance Use Topics     Alcohol use: No           Physical Exam   Vitals:    05/31/23 1722   BP: 102/70   BP Location: Right arm   Patient Position: Sitting   Cuff Size: Adult Large   Pulse: 72   Resp: 14   Temp: 98.1  F (36.7  C)   TempSrc: Tympanic   SpO2: 100%   Weight: 89.4 kg (197 lb)        GEN: NAD         Assessment and Plan     Moderate episode of recurrent major depressive disorder (H)  refilled  - PARoxetine (PAXIL) 20 MG tablet  Dispense: 180 tablet; Refill: 3    Encounter for medication refill  refilled  - acetaminophen (TYLENOL) 325  MG tablet  Dispense: 90 tablet; Refill: 3                   Discussed signs / symptoms that warrant urgent / emergent medical attention.     Recheck if worsening or not improving.       Henrique Connolly MD          Pertinent History     The following portions of the patient's history were reviewed and updated as appropriate: allergies, current medications, past family history, past medical history, past social history, past surgical history and problem list.

## 2023-06-02 ENCOUNTER — TELEPHONE (OUTPATIENT)
Dept: SLEEP MEDICINE | Facility: CLINIC | Age: 38
End: 2023-06-02
Payer: COMMERCIAL

## 2023-06-02 NOTE — TELEPHONE ENCOUNTER
"Patient called and left a message about needing her medication and that she was \"shaky\" without it.  Message was difficult to understand.  Called back and left a voicemail asking her what medication she needed and to return my call when able.    "

## 2023-06-06 ENCOUNTER — OFFICE VISIT (OUTPATIENT)
Dept: FAMILY MEDICINE | Facility: CLINIC | Age: 38
End: 2023-06-06
Payer: COMMERCIAL

## 2023-06-06 VITALS
HEART RATE: 76 BPM | TEMPERATURE: 98.4 F | SYSTOLIC BLOOD PRESSURE: 110 MMHG | WEIGHT: 200 LBS | BODY MASS INDEX: 39.27 KG/M2 | RESPIRATION RATE: 18 BRPM | OXYGEN SATURATION: 97 % | HEIGHT: 60 IN | DIASTOLIC BLOOD PRESSURE: 70 MMHG

## 2023-06-06 DIAGNOSIS — K80.70 CALCULUS OF GALLBLADDER AND BILE DUCT WITHOUT CHOLECYSTITIS OR OBSTRUCTION: ICD-10-CM

## 2023-06-06 DIAGNOSIS — F33.1 MODERATE EPISODE OF RECURRENT MAJOR DEPRESSIVE DISORDER (H): ICD-10-CM

## 2023-06-06 DIAGNOSIS — G89.29 OTHER CHRONIC PAIN: ICD-10-CM

## 2023-06-06 DIAGNOSIS — Z76.89 ENCOUNTER TO ESTABLISH CARE: Primary | ICD-10-CM

## 2023-06-06 PROCEDURE — 99214 OFFICE O/P EST MOD 30 MIN: CPT

## 2023-06-06 PROCEDURE — 96127 BRIEF EMOTIONAL/BEHAV ASSMT: CPT

## 2023-06-06 RX ORDER — ACETAMINOPHEN 500 MG
500-1000 TABLET ORAL EVERY 6 HOURS PRN
Qty: 100 TABLET | Refills: 0 | Status: SHIPPED | OUTPATIENT
Start: 2023-06-06 | End: 2023-07-10

## 2023-06-06 ASSESSMENT — PATIENT HEALTH QUESTIONNAIRE - PHQ9
SUM OF ALL RESPONSES TO PHQ QUESTIONS 1-9: 2
SUM OF ALL RESPONSES TO PHQ QUESTIONS 1-9: 2
10. IF YOU CHECKED OFF ANY PROBLEMS, HOW DIFFICULT HAVE THESE PROBLEMS MADE IT FOR YOU TO DO YOUR WORK, TAKE CARE OF THINGS AT HOME, OR GET ALONG WITH OTHER PEOPLE: NOT DIFFICULT AT ALL

## 2023-06-06 ASSESSMENT — ENCOUNTER SYMPTOMS: ABDOMINAL PAIN: 1

## 2023-06-06 NOTE — PROGRESS NOTES
Assessment & Plan     1. Encounter to establish care  History reviewed. Discussed results from recent ED visit.     2. Calculus of gallbladder and bile duct without cholecystitis or obstruction  Continues to have pain in the RUQ worsens after eating. No nausea/vomiting. Last seen for this in the ED on 6/4. They referred her to general surgery for possible cholecystectomy. Encouraged her to make the appointment.   - acetaminophen (TYLENOL) 500 MG tablet; Take 1-2 tablets (500-1,000 mg) by mouth every 6 hours as needed for mild pain  Dispense: 100 tablet; Refill: 0    3. Moderate episode of recurrent major depressive disorder (H)  Stable on paroxetine. PHQ-9 score:2.     4. Chronic Pain  Stable on gabapentin.     Plan to follow up with general surgery and for a preventative visit.     ALY Fernandez CNP  [facility]   Najma is a 37 year old, presenting for the following health issues:  Abdominal Pain (Intermittent eps of Rt Abd pains usually occurs after meals  x 1 year (recently seen in ED 6/4/23) ) and Establish Care (Would like to est care with continue ongoing antidepressant )        6/6/2023     3:33 PM   Additional Questions   Roomed by Yuki     Abdominal Pain     History of Present Illness       Reason for visit:  RUQ Pains  Symptom onset:  More than a month  Symptoms include:  Abdominal pains/discomfort  Symptom intensity:  Moderate  Symptom progression:  Staying the same  Had these symptoms before:  Yes  Has tried/received treatment for these symptoms:  No  What makes it worse:  After Meals  What makes it better:  N/A    She eats 2-3 servings of fruits and vegetables daily.She consumes 0 sweetened beverage(s) daily.She exercises with enough effort to increase her heart rate 10 to 19 minutes per day.  She exercises with enough effort to increase her heart rate 3 or less days per week.   She is taking medications regularly.    Today's PHQ-9         PHQ-9 Total  Score: 2    PHQ-9 Q9 Thoughts of better off dead/self-harm past 2 weeks :   Not at all    How difficult have these problems made it for you to do your work, take care of things at home, or get along with other people: Not difficult at all     Here today to establish care and with concerns of RUQ pain.    History of gallstones. Was last seen in the ED 6/4 for right upper quadrant pain. US showed cholelithiasis. She continues to have RUQ pain that worsens with eating. No nausea, vomiting, fever, chills. She was given a referral to general surgery but she has not made an appointment with them.     History of depression. Currently on paroxetine 20mg. She reports her mood as been good.    History of chronic pain. Takes gabapentin which helps but will make her sleepy during the day. Recently made a sleep eval appointment regarding daytime sleepiness, snoring.       Review of Systems   Gastrointestinal: Positive for abdominal pain.      Constitutional, HEENT, cardiovascular, pulmonary, gi and gu systems are negative, except as otherwise noted.      Objective    Ht 1.524 m (5')   Wt 90.7 kg (200 lb)   LMP 05/16/2023   BMI 39.06 kg/m    Body mass index is 39.06 kg/m .  Physical Exam   GENERAL: healthy, alert and no distress  RESP: lungs clear to auscultation - no rales, rhonchi or wheezes  CV: regular rate and rhythm, normal S1 S2, no S3 or S4, no murmur, click or rub, no peripheral edema and peripheral pulses strong  ABDOMEN: tenderness RUQ and bowel sounds normal  PSYCH: mentation appears normal, affect normal/bright

## 2023-07-06 ENCOUNTER — PATIENT OUTREACH (OUTPATIENT)
Dept: CARE COORDINATION | Facility: CLINIC | Age: 38
End: 2023-07-06
Payer: COMMERCIAL

## 2023-07-06 NOTE — PROGRESS NOTES
Clinic Care Coordination Contact     Assessment: CCRN received a call from patient today requesting assist to schedule a pre-op appt for upcoming surgery scheduled on 7/12/2023 at [facility]. Patient used to enrolled with [facility]. Patient states she only need assist to schedule a pre-op. CCRN reached out scheduling department to assist patient scheduled a pre-opappt on 7/10/2023 at 2:20pm with Dr Calero. Instructed patient to call clinic directly to schedule appt at [phone].         
Statement Selected

## 2023-07-10 ENCOUNTER — OFFICE VISIT (OUTPATIENT)
Dept: FAMILY MEDICINE | Facility: CLINIC | Age: 38
End: 2023-07-10
Payer: COMMERCIAL

## 2023-07-10 ENCOUNTER — TELEPHONE (OUTPATIENT)
Dept: FAMILY MEDICINE | Facility: CLINIC | Age: 38
End: 2023-07-10

## 2023-07-10 VITALS
BODY MASS INDEX: 37.95 KG/M2 | HEART RATE: 68 BPM | WEIGHT: 201 LBS | RESPIRATION RATE: 20 BRPM | DIASTOLIC BLOOD PRESSURE: 66 MMHG | TEMPERATURE: 98.1 F | HEIGHT: 61 IN | SYSTOLIC BLOOD PRESSURE: 98 MMHG | OXYGEN SATURATION: 99 %

## 2023-07-10 DIAGNOSIS — K80.70 CALCULUS OF GALLBLADDER AND BILE DUCT WITHOUT CHOLECYSTITIS OR OBSTRUCTION: ICD-10-CM

## 2023-07-10 DIAGNOSIS — Z01.818 PREOP GENERAL PHYSICAL EXAM: Primary | ICD-10-CM

## 2023-07-10 DIAGNOSIS — R00.0 RACING HEART BEAT: ICD-10-CM

## 2023-07-10 DIAGNOSIS — H53.8 BLURRED VISION: ICD-10-CM

## 2023-07-10 DIAGNOSIS — R42 VERTIGO: ICD-10-CM

## 2023-07-10 LAB
ATRIAL RATE - MUSE: 63 BPM
DIASTOLIC BLOOD PRESSURE - MUSE: NORMAL MMHG
HCG UR QL: NEGATIVE
INTERPRETATION ECG - MUSE: NORMAL
P AXIS - MUSE: 57 DEGREES
PR INTERVAL - MUSE: 148 MS
QRS DURATION - MUSE: 76 MS
QT - MUSE: 406 MS
QTC - MUSE: 415 MS
R AXIS - MUSE: 70 DEGREES
SYSTOLIC BLOOD PRESSURE - MUSE: NORMAL MMHG
T AXIS - MUSE: 37 DEGREES
VENTRICULAR RATE- MUSE: 63 BPM

## 2023-07-10 PROCEDURE — 93005 ELECTROCARDIOGRAM TRACING: CPT | Performed by: FAMILY MEDICINE

## 2023-07-10 PROCEDURE — 99215 OFFICE O/P EST HI 40 MIN: CPT | Performed by: FAMILY MEDICINE

## 2023-07-10 PROCEDURE — 93010 ELECTROCARDIOGRAM REPORT: CPT | Performed by: GENERAL ACUTE CARE HOSPITAL

## 2023-07-10 PROCEDURE — 81025 URINE PREGNANCY TEST: CPT | Performed by: FAMILY MEDICINE

## 2023-07-10 RX ORDER — ACETAMINOPHEN 500 MG
500-1000 TABLET ORAL EVERY 6 HOURS PRN
Qty: 100 TABLET | Refills: 3 | Status: SHIPPED | OUTPATIENT
Start: 2023-07-10 | End: 2024-01-27

## 2023-07-10 NOTE — TELEPHONE ENCOUNTER
----- Message from Jackelin Calero MD sent at 7/10/2023  4:17 PM CDT -----  Please call w results - urine pregnancy test is negative (not pregnant) and EKG (heart test) is normal. She is cleared for surgery.

## 2023-07-10 NOTE — PROGRESS NOTES
[address]  [place] [address]  Phone: [phone]  Fax: [phone]  Primary Provider: No Ref-Primary, Physician  Pre-op Performing Provider:    TERRELL GUTIERREZ  VIDEO,       PREOPERATIVE EVALUATION:  Today's date: 7/10/2023    Najma Rodriguez is a 37 year old female who presents for a preoperative evaluation.      7/10/2023     1:42 PM   Additional Questions   Roomed by Iliana CALDWELL     Surgical Information:  Surgery/Procedure: Laparoscopic Cholecystectomy  Surgery Location: [facility]  Surgeon: Dr. Torres  Surgery Date: 7/12/2023  Time of Surgery: 2pm  Where patient plans to recover: At home with family  Fax number for surgical facility: [phone]    Assessment & Plan     The proposed surgical procedure is considered INTERMEDIATE risk.    Calculus of gallbladder and bile duct without cholecystitis or obstruction  Preop general physical exam  EKG NSR today. UTP negative. Ongoing episodes for years of subjective heart racing and shortness of breath, previous cardiac event monitor showed NSR during episodes (2021). Per previous notes, symptoms have been felt to be related to anxiety. No exertional symptoms, no chest pain. Patient cleared for surgery.   - EKG 12-lead, tracing only  - HCG qualitative urine    Blurred vision  Long term symptom, has   - Adult Eye  Referral    Vertigo  Episodes for years, are bothersome to her. Referral to ENT.   - Adult ENT  Referral        Possible Sleep Apnea: Patient is scheduled for sleep study for possible sleep apnea in August and sleep clinic follow up appt in Sept       Risks and Recommendations:  The patient has the following additional risks and recommendations for perioperative complications:  Obstructive Sleep Apnea:   Possible EMA, evaluation scheduled for August     Antiplatelet or Anticoagulation Medication Instructions:   - Patient is on no antiplatelet or anticoagulation  medications.    Additional Medication Instructions:  Patient is to take all scheduled medications on the day of surgery    RECOMMENDATION:  APPROVAL GIVEN to proceed with proposed procedure, without further diagnostic evaluation.            Subjective       HPI related to upcoming procedure: symptomatic gallstones        7/10/2023     1:41 PM   Preop Questions   1. Have you ever had a heart attack or stroke? No   2. Have you ever had surgery on your heart or blood vessels, such as a stent placement, a coronary artery bypass, or surgery on an artery in your head, neck, heart, or legs? No   3. Do you have chest pain with activity? No   4. Do you have a history of  heart failure? No   5. Do you currently have a cold, bronchitis or symptoms of other infection? No   6. Do you have a cough, shortness of breath, or wheezing? No   7. Do you or anyone in your family have previous history of blood clots? No   8. Do you or does anyone in your family have a serious bleeding problem such as prolonged bleeding following surgeries or cuts? No   9. Have you ever had problems with anemia or been told to take iron pills? YES - patient reports long history of anemia related to heavy periods, recent CBC 6/4/23 hgb 13.2 wnl, several checks in past few years wnl   10. Have you had any abnormal blood loss such as black, tarry or bloody stools, or abnormal vaginal bleeding? No   11. Have you ever had a blood transfusion? No   12. Are you willing to have a blood transfusion if it is medically needed before, during, or after your surgery? Yes   13. Have you or any of your relatives ever had problems with anesthesia? No   14. Do you have sleep apnea, excessive snoring or daytime drowsiness? YES - daytime drowsiness, was referred to sleep clinic, sleep study scheduled 8/25/23, clinic 9/7/23   14a. Do you have a CPAP machine? No   15. Do you have any artifical heart valves or other implanted medical devices like a pacemaker, defibrillator, or  continuous glucose monitor? No   16. Do you have artificial joints? No   17. Are you allergic to latex? No   18. Is there any chance that you may be pregnant? No       Health Care Directive:  Patient does not have a Health Care Directive or Living Will: unable to discuss today    Preoperative Review of :   reviewed - no record of controlled substances prescribed.          Review of Systems  CONSTITUTIONAL: NEGATIVE for fever, chills, change in weight  INTEGUMENTARY/SKIN: NEGATIVE for worrisome rashes, moles or lesions  EYES: NEGATIVE for irritation  ENT/MOUTH: NEGATIVE for ear, mouth and throat problems  RESP: NEGATIVE for significant cough or SOB-see below  CV: NEGATIVE for chest pain, palpitations or peripheral edema - see below- racing heart  GI: NEGATIVE for nausea, abdominal pain, heartburn, or change in bowel habits  : NEGATIVE for frequency, dysuria, or hematuria  MUSCULOSKELETAL: NEGATIVE for significant arthralgias or myalgia  NEURO: NEGATIVE for weakness or paresthesias. Feels dizzy at times - feels like room is spinning, makes her feel tired, feels like a child that has spun too many times and lost balance. Has happened for years, [place]. At most twice monthly, sometimes mild, sometimes more severe.   ENDOCRINE: NEGATIVE for temperature intolerance, skin/hair changes  HEME: NEGATIVE for bleeding problems  PSYCHIATRIC: NEGATIVE for changes in mood or affect    Blurry vision since pandemic. Years ago was prescribed rx for vision and caused eye strain and headaches, so never returned to eye doctor.     Short of breath and sensation of racing heart at times, can last up to 30 minutes. Last happened yesterday, on average twice weekly. Symptoms have been occurring for years and previously evaluated w cardiac event monitor.   3 day event monitor normal in 3/2021 (patient had 6 symptomatic episodes of racing heart and shortness of breath during monitoring)  Impression:    Normal multi day  monitor    Symptoms of racing heart rhythm, shortness of breath and dizziness correlate to sinus rhythm in the 80s without ectopy  TSH, CBC, CMP unremarkable 3/30/23    Patient Active Problem List    Diagnosis Date Noted    Daytime sleepiness 2023     Priority: Medium    Moderate episode of recurrent major depressive disorder (H)      Priority: Medium    Calcified granuloma of lung (H) 2018     Priority: Medium     Overview:   Left lung.         Staring Spells (Reported)      Priority: Medium    Vitamin D deficiency      Priority: Medium    Borderline personality disorder (H)      Priority: Medium    Chronic Tension-type Headache      Priority: Medium    Anxiety Disorder Due To General Medical Condition With Panic Attacks      Priority: Medium    Chronic Pain      Priority: Medium    Post-traumatic Stress Disorder      Priority: Medium    Latent Tuberculosis      Priority: Medium    Obsessive compulsive disorder 2015     Priority: Medium      Past Medical History:   Diagnosis Date    Anxiety disorder     Borderline personality disorder (H)     Chronic tension type headache     Daytime sleepiness 3/30/2023    Depression     Functional murmur     OCD (obsessive compulsive disorder)     Post traumatic stress disorder (PTSD)     Vitamin D deficiency      Past Surgical History:   Procedure Laterality Date     SECTION       Current Outpatient Medications   Medication Sig Dispense Refill    acetaminophen (TYLENOL) 500 MG tablet Take 1-2 tablets (500-1,000 mg) by mouth every 6 hours as needed for mild pain 100 tablet 3    albuterol (PROAIR HFA;PROVENTIL HFA;VENTOLIN HFA) 90 mcg/actuation inhaler [ALBUTEROL (PROAIR HFA;PROVENTIL HFA;VENTOLIN HFA) 90 MCG/ACTUATION INHALER] Inhale 2 puffs every 6 (six) hours as needed for wheezing. 2 Inhaler 3    cholecalciferol 125 MCG (5000 UT) CAPS [CHOLECALCIFEROL, VITAMIN D3, 125 MCG (5,000 UNIT) CAPSULE] TAKE 1 CAPSULE BY MOUTH DAILY 90 capsule 3    gabapentin  "(NEURONTIN) 300 MG capsule [GABAPENTIN (NEURONTIN) 300 MG CAPSULE] TAKE 1 CAPSULE(300 MG) BY MOUTH THREE TIMES DAILY 270 capsule 3    PARoxetine (PAXIL) 20 MG tablet [PAROXETINE (PAXIL) 20 MG TABLET] TAKE 2 TABLETS(40 MG) BY MOUTH EVERY MORNING 180 tablet 3       Allergies   Allergen Reactions    No Known Drug Allergy Unknown        Social History     Tobacco Use    Smoking status: Never     Passive exposure: Never    Smokeless tobacco: Former    Tobacco comments:     no passive exposure  / Pt reports chewing Beet[employer] Nuts   Substance Use Topics    Alcohol use: No       History   Drug Use No         Objective     BP 98/66   Pulse 68   Temp 98.1  F (36.7  C) (Oral)   Resp 20   Ht 1.549 m (5' 1\")   Wt 91.2 kg (201 lb)   LMP 07/08/2023   SpO2 99%   BMI 37.98 kg/m      Physical Exam    GENERAL APPEARANCE: healthy, alert and no distress     EYES: EOMI, PERRL     HENT: ear canals and TM's normal and nose and mouth without ulcers or lesions     NECK: no adenopathy, no asymmetry, masses     RESP: lungs clear to auscultation - no rales, rhonchi or wheezes     CV: regular rates and rhythm, normal S1 S2, no S3 or S4 and no murmur, click or rub     ABDOMEN:  soft, nontender     MS: extremities normal- no gross deformities noted, no evidence of inflammation in joints, FROM in all extremities.     SKIN: no suspicious lesions or rashes     NEURO: alert and oriented, grossly nonfocal     PSYCH: mentation appears normal. and affect normal/bright     LYMPHATICS: No cervical adenopathy    Recent Labs   Lab Test 03/30/23  1505 11/02/21  1819   HGB 12.4  --      --     137   POTASSIUM 4.0 4.1   CR 0.64 0.72   A1C  --  5.3      CBC 6/4/23 [facility] - hgb 13.2    Diagnostics:  Labs -UPT negative.    EKG: appears normal, NSR, normal axis, normal intervals, no acute ST/T changes c/w ischemia, no LVH by voltage criteria, unchanged from previous tracings    Revised Cardiac Risk Index (RCRI):  The patient has the following " serious cardiovascular risks for perioperative complications:   - No serious cardiac risks = 0 points     RCRI Interpretation: 0 points: Class I (very low risk - 0.4% complication rate)    40 minutes spent on day of encounter interviewing patient through , reviewing chart, examining patient, discussing plan with patient, and documenting visit       Signed Electronically by: Jackelin Calero MD  Copy of this evaluation report is provided to requesting physician.       intermittent

## 2023-07-10 NOTE — TELEPHONE ENCOUNTER
Called patient with assistance of an  in attempt to relay provider test result message and recommendation below.  However, no answer. Message left on vm with clinic number provide.    If patient calls back, please relay provider message below.    Thank you      EDITH GoddardN, RN  [facility] HospitalJackelin piedra MD  [facility]/Ob Support Pool  Please call w results - urine pregnancy test is negative (not pregnant) and EKG (heart test) is normal. She is cleared for surgery.

## 2023-07-11 NOTE — TELEPHONE ENCOUNTER
Patient returns call. Writer relay RN/provider's message below.     Caller verbalizes understanding and has no further questions.     Rosa Elena Bran CMA ,  [facility]  July 11, 2023 11:26 AM

## 2023-08-07 ENCOUNTER — OFFICE VISIT (OUTPATIENT)
Dept: FAMILY MEDICINE | Facility: CLINIC | Age: 38
End: 2023-08-07
Payer: COMMERCIAL

## 2023-08-07 VITALS
SYSTOLIC BLOOD PRESSURE: 95 MMHG | WEIGHT: 201 LBS | HEART RATE: 74 BPM | BODY MASS INDEX: 37.95 KG/M2 | OXYGEN SATURATION: 99 % | HEIGHT: 61 IN | TEMPERATURE: 99.1 F | DIASTOLIC BLOOD PRESSURE: 67 MMHG | RESPIRATION RATE: 20 BRPM

## 2023-08-07 DIAGNOSIS — R07.81 RIB PAIN ON RIGHT SIDE: Primary | ICD-10-CM

## 2023-08-07 DIAGNOSIS — R52 PAIN: ICD-10-CM

## 2023-08-07 PROCEDURE — 99214 OFFICE O/P EST MOD 30 MIN: CPT | Performed by: FAMILY MEDICINE

## 2023-08-07 RX ORDER — GABAPENTIN 100 MG/1
100 CAPSULE ORAL 3 TIMES DAILY
Qty: 270 CAPSULE | Refills: 3 | Status: SHIPPED | OUTPATIENT
Start: 2023-08-07 | End: 2023-09-18

## 2023-08-07 ASSESSMENT — ENCOUNTER SYMPTOMS: BACK PAIN: 1
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
## 2023-08-09 ENCOUNTER — THERAPY VISIT (OUTPATIENT)
Dept: PHYSICAL THERAPY | Facility: CLINIC | Age: 38
End: 2023-08-09
Attending: FAMILY MEDICINE
Payer: COMMERCIAL

## 2023-08-09 DIAGNOSIS — R07.81 RIB PAIN ON RIGHT SIDE: ICD-10-CM

## 2023-08-09 PROCEDURE — 97110 THERAPEUTIC EXERCISES: CPT | Mod: GP | Performed by: PHYSICAL THERAPIST

## 2023-08-09 PROCEDURE — 97161 PT EVAL LOW COMPLEX 20 MIN: CPT | Mod: GP | Performed by: PHYSICAL THERAPIST

## 2023-08-09 PROCEDURE — 97140 MANUAL THERAPY 1/> REGIONS: CPT | Mod: GP | Performed by: PHYSICAL THERAPIST
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
## 2023-08-14 ENCOUNTER — TRANSFERRED RECORDS (OUTPATIENT)
Dept: HEALTH INFORMATION MANAGEMENT | Facility: CLINIC | Age: 38
End: 2023-08-14
Payer: COMMERCIAL

## 2023-08-24 ASSESSMENT — SLEEP AND FATIGUE QUESTIONNAIRES
HOW LIKELY ARE YOU TO NOD OFF OR FALL ASLEEP WHILE WATCHING TV: WOULD NEVER DOZE
HOW LIKELY ARE YOU TO NOD OFF OR FALL ASLEEP WHILE SITTING AND READING: WOULD NEVER DOZE
HOW LIKELY ARE YOU TO NOD OFF OR FALL ASLEEP WHEN YOU ARE A PASSENGER IN A CAR FOR AN HOUR WITHOUT A BREAK: WOULD NEVER DOZE
HOW LIKELY ARE YOU TO NOD OFF OR FALL ASLEEP WHILE SITTING QUIETLY AFTER LUNCH WITHOUT ALCOHOL: WOULD NEVER DOZE
HOW LIKELY ARE YOU TO NOD OFF OR FALL ASLEEP IN A CAR, WHILE STOPPED FOR A FEW MINUTES IN TRAFFIC: WOULD NEVER DOZE
HOW LIKELY ARE YOU TO NOD OFF OR FALL ASLEEP WHILE LYING DOWN TO REST IN THE AFTERNOON WHEN CIRCUMSTANCES PERMIT: WOULD NEVER DOZE
HOW LIKELY ARE YOU TO NOD OFF OR FALL ASLEEP WHILE SITTING AND TALKING TO SOMEONE: WOULD NEVER DOZE
HOW LIKELY ARE YOU TO NOD OFF OR FALL ASLEEP WHILE SITTING INACTIVE IN A PUBLIC PLACE: WOULD NEVER DOZE

## 2023-08-25 ENCOUNTER — THERAPY VISIT (OUTPATIENT)
Dept: SLEEP MEDICINE | Facility: CLINIC | Age: 38
End: 2023-08-25
Attending: INTERNAL MEDICINE
Payer: COMMERCIAL

## 2023-08-25 DIAGNOSIS — R40.0 DAYTIME SLEEPINESS: ICD-10-CM

## 2023-08-25 DIAGNOSIS — R06.83 SNORING: ICD-10-CM

## 2023-08-25 DIAGNOSIS — E66.01 MORBID OBESITY (H): ICD-10-CM

## 2023-08-25 DIAGNOSIS — G47.59 OTHER PARASOMNIA: ICD-10-CM

## 2023-08-25 DIAGNOSIS — R51.9 SLEEP RELATED HEADACHES: ICD-10-CM

## 2023-08-25 PROCEDURE — 95810 POLYSOM 6/> YRS 4/> PARAM: CPT | Performed by: INTERNAL MEDICINE

## 2023-08-26 NOTE — PATIENT INSTRUCTIONS
[place] [facility]    1. Your sleep study will be reviewed by a sleep physician within the next few days.     2. Please follow up in the sleep clinic as scheduled, or, make an appointment with your sleep provider to be seen within two weeks to discuss the results of the sleep study.    3. If you have any questions or problems with your treatment plan, please contact your sleep clinic provider at [phone] to further manage your condition.    4. Please review your attached medication list, and, at your follow-up appointment advise your sleep clinic provider about any changes.    5. Go to [email or web address] for more information about your sleep problems.    CATRINA ASCENCIO, RPSGT  August 26, 2023        What Type Of Note Output Would You Prefer (Optional)?: Standard Output How Severe Is Your Rash?: moderate Is This A New Presentation, Or A Follow-Up?: Rash

## 2023-09-01 LAB — SLPCOMP: NORMAL

## 2023-09-07 ENCOUNTER — OFFICE VISIT (OUTPATIENT)
Dept: SLEEP MEDICINE | Facility: CLINIC | Age: 38
End: 2023-09-07
Payer: COMMERCIAL

## 2023-09-07 VITALS
OXYGEN SATURATION: 100 % | BODY MASS INDEX: 38.38 KG/M2 | DIASTOLIC BLOOD PRESSURE: 75 MMHG | HEART RATE: 58 BPM | WEIGHT: 203.13 LBS | SYSTOLIC BLOOD PRESSURE: 110 MMHG

## 2023-09-07 DIAGNOSIS — E66.9 OBESITY (BMI 35.0-39.9 WITHOUT COMORBIDITY): ICD-10-CM

## 2023-09-07 DIAGNOSIS — R40.0 DAYTIME SLEEPINESS: ICD-10-CM

## 2023-09-07 DIAGNOSIS — G47.33 OSA (OBSTRUCTIVE SLEEP APNEA): Primary | ICD-10-CM

## 2023-09-07 PROCEDURE — 99214 OFFICE O/P EST MOD 30 MIN: CPT | Performed by: INTERNAL MEDICINE

## 2023-09-07 NOTE — PROGRESS NOTES
Additional 15 minutes on the date of service was spent performing the following:    -Preparing to see the patient   -Ordering medications, tests, or procedures   -Documenting clinical information in the electronic or other health record     Thank you for the opportunity to participate in the care of Najma Rodriguez.     All the information was obtained and communicated with the help of the interpretor.    She is a 37 year old  female patient who comes to the sleep medicine clinic for review of sleep study results. The patient had a sleep study performed on 08/25/2023 (AHI= 8.9).  Patient's respiratory events were worse in supine REM sleep.  The patient was also found to have borderline periodic limb movement of sleep.  REM sleep without atonia was appreciated as well.    Assessment and Plan:  In summary Najma Rodriguez is a 37 year old year old female here for review of sleep study results.  1. Obstructive Sleep Apnea/hypersomnia  We had an extensive conversation to review the results of her sleep study and to  her on the importance of treating sleep apnea. We discussed the options of treatment with oral appliance versus CPAP. Patient decided to proceed with CPAP. She will start using the device as soon as she receives it with the intention to use if for the entire night. We discussed some tips to increase PAP tolerance as well as the normal curve of adaptation. CPAP is going to provide improved respiratory function during the night but it can cause some sleep disruption that tends to improve with continuous usage. She should return to the clinic in 7 weeks to review compliance and efficacy monitoring. Since the patient does not have any preference, we will use [employer] as the [employer] medical equipment company.   - COMPREHENSIVE DME    2.Obesity (BMI 35.0-39.9 without comorbidity)  Healthy weigh management is recommended as part of the long term goal to improve EMA. I will give the patient a hand out on  the topic in the AVS.    LETTY:  LETTY Total Score: 26  Total score - [place]: 0 (8/24/2023  2:41 PM)    Patient Active Problem List   Diagnosis    Staring Spells (Reported)    Vitamin D deficiency    Moderate episode of recurrent major depressive disorder (H)    Borderline personality disorder (H)    Chronic Tension-type Headache    Anxiety Disorder Due To General Medical Condition With Panic Attacks    Chronic Pain    Post-traumatic Stress Disorder    Latent Tuberculosis    Obsessive compulsive disorder    Calcified granuloma of lung (H)    Daytime sleepiness    Rib pain on right side       Current Outpatient Medications   Medication Sig Dispense Refill    acetaminophen (TYLENOL) 500 MG tablet Take 1-2 tablets (500-1,000 mg) by mouth every 6 hours as needed for mild pain 100 tablet 3    albuterol (PROAIR HFA;PROVENTIL HFA;VENTOLIN HFA) 90 mcg/actuation inhaler [ALBUTEROL (PROAIR HFA;PROVENTIL HFA;VENTOLIN HFA) 90 MCG/ACTUATION INHALER] Inhale 2 puffs every 6 (six) hours as needed for wheezing. 2 Inhaler 3    cholecalciferol 125 MCG (5000 UT) CAPS [CHOLECALCIFEROL, VITAMIN D3, 125 MCG (5,000 UNIT) CAPSULE] TAKE 1 CAPSULE BY MOUTH DAILY 90 capsule 3    gabapentin (NEURONTIN) 100 MG capsule Take 1 capsule (100 mg) by mouth 3 times daily (Take with 300mg capsule) 270 capsule 3    PARoxetine (PAXIL) 20 MG tablet [PAROXETINE (PAXIL) 20 MG TABLET] TAKE 2 TABLETS(40 MG) BY MOUTH EVERY MORNING 180 tablet 3    gabapentin (NEURONTIN) 300 MG capsule [GABAPENTIN (NEURONTIN) 300 MG CAPSULE] TAKE 1 CAPSULE(300 MG) BY MOUTH THREE TIMES DAILY (Patient not taking: Reported on 9/7/2023) 270 capsule 3       Allergies   Allergen Reactions    No Known Drug Allergy Unknown       Physical Exam:  /75   Pulse 58   Wt 92.1 kg (203 lb 2 oz)   LMP 08/07/2023   SpO2 100%   BMI 38.38 kg/m    BMI:Body mass index is 38.38 kg/m .   GEN: NAD,   Head: Normocephalic.  EYES:  EOMI  Psych: normal mood, normal affect     Labs/Studies:  - We  reviewed the results of the overnight study as described on the HPI.       Patient verbalized understanding of these issues, agrees with the plan and all questions were answered today. Patient was given an opportuntity to voice any other symptoms or concerns not listed above. Patient did not have any other symptoms or concerns.      Alvin Goodman DO  Board Certified in Internal Medicine and Sleep Medicine      (Note created with Dragon voice recognition and unintended spelling errors and word substitutions may occur)

## 2023-09-07 NOTE — NURSING NOTE
"Chief Complaint   Patient presents with    Study Results       Initial /75   Pulse 58   Wt 92.1 kg (203 lb 2 oz)   LMP 08/07/2023   SpO2 100%   BMI 38.38 kg/m   Estimated body mass index is 38.38 kg/m  as calculated from the following:    Height as of 8/7/23: 1.549 m (5' 1\").    Weight as of this encounter: 92.1 kg (203 lb 2 oz).    Medication Reconciliation: complete    Neck circumference:     DME:     BEAU Khanna  [facility]      "

## 2023-09-07 NOTE — PATIENT INSTRUCTIONS
Your Body mass index is 38.38 kg/m .  Weight management is a personal decision.  If you are interested in exploring weight loss strategies, the following discussion covers the approaches that may be successful. Body mass index (BMI) is one way to tell whether you are at a healthy weight, overweight, or obese. It measures your weight in relation to your height.  A BMI of 18.5 to 24.9 is in the healthy range. A person with a BMI of 25 to 29.9 is considered overweight, and someone with a BMI of 30 or greater is considered obese. More than two-thirds of American adults are considered overweight or obese.  Being overweight or obese increases the risk for further weight gain. Excess weight may lead to heart disease and diabetes.  Creating and following plans for healthy eating and physical activity may help you improve your health.  Weight control is part of healthy lifestyle and includes exercise, emotional health, and healthy eating habits. Careful eating habits lifelong are the mainstay of weight control. Though there are significant health benefits from weight loss, long-term weight loss with diet alone may be very difficult to achieve- studies show long-term success with dietary management in less than 10% of people. Attaining a healthy weight may be especially difficult to achieve in those with severe obesity. In some cases, medications, devices and surgical management might be considered.  What can you do?  If you are overweight or obese and are interested in methods for weight loss, you should discuss this with your provider.   Consider reducing daily calorie intake by 500 calories.   Keep a food journal.   Avoiding skipping meals, consider cutting portions instead.    Diet combined with exercise helps maintain muscle while optimizing fat loss. Strength training is particularly important for building and maintaining muscle mass. Exercise helps reduce stress, increase energy, and improves fitness. Increasing  exercise without diet control, however, may not burn enough calories to loose weight.     Start walking three days a week 10-20 minutes at a time  Work towards walking thirty minutes five days a week   Eventually, increase the speed of your walking for 1-2 minutes at time    In addition, we recommend that you review healthy lifestyles and methods for weight loss available through the National Institutes of Health patient information sites:  http://win.niddk.nih.gov/publications/index.htm    And look into health and wellness programs that may be available through your health insurance provider, employer, local community center, or [employer].

## 2023-09-18 ENCOUNTER — OFFICE VISIT (OUTPATIENT)
Dept: FAMILY MEDICINE | Facility: CLINIC | Age: 38
End: 2023-09-18
Payer: COMMERCIAL

## 2023-09-18 VITALS
RESPIRATION RATE: 18 BRPM | HEIGHT: 61 IN | BODY MASS INDEX: 38.09 KG/M2 | WEIGHT: 201.75 LBS | TEMPERATURE: 98.9 F | DIASTOLIC BLOOD PRESSURE: 64 MMHG | SYSTOLIC BLOOD PRESSURE: 110 MMHG | OXYGEN SATURATION: 98 % | HEART RATE: 74 BPM

## 2023-09-18 DIAGNOSIS — G47.33 OBSTRUCTIVE SLEEP APNEA SYNDROME: ICD-10-CM

## 2023-09-18 DIAGNOSIS — F06.4 ANXIETY DISORDER DUE TO MEDICAL CONDITION: ICD-10-CM

## 2023-09-18 DIAGNOSIS — F33.1 MODERATE EPISODE OF RECURRENT MAJOR DEPRESSIVE DISORDER (H): ICD-10-CM

## 2023-09-18 DIAGNOSIS — R40.0 DAYTIME SLEEPINESS: ICD-10-CM

## 2023-09-18 DIAGNOSIS — R06.02 SHORTNESS OF BREATH: ICD-10-CM

## 2023-09-18 DIAGNOSIS — F43.10 POSTTRAUMATIC STRESS DISORDER: Primary | ICD-10-CM

## 2023-09-18 PROCEDURE — 99214 OFFICE O/P EST MOD 30 MIN: CPT | Performed by: FAMILY MEDICINE

## 2023-09-18 NOTE — PATIENT INSTRUCTIONS
-Thank you for choosing the [facility].  -It was a pleasure to see you today.  -Please take a look at the information below for more specific details regarding the treatment plan and recommendations.  -In this after visit summary is a list of your medications and specific instructions.  Please review this carefully as there may be changes made to your medication list.  -If there are any particular questions or concerns, please feel free to reach out to Dr. Benavides.  -If any labs have been completed, we will reach out to you about results.  If the results are normal or not concerning, a letter or thredUPhart message will be sent to you.  If any follow-up is needed, either Dr. Benavides or the nurse will give you a call.  If you have not heard regarding results after 2 weeks, please reach out to the clinic.    Patient Instructions:    -Please continue the medications as prescribed.  -There are refills available and you may pick these up at the pharmacy when ready.  -The daytime sleepiness is likely related to the gabapentin medication you are on, though you should also follow the recommendations from the sleep doctor.  -Continue to follow the recommendations from your previous doctors.  -Follow the recommendations from the sleep doctor you saw recently.  -Monitor closely for any medication issues or side effects.    -Please schedule an appointment for a nurse only appointment to complete the influenza vaccine when you are ready.  -Please schedule lab only appointment to complete labs when you are ready.  It is best if you come in fasting (do not eat or drink anything for 12 hours prior to the test)    Please seek immediate medical attention (go to the emergency room or urgent care) for the following reasons: worsening symptoms, or any concerning changes.      --------------------------------------------------------------------------------------------------------------------    -We are always looking for  ways to improve.  You may be selected to receive a survey regarding your visit today.  We encourage you to complete the survey and provide specific, constructive feedback to help us improve our processes.  Thank you for your time!  -Please review the contact information listed on the after visit summary and in the electronic chart.  Below is the phone number that we have on file.  If there are any changes that are needed to be made, please reach out to the clinic.  [phone] (home)

## 2023-09-18 NOTE — PROGRESS NOTES
OFFICE VISIT    Assessment/Plan:     Patient Instructions:    -Please continue the medications as prescribed.  -There are refills available and you may pick these up at the pharmacy when ready.  -The daytime sleepiness is likely related to the gabapentin medication you are on, though you should also follow the recommendations from the sleep doctor.  -Continue to follow the recommendations from your previous doctors.  -Follow the recommendations from the sleep doctor you saw recently.  -Monitor closely for any medication issues or side effects.    -Please schedule an appointment for a nurse only appointment to complete the influenza vaccine when you are ready.  -Please schedule lab only appointment to complete labs when you are ready.  It is best if you come in fasting (do not eat or drink anything for 12 hours prior to the test)    Please seek immediate medical attention (go to the emergency room or urgent care) for the following reasons: worsening symptoms, or any concerning changes.      Najma was seen today for establish care.  Diagnoses and all orders for this visit:    Post-traumatic Stress Disorder  Moderate episode of recurrent major depressive disorder (H)  Anxiety Disorder Due To General Medical Condition With Panic Attacks: stable on paroxetine. Patient also taking gabapentin. Continue mediations as prescribed. Patient states that she does not need refills at this time.     Shortness of breath: stable on albuterol. No refills needed. Continue medication and monitor.     Daytime sleepiness  Obstructive sleep apnea syndrome: encouraged to follow with sleep medicine doctor and follow their recommendations.         Return in about 3 months (around 12/18/2023) for Medication follow up.    The diagnoses, treatment options, risk, benefits, and recommendations were reviewed with patient/guardian.  Questions were answered to patient's/guardian satisfaction.  Red flag signs were reviewed.  Patient/guardian is in  agreement with above plan.      Subjective: 37 year old female with history of chronic tension type headache, chronic pain, depression, PTSD, OCD, anxiety, panic disorder, borderline personality disorder, latent tuberculosis, EMA (follow with sleep medicine) who presents to clinic for the following complaints:   Patient presents with:  Establish Care    She doesn't have a family doctor, and she has her kids, too.  Patient would like to establish cares.    Overall, she states that she is doing well.  She does not have any particular questions or concerns.  She denies any significant issues or side effects with any of the medications that she takes.    She feels sleepy all the time due to the gabapentin, though is able to function normally.  She would like to continue on this dose.  Medication was prescribed for the pain.  Has a BMI of 38.12.    Paroxetine: helps with anxiety and depression.  Working well for her. Denies SI/HI. Contracts for safety.     Breathing has been okay.  Reviewed usage of the albuterol.    Patient takes vitamin D on a regular basis.  Uses Tylenol as needed for any discomforts.    Of note, patient had a laparoscopic cholecystectomy completed on 7/12/2023.  She had complained of right rib pain and was seen on 8/7/2023.  Patient was referred to physical therapy and had gabapentin dose increased, though it appears that patient has not been taking the higher dose.  She is currently taking gabapentin 300 mg 3 times daily.    HM due was reviewed with patient/parent.  Recommendations, risk, benefits were reviewed.  Accepted recommendations were ordered.  Otherwise, patient/parent declined.    Health Maintenance Due   Topic Date Due    ANNUAL REVIEW OF HM ORDERS  Never done    HEPATITIS B IMMUNIZATION (2 of 3 - 19+ 3-dose series) 07/05/2007    COVID-19 Vaccine (3 - Moderna series) 10/18/2021    YEARLY PREVENTIVE VISIT  11/02/2022    INFLUENZA VACCINE (1) 09/01/2023     It is reported from [facility]  "care system the patient had hep B vaccination completed on 6/7/2007. She got the second dose at some point. It appears that patient is due for the third hepatitis B vaccine. Declined Hep B vaccine today.    A professional [place] telephone  was utilized for the office visit.     The 10 point review of system is negative except as stated in the HPI.    Allergies were reviewed and updated.    Objective:   /64   Pulse 74   Temp 98.9  F (37.2  C) (Oral)   Resp 18   Ht 1.549 m (5' 1\")   Wt 91.5 kg (201 lb 12 oz)   LMP 09/07/2023   SpO2 98%   BMI 38.12 kg/m    General: Active, alert, nontoxic-appearing.  No acute distress.  HEENT: Normocephalic, atraumatic.  Pupils are equal and round.  Sclera is clear.  Normal external ears. Nares patent.  Moist mucous membranes.    Cardiac: RRR.  S1, S2 present.  No murmurs, rubs, or gallops.  Respiratory/chest: Clear to auscultation bilaterally.  No wheezes, rales, rhonchi.  Breathing is not labored.  No accessory muscle usage.  Extremities: Voluntary movements intact.  Integumentary: No concerning rash or skin changes appreciated.        Myles Benavides MD  [facility] [place] [address]  Phone: [phone]  Fax: [phone]    9/21/2023  1:29 PM          Current Outpatient Medications   Medication    acetaminophen (TYLENOL) 500 MG tablet    albuterol (PROAIR HFA;PROVENTIL HFA;VENTOLIN HFA) 90 mcg/actuation inhaler    cholecalciferol 125 MCG (5000 UT) CAPS    gabapentin (NEURONTIN) 300 MG capsule    PARoxetine (PAXIL) 20 MG tablet     No current facility-administered medications for this visit.       Allergies   Allergen Reactions    No Known Drug Allergy Unknown       Patient Active Problem List    Diagnosis Date Noted    Obstructive sleep apnea syndrome 09/21/2023     Priority: Medium    Shortness of breath 09/21/2023     Priority: Medium    Rib pain on right side 08/09/2023     Priority: Medium    Daytime sleepiness " 2023     Priority: Medium    Moderate episode of recurrent major depressive disorder (H)      Priority: Medium    Calcified granuloma of lung (H) 2018     Priority: Medium     Overview:   Left lung.         Staring Spells (Reported)      Priority: Medium    Vitamin D deficiency      Priority: Medium    Borderline personality disorder (H)      Priority: Medium    Chronic Tension-type Headache      Priority: Medium    Anxiety Disorder Due To General Medical Condition With Panic Attacks      Priority: Medium    Chronic Pain      Priority: Medium    Post-traumatic Stress Disorder      Priority: Medium    Latent Tuberculosis      Priority: Medium    Obsessive compulsive disorder 2015     Priority: Medium       Family History   Problem Relation Age of Onset    Cerebrovascular Disease Sister     Mental Illness Daughter         cutting and suicidal ideation       Past Surgical History:   Procedure Laterality Date     SECTION          Social History     Socioeconomic History    Marital status: Single     Spouse name: Not on file    Number of children: 3    Years of education: Not on file    Highest education level: Not on file   Occupational History    Not on file   Tobacco Use    Smoking status: Never     Passive exposure: Never    Smokeless tobacco: Former    Tobacco comments:     no passive exposure  / Pt reports chewing Beetle Nuts   Vaping Use    Vaping Use: Never used   Substance and Sexual Activity    Alcohol use: No    Drug use: No    Sexual activity: Not Currently     Partners: Male   Other Topics Concern    Not on file   Social History Narrative    2021:        - [place] refugee. Born in [facility]. [place] a [place]    - 3 children;  from ex- since .        FAMILY HISTORY    - Parents and sister  in [facility]    - Orphaned at 11yo    - 2 children born in [place], 1 child born here        LIVING SITUATION    - Lives with 3 children    -  Household of 4        LANGUAGE(S) SPOKEN    - [place]    - English        EMPLOYMENT    - Past employment: Previously received SSI benefits for approximately 4 years    - Current employment: Working for [employer] as a  (6 hours/day, $15/hour, summers off)     Social Determinants of Health     Financial Resource Strain: High Risk (7/20/2021)    Overall Financial Resource Strain (CARDIA)     Difficulty of Paying Living Expenses: Hard   Food Insecurity: Food Insecurity Present (7/20/2021)    Hunger Vital Sign     Worried About Running Out of Food in the Last Year: Sometimes true     Ran Out of Food in the Last Year: Sometimes true   Transportation Needs: No Transportation Needs (7/20/2021)    PRAPARE - Transportation     Lack of Transportation (Medical): No     Lack of Transportation (Non-Medical): No   Physical Activity: Insufficiently Active (7/20/2021)    Exercise Vital Sign     Days of Exercise per Week: 3 days     Minutes of Exercise per Session: 30 min   Stress: Stress Concern Present (7/20/2021)    [place] [employer] - Occupational Stress Questionnaire     Feeling of Stress : Rather much   Social Connections: Moderately Isolated (7/20/2021)    Social Connection and Isolation Panel [NHANES]     Frequency of Communication with Friends and Family: More than three times a week     Frequency of Social Gatherings with Friends and Family: More than three times a week     Attends Latter-day Services: More than 4 times per year     Active Member of Clubs or Organizations: No     Attends Club or Organization Meetings: Never     Marital Status:    Interpersonal Safety: Not At Risk (7/20/2021)    Humiliation, Afraid, Rape, and Kick questionnaire     Fear of Current or Ex-Partner: No     Emotionally Abused: No     Physically Abused: No     Sexually Abused: No   Housing Stability: Low Risk  (7/20/2021)    Housing Stability Vital Sign     Unable to Pay for Housing in the Last Year: No      Number of Places Lived in the Last Year: 1     Unstable Housing in the Last Year: No

## 2023-09-21 PROBLEM — G47.33 OBSTRUCTIVE SLEEP APNEA SYNDROME: Status: ACTIVE | Noted: 2023-09-21

## 2023-09-21 PROBLEM — R06.02 SHORTNESS OF BREATH: Status: ACTIVE | Noted: 2023-09-21

## 2023-10-03 ENCOUNTER — DOCUMENTATION ONLY (OUTPATIENT)
Dept: SLEEP MEDICINE | Facility: CLINIC | Age: 38
End: 2023-10-03
Payer: COMMERCIAL

## 2023-10-03 DIAGNOSIS — G47.14 HYPERSOMNIA DUE TO MEDICAL CONDITION: ICD-10-CM

## 2023-10-03 DIAGNOSIS — G47.33 OSA (OBSTRUCTIVE SLEEP APNEA): Primary | ICD-10-CM

## 2023-10-03 NOTE — PROGRESS NOTES
Patient was offered choice of vendor and chose [facility].  Patient Najma Rodriguez was set up at [facility] on October 3, 2023. Patient received a RESMED0 Airsense 10 Pressures were set at  5-15 cm H2O.   Patient s ramp is 4 cm H2O for AUTO and FLEX/EPR is 2.  Patient received a RESPIRONICS Mask name: [id number]  Nasal mask size FITPACK, heated tubing and heated humidifier.  Patient has the following compliance requirements: usage only.    Giovanna Hyde

## 2024-01-09 ENCOUNTER — OFFICE VISIT (OUTPATIENT)
Dept: FAMILY MEDICINE | Facility: CLINIC | Age: 39
End: 2024-01-09
Payer: COMMERCIAL

## 2024-01-09 VITALS
SYSTOLIC BLOOD PRESSURE: 115 MMHG | HEART RATE: 73 BPM | WEIGHT: 202.05 LBS | TEMPERATURE: 98.2 F | OXYGEN SATURATION: 99 % | BODY MASS INDEX: 38.18 KG/M2 | RESPIRATION RATE: 18 BRPM | DIASTOLIC BLOOD PRESSURE: 74 MMHG

## 2024-01-09 DIAGNOSIS — E66.01 CLASS 2 SEVERE OBESITY DUE TO EXCESS CALORIES WITH SERIOUS COMORBIDITY IN ADULT, UNSPECIFIED BMI (H): ICD-10-CM

## 2024-01-09 DIAGNOSIS — E66.812 CLASS 2 SEVERE OBESITY DUE TO EXCESS CALORIES WITH SERIOUS COMORBIDITY IN ADULT, UNSPECIFIED BMI (H): ICD-10-CM

## 2024-01-09 DIAGNOSIS — J02.0 STREP PHARYNGITIS: ICD-10-CM

## 2024-01-09 DIAGNOSIS — R50.9 FEVER, UNSPECIFIED: Primary | ICD-10-CM

## 2024-01-09 LAB
DEPRECATED S PYO AG THROAT QL EIA: POSITIVE
FLUAV AG SPEC QL IA: NEGATIVE
FLUBV AG SPEC QL IA: NEGATIVE

## 2024-01-09 PROCEDURE — 87880 STREP A ASSAY W/OPTIC: CPT | Performed by: PHYSICIAN ASSISTANT

## 2024-01-09 PROCEDURE — 99213 OFFICE O/P EST LOW 20 MIN: CPT | Performed by: PHYSICIAN ASSISTANT

## 2024-01-09 PROCEDURE — 87804 INFLUENZA ASSAY W/OPTIC: CPT | Performed by: PHYSICIAN ASSISTANT

## 2024-01-09 RX ORDER — PENICILLIN V POTASSIUM 500 MG/1
500 TABLET, FILM COATED ORAL 2 TIMES DAILY
Qty: 20 TABLET | Refills: 0 | Status: SHIPPED | OUTPATIENT
Start: 2024-01-09 | End: 2024-01-19

## 2024-01-09 NOTE — LETTER
January 9, 2024      Najma Rodriguez  [address] [place] [address]        To Whom It May Concern:    Najma Rodriguez  was seen  in the clinic today. Please excuse them from work/school until 1/11/23          Sincerely,        Darrion Flynn PA-C

## 2024-01-09 NOTE — PROGRESS NOTES
Chief Complaint   Patient presents with    Fever     X 2 days, temp yesterday was at 100, no congestion or runny nose, having a lot of throat pain, no coughing, having chills       ASSESSMENT/PLAN:  Najma was seen today for fever.    Diagnoses and all orders for this visit:    Fever, unspecified  -     Streptococcus A Rapid Screen w/Reflex to PCR - Clinic Collect  -     Influenza A & B Antigen - Clinic Collect    Strep positive.  Start on penicillin.  Tylenol and ibuprofen as needed    Darrion Flynn PA-C      SUBJECTIVE:  Najma is a 38 year old female who presents to urgent care with 2 days of fever, fatigue, headache and sore throat.    ROS: Pertinent ROS neg other than the symptoms noted above in the HPI.     OBJECTIVE:  /74 (BP Location: Right arm, Patient Position: Sitting, Cuff Size: Adult Large)   Pulse 73   Temp 98.2  F (36.8  C) (Oral)   Resp 18   Wt 91.6 kg (202 lb 0.8 oz)   LMP 12/20/2023   SpO2 99%   BMI 38.18 kg/m     GENERAL: healthy, alert and no distress  EYES: Eyes grossly normal to inspection, PERRL and conjunctivae and sclerae normal  HENT: ear canals and TM's normal, nose and mouth without ulcers or lesions, tonsils 1+ bilaterally with oropharynx erythema  RESP: lungs clear to auscultation - no rales, rhonchi or wheezes  CV: regular rate and rhythm, normal S1 S2, no S3 or S4, no murmur, click or rub    DIAGNOSTICS    Results for orders placed or performed in visit on 01/09/24   Streptococcus A Rapid Screen w/Reflex to PCR - Clinic Collect     Status: Abnormal    Specimen: Throat; Swab   Result Value Ref Range    Group A Strep antigen Positive (A) Negative        Current Outpatient Medications   Medication    acetaminophen (TYLENOL) 500 MG tablet    albuterol (PROAIR HFA;PROVENTIL HFA;VENTOLIN HFA) 90 mcg/actuation inhaler    cholecalciferol 125 MCG (5000 UT) CAPS    gabapentin (NEURONTIN) 300 MG capsule    PARoxetine (PAXIL) 20 MG tablet     No current facility-administered  medications for this visit.      Patient Active Problem List   Diagnosis    Staring Spells (Reported)    Vitamin D deficiency    Moderate episode of recurrent major depressive disorder (H)    Borderline personality disorder (H)    Chronic Tension-type Headache    Anxiety Disorder Due To General Medical Condition With Panic Attacks    Chronic Pain    Post-traumatic Stress Disorder    Latent Tuberculosis    Obsessive compulsive disorder    Calcified granuloma of lung (H)    Daytime sleepiness    Rib pain on right side    Obstructive sleep apnea syndrome    Shortness of breath      Past Medical History:   Diagnosis Date    Anxiety disorder     Borderline personality disorder (H)     Chronic tension type headache     Daytime sleepiness 3/30/2023    Depression     Functional murmur     OCD (obsessive compulsive disorder)     Post traumatic stress disorder (PTSD)     Vitamin D deficiency      Past Surgical History:   Procedure Laterality Date     SECTION       Family History   Problem Relation Age of Onset    Cerebrovascular Disease Sister     Mental Illness Daughter         cutting and suicidal ideation     Social History     Tobacco Use    Smoking status: Never     Passive exposure: Never    Smokeless tobacco: Former    Tobacco comments:     no passive exposure  / Pt reports chewing Beetle Nuts   Substance Use Topics    Alcohol use: No              The plan of care was discussed with the patient. They understand and agree with the course of treatment prescribed. A printed summary was given including instructions and medications.  The use of Dragon/[employer] dictation services may have been used to construct the content in this note; any grammatical or spelling errors are non-intentional. Please contact the author of this note directly if you are in need of any clarification.

## 2024-01-27 ENCOUNTER — OFFICE VISIT (OUTPATIENT)
Dept: FAMILY MEDICINE | Facility: CLINIC | Age: 39
End: 2024-01-27
Payer: COMMERCIAL

## 2024-01-27 VITALS
SYSTOLIC BLOOD PRESSURE: 109 MMHG | OXYGEN SATURATION: 98 % | BODY MASS INDEX: 38.14 KG/M2 | HEIGHT: 61 IN | HEART RATE: 79 BPM | WEIGHT: 202 LBS | DIASTOLIC BLOOD PRESSURE: 77 MMHG | RESPIRATION RATE: 16 BRPM | TEMPERATURE: 98.2 F

## 2024-01-27 DIAGNOSIS — J02.0 STREPTOCOCCAL PHARYNGITIS: ICD-10-CM

## 2024-01-27 DIAGNOSIS — F33.1 MODERATE EPISODE OF RECURRENT MAJOR DEPRESSIVE DISORDER (H): ICD-10-CM

## 2024-01-27 DIAGNOSIS — K80.70 CALCULUS OF GALLBLADDER AND BILE DUCT WITHOUT CHOLECYSTITIS OR OBSTRUCTION: ICD-10-CM

## 2024-01-27 DIAGNOSIS — J02.9 SORE THROAT: Primary | ICD-10-CM

## 2024-01-27 LAB — DEPRECATED S PYO AG THROAT QL EIA: POSITIVE

## 2024-01-27 PROCEDURE — 87880 STREP A ASSAY W/OPTIC: CPT

## 2024-01-27 PROCEDURE — 99213 OFFICE O/P EST LOW 20 MIN: CPT | Performed by: STUDENT IN AN ORGANIZED HEALTH CARE EDUCATION/TRAINING PROGRAM

## 2024-01-27 RX ORDER — AMOXICILLIN 875 MG
875 TABLET ORAL 2 TIMES DAILY
Qty: 20 TABLET | Refills: 0 | Status: SHIPPED | OUTPATIENT
Start: 2024-01-27 | End: 2024-01-29

## 2024-01-27 RX ORDER — ACETAMINOPHEN 500 MG
500-1000 TABLET ORAL EVERY 6 HOURS PRN
Qty: 100 TABLET | Refills: 3 | Status: SHIPPED | OUTPATIENT
Start: 2024-01-27

## 2024-01-27 RX ORDER — PAROXETINE 20 MG/1
20 TABLET, FILM COATED ORAL EVERY MORNING
Qty: 30 TABLET | Refills: 1 | Status: SHIPPED | OUTPATIENT
Start: 2024-01-27 | End: 2024-02-26

## 2024-01-27 NOTE — PROGRESS NOTES
Assessment & Plan     Streptococcal pharyngitis  Sore throat  Will treat patient with supportive and symptomatic measures for pharyngitis at this time which include: Fluids, rest, over-the-counter medications; cough suppressants, decongestants, antihistamines, and expectorants, side effects of medications reviewed. Educated patient that honey, warm fluids and gargling saltwater can also help  Additionally tested for bacterial cause and test was positive for streptococcal A, an antibiotic prescription was supplied to the pharmacy, side effects of medications reviewed. Additionally we discussed if symptoms do not improve after starting today's treatment (or if symptoms worsen) to follow up in 5-7 days. Educated patient on the warning signs of a peritonsillar abscess and to report to the emergency department if they notice any of these (trismus, dysphonia, uvular deviation, unilateral edema of peritonsillar region)  - Streptococcus A Rapid Screen w/Reflex to PCR - Clinic Collect  - amoxicillin (AMOXIL) 875 MG tablet  Dispense: 20 tablet; Refill: 0  - acetaminophen (TYLENOL) 500 MG tablet  Dispense: 100 tablet; Refill: 3    Moderate episode of recurrent major depressive disorder (H)  Patient requested refill until she is able to follow up with her primary provider.   - PARoxetine (PAXIL) 20 MG tablet  Dispense: 30 tablet; Refill: 1     15 minutes spent by me on the date of the encounter doing chart review, history and exam, documentation and further activities per the note. Billed based on complexity of care.     No follow-ups on file.    Liza Zambrano MD  [facility]    Osmin Yao is a 38 year old female who presents to clinic today for the following health issues:  Chief Complaint   Patient presents with    Pharyngitis     Has been having sore throat for the last week     Cough     Has been having cough for the last      HPI    Sneezing a lot for the last week. Runny nose feels  "like she needs to cry. Sore throat started yesterday with body aches. Reports some blurry vision and dizziness, right sided abdominal pain. Swelling in feet.     URI Adult    Onset of symptoms was 1 week(s) ago and worsened yesterday.  Course of illness is worsening.    Severity moderate  Current and Associated symptoms: chills, sweats, runny nose, stuffy nose, cough - non-productive, wheezing, sore throat, hoarse voice, facial pain/pressure, headache, body aches, fatigue, loose stools and itching ears,   Treatment measures tried include Turmeric and hot water.  Predisposing factors include ill contact: Work.    Review of Systems  Constitutional, HEENT, cardiovascular, pulmonary, gi and gu systems are negative, except as otherwise noted.      Objective    /77   Pulse 79   Temp 98.2  F (36.8  C) (Oral)   Resp 16   Ht 1.549 m (5' 1\")   Wt 91.6 kg (202 lb)   LMP 12/20/2023   SpO2 98%   BMI 38.17 kg/m    Physical Exam   GENERAL: alert and no distress  EYES: Eyes grossly normal to inspection, PERRL, and strabismus on the left  HENT: normal cephalic/atraumatic, ear canals and TM's normal, nose and mouth without ulcers or lesions, nasal mucosa edematous , rhinorrhea yellow and thick, oropharynx clear, oral mucous membranes moist, tonsillar erythema, and posterior pharyngeal erythema  NECK: no adenopathy, no asymmetry, masses, or scars  RESP: lungs clear to auscultation - no rales, rhonchi or wheezes  CV: regular rate and rhythm, normal S1 S2, no S3 or S4, no murmur, click or rub, no peripheral edema  SKIN: no suspicious lesions or rashes    Results for orders placed or performed in visit on 01/27/24 (from the past 24 hour(s))   Streptococcus A Rapid Screen w/Reflex to PCR - Clinic Collect    Specimen: Throat; Swab   Result Value Ref Range    Group A Strep antigen Positive (A) Negative         "

## 2024-01-29 ENCOUNTER — NURSE TRIAGE (OUTPATIENT)
Dept: FAMILY MEDICINE | Facility: CLINIC | Age: 39
End: 2024-01-29

## 2024-01-29 ENCOUNTER — OFFICE VISIT (OUTPATIENT)
Dept: FAMILY MEDICINE | Facility: CLINIC | Age: 39
End: 2024-01-29
Payer: COMMERCIAL

## 2024-01-29 VITALS
SYSTOLIC BLOOD PRESSURE: 113 MMHG | DIASTOLIC BLOOD PRESSURE: 78 MMHG | HEART RATE: 60 BPM | HEIGHT: 61 IN | BODY MASS INDEX: 37.86 KG/M2 | WEIGHT: 200.5 LBS | OXYGEN SATURATION: 98 % | TEMPERATURE: 98.2 F | RESPIRATION RATE: 18 BRPM

## 2024-01-29 DIAGNOSIS — J06.9 VIRAL URI WITH COUGH: ICD-10-CM

## 2024-01-29 DIAGNOSIS — T36.0X5A ADVERSE EFFECT OF AMOXICILLIN: ICD-10-CM

## 2024-01-29 DIAGNOSIS — J02.0 STREPTOCOCCAL PHARYNGITIS: Primary | ICD-10-CM

## 2024-01-29 DIAGNOSIS — R06.02 SHORTNESS OF BREATH: ICD-10-CM

## 2024-01-29 PROCEDURE — 99214 OFFICE O/P EST MOD 30 MIN: CPT | Performed by: FAMILY MEDICINE

## 2024-01-29 RX ORDER — ALBUTEROL SULFATE 90 UG/1
2 AEROSOL, METERED RESPIRATORY (INHALATION) EVERY 4 HOURS PRN
Qty: 8.5 G | Refills: 1 | Status: SHIPPED | OUTPATIENT
Start: 2024-01-29

## 2024-01-29 RX ORDER — AZITHROMYCIN 250 MG/1
TABLET, FILM COATED ORAL
Qty: 6 TABLET | Refills: 0 | Status: SHIPPED | OUTPATIENT
Start: 2024-01-29 | End: 2024-02-03

## 2024-01-29 ASSESSMENT — PATIENT HEALTH QUESTIONNAIRE - PHQ9
SUM OF ALL RESPONSES TO PHQ QUESTIONS 1-9: 0
SUM OF ALL RESPONSES TO PHQ QUESTIONS 1-9: 0

## 2024-01-29 ASSESSMENT — ENCOUNTER SYMPTOMS: COUGH: 1

## 2024-01-29 NOTE — PROGRESS NOTES
"  Assessment & Plan     Viral URI with cough  With new cough and rhinorrhea, likely has viral URI in addition to strep pharyngitis. Normal lung exam today and appears well. She requests refill of her albuterol.  - albuterol (PROAIR HFA/PROVENTIL HFA/VENTOLIN HFA) 108 (90 Base) MCG/ACT inhaler  Dispense: 8.5 g; Refill: 1    Adverse effect of amoxicillin  I think this was less likely an allergic reaction based on the history she reports. Symptoms could actually be consistent with fever related to her strep pharyngitis or viral URI.  I will for now add amoxicillin to her allergy list and request allergy consult to see if they can clear her of that allergy.   - Adult Allergy/Asthma  Referral    Streptococcal pharyngitis  Stop amoxicillin due to concern for reaction, and start zpack instead.   - azithromycin (ZITHROMAX) 250 MG tablet  Dispense: 6 tablet; Refill: 0              BMI  Estimated body mass index is 37.76 kg/m  as calculated from the following:    Height as of this encounter: 1.552 m (5' 1.1\").    Weight as of this encounter: 90.9 kg (200 lb 8 oz).           Subjective   Najma is a 38 year old, presenting for the following health issues:  Medication Problem (States start feeling dizzy and shortness of breath after taking Amoxicillins), Cough (Cough and sore throat started last Friday), and Nasal Congestion      1/29/2024    11:46 AM   Additional Questions   Roomed by Colby CRUZ   Accompanied by Self     Declines     Here for SOB since starting amox for group A strep pharyngitis    Patient was seen 1/27 and positive for strep. Prescribed amoxicillin    About 20 minutes after taking first dose, she felt very tired, felt like heart was pounding, felt shaking/chills, sweating palms, dizzy, felt like she was having to breathe fast. Hands started tingling.     Felt better after applying oil all over body to \"get rid of toxins\"    Felt somewhat unwell all day Sunday (yesterday) but overall better than " "Saturday night.     Did not take any amoxicillin yesterday (Sunday). Today she only took 1/2 tablet at 730am today (about 5 hours ago).    At no time did she have itching or rash. No nausea or vomiting. No oropharyngeal swelling.    Rhinorrhea, nasal congestion, cough have developed more recently than sore throat. Requests refill of albuterol inhaler - uses with viral URIs    Wants PCN instead - states it worked well last time - Strep 1/7 PCN; 1/27 positive again, amox prescribed                  Objective    /78   Pulse 60   Temp 98.2  F (36.8  C) (Oral)   Resp 18   Ht 1.552 m (5' 1.1\")   Wt 90.9 kg (200 lb 8 oz)   LMP 01/21/2024 (Exact Date)   SpO2 98%   BMI 37.76 kg/m    Body mass index is 37.76 kg/m .  Physical Exam   GENERAL: alert and no distress, well appearing  EYES: Eyes grossly normal to inspection, PERRL and conjunctivae and sclerae normal  HENT: mouth without ulcers or lesions, no oropharyngeal swelling  NECK: no adenopathy  RESP: Breathing comfortably, lungs clear to auscultation - no rales, rhonchi or wheezes  CV: regular rate and rhythm, normal S1 S2, no S3 or S4, no murmur, click or rub, no peripheral edema  MS: no gross musculoskeletal defects noted, no edema            Signed Electronically by: Jackelin Calero MD    "

## 2024-01-29 NOTE — LETTER
January 29, 2024      Najma Rodriguez  [address] [place] [address]        To Whom It May Concern:    Najma Rodriguez  was seen on 1/29/24.  Please excuse her  until 1/31/24 due to illness.        Sincerely,        Jakcelin Calero MD

## 2024-01-29 NOTE — TELEPHONE ENCOUNTER
"Scheduled for same day appointment. Symptoms since Saturday when starting amoxicillin. Recommended she have friend drive her to appt.  Also educated to next level of care [ED] for worsening SOB/dizziness.    Reason for Disposition   MILD difficulty breathing (e.g., minimal/no SOB at rest, SOB with walking, pulse < 100) of new-onset or worse than normal    Additional Information   Negative: SEVERE difficulty breathing (e.g., struggling for each breath, speaks in single words, pulse > 120)   Negative: Breathing stopped and hasn't returned   Negative: Choking on something   Negative: Bluish (or gray) lips or face   Negative: Difficult to awaken or acting confused (e.g., disoriented, slurred speech)   Negative: Passed out (i.e., fainted, collapsed and was not responding)   Negative: Wheezing started suddenly after medicine, an allergic food, or bee sting   Negative: Stridor (harsh sound while breathing in)   Negative: Slow, shallow and weak breathing   Negative: Sounds like a life-threatening emergency to the triager    Answer Assessment - Initial Assessment Questions  1. RESPIRATORY STATUS: \"Describe your breathing?\" (e.g., wheezing, shortness of breath, unable to speak, severe coughing)       wheezing  2. ONSET: \"When did this breathing problem begin?\"       With start of antibiotics  3. PATTERN \"Does the difficult breathing come and go, or has it been constant since it started?\"        constant  4. SEVERITY: \"How bad is your breathing?\" (e.g., mild, moderate, severe)     - MILD: No SOB at rest, mild SOB with walking, speaks normally in sentences, can lie down, no retractions, pulse < 100.     - MODERATE: SOB at rest, SOB with minimal exertion and prefers to sit, cannot lie down flat, speaks in phrases, mild retractions, audible wheezing, pulse 100-120.     - SEVERE: Very SOB at rest, speaks in single words, struggling to breathe, sitting hunched forward, retractions, pulse > 120       More severe with activity  5. " "RECURRENT SYMPTOM: \"Have you had difficulty breathing before?\" If Yes, ask: \"When was the last time?\" and \"What happened that time?\"       Yes but its worse  6. CARDIAC HISTORY: \"Do you have any history of heart disease?\" (e.g., heart attack, angina, bypass surgery, angioplasty)       no  7. LUNG HISTORY: \"Do you have any history of lung disease?\"  (e.g., pulmonary embolus, asthma, emphysema)      Shortness of breath is problem list  8. CAUSE: \"What do you think is causing the breathing problem?\"       medicine  9. OTHER SYMPTOMS: \"Do you have any other symptoms? (e.g., dizziness, runny nose, cough, chest pain, fever)      dizziness  10. O2 SATURATION MONITOR:  \"Do you use an oxygen saturation monitor (pulse oximeter) at home?\" If Yes, ask: \"What is your reading (oxygen level) today?\" \"What is your usual oxygen saturation reading?\" (e.g., 95%)        At work  11. PREGNANCY: \"Is there any chance you are pregnant?\" \"When was your last menstrual period?\"        no  12. TRAVEL: \"Have you traveled out of the country in the last month?\" (e.g., travel history, exposures)        no    Protocols used: Breathing Difficulty-A-[place]    "

## 2024-02-20 ENCOUNTER — OFFICE VISIT (OUTPATIENT)
Dept: FAMILY MEDICINE | Facility: CLINIC | Age: 39
End: 2024-02-20
Payer: COMMERCIAL

## 2024-02-20 VITALS
WEIGHT: 202.9 LBS | SYSTOLIC BLOOD PRESSURE: 121 MMHG | BODY MASS INDEX: 38.21 KG/M2 | OXYGEN SATURATION: 99 % | HEART RATE: 77 BPM | TEMPERATURE: 100.7 F | DIASTOLIC BLOOD PRESSURE: 75 MMHG | RESPIRATION RATE: 20 BRPM

## 2024-02-20 DIAGNOSIS — Z75.8 DOES NOT HAVE PRIMARY CARE PROVIDER: ICD-10-CM

## 2024-02-20 DIAGNOSIS — R30.0 DYSURIA: ICD-10-CM

## 2024-02-20 DIAGNOSIS — R50.9 FEVER, UNSPECIFIED FEVER CAUSE: Primary | ICD-10-CM

## 2024-02-20 LAB
ALBUMIN UR-MCNC: NEGATIVE MG/DL
APPEARANCE UR: ABNORMAL
BACTERIA #/AREA URNS HPF: ABNORMAL /HPF
BILIRUB UR QL STRIP: NEGATIVE
CAOX CRY #/AREA URNS HPF: ABNORMAL /HPF
COLOR UR AUTO: YELLOW
FLUAV AG SPEC QL IA: NEGATIVE
FLUBV AG SPEC QL IA: NEGATIVE
GLUCOSE UR STRIP-MCNC: NEGATIVE MG/DL
HGB UR QL STRIP: ABNORMAL
HYALINE CASTS #/AREA URNS LPF: ABNORMAL /LPF
KETONES UR STRIP-MCNC: NEGATIVE MG/DL
LEUKOCYTE ESTERASE UR QL STRIP: NEGATIVE
NITRATE UR QL: NEGATIVE
PH UR STRIP: 7 [PH] (ref 5–8)
RBC #/AREA URNS AUTO: ABNORMAL /HPF
SARS-COV-2 RNA RESP QL NAA+PROBE: NEGATIVE
SP GR UR STRIP: 1.02 (ref 1–1.03)
SQUAMOUS #/AREA URNS AUTO: ABNORMAL /LPF
UROBILINOGEN UR STRIP-ACNC: 0.2 E.U./DL
WBC #/AREA URNS AUTO: ABNORMAL /HPF

## 2024-02-20 PROCEDURE — 87635 SARS-COV-2 COVID-19 AMP PRB: CPT

## 2024-02-20 PROCEDURE — 87086 URINE CULTURE/COLONY COUNT: CPT

## 2024-02-20 PROCEDURE — 81001 URINALYSIS AUTO W/SCOPE: CPT

## 2024-02-20 PROCEDURE — 99213 OFFICE O/P EST LOW 20 MIN: CPT

## 2024-02-20 PROCEDURE — 87804 INFLUENZA ASSAY W/OPTIC: CPT

## 2024-02-20 RX ORDER — ACETAMINOPHEN 500 MG
1000 TABLET ORAL ONCE
Status: COMPLETED | OUTPATIENT
Start: 2024-02-20 | End: 2024-02-20

## 2024-02-20 RX ADMIN — Medication 1000 MG: at 17:56

## 2024-02-20 ASSESSMENT — ENCOUNTER SYMPTOMS
NAUSEA: 1
DIARRHEA: 1
CHILLS: 1
RESPIRATORY NEGATIVE: 1
EYES NEGATIVE: 1
ABDOMINAL PAIN: 1
VOMITING: 0

## 2024-02-20 NOTE — PROGRESS NOTES
Patient presents with:  Concern for frequent urination, no pain or burning with urinating, no fever (did have chills)  Abdominal Pain: Upset stomach x yesterday. Diarrhea, no constipation.   Fever: X today. No medication. No cough. Fatigue. Chills and body aches  Letter for School/Work: Work note of today's visit and be excused tomorrow      Clinical Decision Making:  Adamantly declines .   38 yr old female, well, non toxic appearing presenting with chills, nausea, diarrhea, urinary frequency, abd discomfort x 2 days. Reassuringly tolerating po. Exam reassuringly without tenderness, without rebound, without guarding. No CVA tenderness. VSS on RA but with temp of 100.7f - given tylenol in clinic.     Suspect likely gastroenteritis in setting of recent sick contact with GI viral symptoms. Although I have low suspicion for acute/surgical abd, I did recommend a KUB and lab work today as pt reporting abd discomfort, bloating, and is febrile - to which pt declined - stating she would come back tomorrow to do it if she didn't feel better. Discussed with pt at length I was concerned about her elevated temperature in the setting of her abd discomfort and she adamantly declined further work up. She was agreeable to UA, flu, COVID.    UA inconsistent with acute cystitis, especially considering her only urinary complaint is some occasional urinary frequency. However, UA possibly suggestive of renal stone and again discussed my concern for this at length with pt - recommended she establish care with a PCP to follow up on this, repeat UA - pt agreeable and PCP referral order placed.     Flu is negative.   COVID PCR pending.   Urine culture added on, pending.     At the end of the encounter, I discussed results, diagnosis, medications. Discussed red flags for immediate return to clinic/ER, as well as indications for follow up if no improvement. Patient understood and agreed to plan. Patient was stable for discharge.     "ICD-10-CM    1. Fever, unspecified fever cause  R50.9 Influenza A & B Antigen - Clinic Collect     Symptomatic COVID-19 Virus (Coronavirus) by PCR Nose     acetaminophen (TYLENOL) tablet 1,000 mg      2. Dysuria  R30.0 UA Macroscopic with reflex to Microscopic and Culture - Clinic Collect     UA Microscopic with Reflex to Culture     Urine Culture Aerobic Bacterial - lab collect     Urine Culture Aerobic Bacterial - lab collect      3. Does not have primary care provider  Z75.8 Primary Care Referral          Patient Instructions   Flu test is negative.    COVID test is pending.     Urine test negative for infection but you do have some blood in your urine which could be suggestive of a kidney stone. You need to follow up to have this re checked and you may need imaging.     Please follow up with a primary care provider - it is important to establish care. I have placed a referral to primary care for you.     I strongly recommend you go to the emergency department if you develop worse pain, if you are unable to keep down fluids, if any chest pain, or shortness of breath.     OK to take tylenol as needed for discomfort and fever.     HPI:  Najma Rodriguez is a 38 year old female who presents today with chills, nausea, diarrhea, abd discomfort.   Since yesterday generalized abd discomfort - described as \"bloaty.\"  Reports diarrhea yesterday and today.   Lack of appetite. Nausea. No vomiting.   No vaginal symptoms. Denies vaginal  bleeding.   Reports some urinary frequency. No dysuria. No hematuria.   No back pain. No flank pain.   Denies chance of pregnancy.   Denies concern for STIs.     Reports daughter at home recently ill with GI virus.     History obtained from the patient.    Problem List:  2024-01: Class 2 severe obesity due to excess calories with serious   comorbidity in adult (H)  2023-09: Obstructive sleep apnea syndrome  2023-09: Shortness of breath  2023-08: Rib pain on right side  2023-03: Daytime " sleepiness  2018-01: Calcified granuloma of lung (H)  2015-02: Obsessive compulsive disorder  Functional Murmur  Staring Spells (Reported)  Vitamin D deficiency  Moderate episode of recurrent major depressive disorder (H)  Sciatica  Borderline personality disorder (H)  Chronic Tension-type Headache  Anxiety Disorder Due To General Medical Condition With Panic Attacks  Chronic Pain  Lower Back Pain  Neuralgia  Post-traumatic Stress Disorder  Latent Tuberculosis  Gynecologic Services Intrauterine Device (IUD)      Past Medical History:   Diagnosis Date    Anxiety disorder     Borderline personality disorder (H)     Chronic tension type headache     Daytime sleepiness 3/30/2023    Depression     Functional murmur     OCD (obsessive compulsive disorder)     Post traumatic stress disorder (PTSD)     Vitamin D deficiency        Social History     Tobacco Use    Smoking status: Never     Passive exposure: Never    Smokeless tobacco: Former    Tobacco comments:     no passive exposure  / Pt reports chewing Betel Nuts   Substance Use Topics    Alcohol use: No       Review of Systems   Constitutional:  Positive for chills.   HENT: Negative.     Eyes: Negative.    Respiratory: Negative.     Gastrointestinal:  Positive for abdominal pain, diarrhea and nausea. Negative for vomiting.       Vitals:    02/20/24 1745   BP: 121/75   Pulse: 77   Resp: 20   Temp: (!) 100.7  F (38.2  C)   TempSrc: Tympanic   SpO2: 99%   Weight: 92 kg (202 lb 14.4 oz)       Physical Exam  Constitutional:       General: She is not in acute distress.     Appearance: Normal appearance. She is obese. She is not ill-appearing, toxic-appearing or diaphoretic.   HENT:      Head: Normocephalic and atraumatic.      Right Ear: Tympanic membrane, ear canal and external ear normal. There is no impacted cerumen.      Left Ear: Tympanic membrane, ear canal and external ear normal. There is no impacted cerumen.      Nose: Nose normal.      Mouth/Throat:      Mouth:  Mucous membranes are moist.      Pharynx: No oropharyngeal exudate or posterior oropharyngeal erythema.   Eyes:      General: No scleral icterus.        Right eye: No discharge.         Left eye: No discharge.      Conjunctiva/sclera: Conjunctivae normal.   Cardiovascular:      Rate and Rhythm: Normal rate and regular rhythm.      Heart sounds: Normal heart sounds. No murmur heard.  Pulmonary:      Effort: Pulmonary effort is normal. No respiratory distress.      Breath sounds: Normal breath sounds.   Abdominal:      General: Abdomen is flat. There is no distension.      Palpations: Abdomen is soft. There is no mass.      Tenderness: There is no abdominal tenderness. There is no right CVA tenderness, left CVA tenderness, guarding or rebound.      Hernia: No hernia is present.   Skin:     General: Skin is warm.      Capillary Refill: Capillary refill takes less than 2 seconds.   Neurological:      General: No focal deficit present.      Mental Status: She is alert and oriented to person, place, and time.   Psychiatric:         Mood and Affect: Mood normal.         Behavior: Behavior normal.         Thought Content: Thought content normal.         Judgment: Judgment normal.         Results:  Results for orders placed or performed in visit on 02/20/24   UA Macroscopic with reflex to Microscopic and Culture - Clinic Collect     Status: Abnormal    Specimen: Urine, Clean Catch   Result Value Ref Range    Color Urine Yellow Colorless, Straw, Light Yellow, Yellow    Appearance Urine Slightly Cloudy (A) Clear    Glucose Urine Negative Negative mg/dL    Bilirubin Urine Negative Negative    Ketones Urine Negative Negative mg/dL    Specific Gravity Urine 1.025 1.005 - 1.030    Blood Urine Large (A) Negative    pH Urine 7.0 5.0 - 8.0    Protein Albumin Urine Negative Negative mg/dL    Urobilinogen Urine 0.2 0.2, 1.0 E.U./dL    Nitrite Urine Negative Negative    Leukocyte Esterase Urine Negative Negative   UA Microscopic with  Reflex to Culture     Status: Abnormal   Result Value Ref Range    Bacteria Urine Few (A) None Seen /HPF    RBC Urine  (A) 0-2 /HPF /HPF    WBC Urine 0-5 0-5 /HPF /HPF    Squamous Epithelials Urine Moderate (A) None Seen /LPF    Calcium Oxalate Crystals Urine Few (A) None Seen /HPF    Hyaline Casts Urine 2-5 (A) None Seen /LPF    Narrative    Urine Culture not indicated   Influenza A & B Antigen - Clinic Collect     Status: Normal    Specimen: Nose; Swab   Result Value Ref Range    Influenza A antigen Negative Negative    Influenza B antigen Negative Negative    Narrative    Test results must be correlated with clinical data. If necessary, results should be confirmed by a molecular assay or viral culture.

## 2024-02-20 NOTE — LETTER
February 20, 2024      Najma Rodriguez  [address] [place] [address]        To Whom It May Concern:    Najma Rodriguez  was seen on 2/20/24.  Please excuse her  until 2/22/24 due to illness.        Sincerely,        ALY Ortiz CNP

## 2024-02-21 NOTE — PATIENT INSTRUCTIONS
Flu test is negative.    COVID test is pending.     Urine test negative for infection but you do have some blood in your urine which could be suggestive of a kidney stone. You need to follow up to have this re checked and you may need imaging.     Please follow up with a primary care provider - it is important to establish care. I have placed a referral to primary care for you.     I strongly recommend you go to the emergency department if you develop worse pain, if you are unable to keep down fluids, if any chest pain, or shortness of breath.     OK to take tylenol as needed for discomfort and fever.

## 2024-02-22 LAB — BACTERIA UR CULT: NORMAL

## 2024-02-23 DIAGNOSIS — H69.90 DYSFUNCTION OF EUSTACHIAN TUBE, UNSPECIFIED LATERALITY: Primary | ICD-10-CM

## 2024-02-26 ENCOUNTER — OFFICE VISIT (OUTPATIENT)
Dept: FAMILY MEDICINE | Facility: CLINIC | Age: 39
End: 2024-02-26
Payer: COMMERCIAL

## 2024-02-26 VITALS
SYSTOLIC BLOOD PRESSURE: 107 MMHG | RESPIRATION RATE: 18 BRPM | DIASTOLIC BLOOD PRESSURE: 75 MMHG | HEART RATE: 75 BPM | OXYGEN SATURATION: 99 % | TEMPERATURE: 97.3 F | BODY MASS INDEX: 37.34 KG/M2 | WEIGHT: 197.75 LBS | HEIGHT: 61 IN

## 2024-02-26 DIAGNOSIS — M77.12 LATERAL EPICONDYLITIS OF LEFT ELBOW: ICD-10-CM

## 2024-02-26 DIAGNOSIS — R10.84 ABDOMINAL PAIN, GENERALIZED: ICD-10-CM

## 2024-02-26 DIAGNOSIS — R14.0 BLOATING: Primary | ICD-10-CM

## 2024-02-26 DIAGNOSIS — R31.21 ASYMPTOMATIC MICROSCOPIC HEMATURIA: ICD-10-CM

## 2024-02-26 DIAGNOSIS — R50.9 SUBJECTIVE FEVER: ICD-10-CM

## 2024-02-26 DIAGNOSIS — F33.1 MODERATE EPISODE OF RECURRENT MAJOR DEPRESSIVE DISORDER (H): ICD-10-CM

## 2024-02-26 LAB
ALBUMIN UR-MCNC: NEGATIVE MG/DL
AMORPH CRY #/AREA URNS HPF: ABNORMAL /HPF
APPEARANCE UR: CLEAR
BACTERIA #/AREA URNS HPF: ABNORMAL /HPF
BILIRUB UR QL STRIP: NEGATIVE
COLOR UR AUTO: YELLOW
GLUCOSE UR STRIP-MCNC: NEGATIVE MG/DL
HGB UR QL STRIP: ABNORMAL
KETONES UR STRIP-MCNC: NEGATIVE MG/DL
LEUKOCYTE ESTERASE UR QL STRIP: NEGATIVE
MUCOUS THREADS #/AREA URNS LPF: PRESENT /LPF
NITRATE UR QL: NEGATIVE
PH UR STRIP: 8.5 [PH] (ref 5–7)
RBC #/AREA URNS AUTO: ABNORMAL /HPF
SP GR UR STRIP: 1.02 (ref 1–1.03)
SQUAMOUS #/AREA URNS AUTO: ABNORMAL /LPF
UROBILINOGEN UR STRIP-ACNC: 0.2 E.U./DL
WBC #/AREA URNS AUTO: ABNORMAL /HPF

## 2024-02-26 PROCEDURE — 80053 COMPREHEN METABOLIC PANEL: CPT | Performed by: FAMILY MEDICINE

## 2024-02-26 PROCEDURE — 36415 COLL VENOUS BLD VENIPUNCTURE: CPT | Performed by: FAMILY MEDICINE

## 2024-02-26 PROCEDURE — 81001 URINALYSIS AUTO W/SCOPE: CPT | Performed by: FAMILY MEDICINE

## 2024-02-26 PROCEDURE — 86140 C-REACTIVE PROTEIN: CPT | Performed by: FAMILY MEDICINE

## 2024-02-26 PROCEDURE — 99214 OFFICE O/P EST MOD 30 MIN: CPT | Performed by: FAMILY MEDICINE

## 2024-02-26 PROCEDURE — 83690 ASSAY OF LIPASE: CPT | Performed by: FAMILY MEDICINE

## 2024-02-26 RX ORDER — POLYETHYLENE GLYCOL 3350 17 G/17G
1 POWDER, FOR SOLUTION ORAL DAILY
Qty: 578 G | Refills: 0 | Status: SHIPPED | OUTPATIENT
Start: 2024-02-26

## 2024-02-26 RX ORDER — PAROXETINE 20 MG/1
TABLET, FILM COATED ORAL
Qty: 180 TABLET | Refills: 3 | Status: SHIPPED | OUTPATIENT
Start: 2024-02-26

## 2024-02-26 RX ORDER — IBUPROFEN 200 MG
400 TABLET ORAL EVERY 8 HOURS PRN
Qty: 120 TABLET | Refills: 0 | Status: SHIPPED | OUTPATIENT
Start: 2024-02-26

## 2024-02-26 NOTE — PROGRESS NOTES
"  Assessment & Plan     Asymptomatic microscopic hematuria  Haematuria in UC could have bernabe from end of menses. Will recheck today. CT to eval for stone or other cause. Follow up based on results   - UA Macroscopic with reflex to Microscopic and Culture - Lab Collect  - CT Abdomen Pelvis w Contrast  - UA Microscopic with Reflex to Culture    Lateral epicondylitis of left elbow  Trial of NSAIDs with food, ice, splint/compression band, and avoid aggravating activities. Follow up if worsening or not improving and next step would be ortho vs PT referral.   - ibuprofen (ADVIL/MOTRIN) 200 MG tablet  Dispense: 120 tablet; Refill: 0    Moderate episode of recurrent major depressive disorder (H)  Requests refill, confirms dose  - PARoxetine (PAXIL) 20 MG tablet  Dispense: 180 tablet; Refill: 3    Abdominal pain generalized, worse on left side, bloating, changes in stools, subjective fever  Recommend CT to evaluate for diverticulitis due to symptoms persistent since 2/18/24. Labs as below. Tender on exam, but overall benign without guarding or rebound, so ok to continue with outpatient work up at this time. Push fluids with electrolytes. Miralax for suspected constipation.   - Comprehensive metabolic panel (BMP + Alb, Alk Phos, ALT, AST, Total. Bili, TP)  - Lipase  - polyethylene glycol (MIRALAX) 17 GM/Dose powder  Dispense: 578 g; Refill: 0  - CT Abdomen Pelvis w Contrast    Addendum: inadvertently did not order CBC. Patient appeared well with normal vitals. Will add CRP for additional information since unable to add CBC          MED REC REQUIRED  Post Medication Reconciliation Status:  Discharge medications reconciled, continue medications without change  BMI  Estimated body mass index is 37.58 kg/m  as calculated from the following:    Height as of this encounter: 1.545 m (5' 0.83\").    Weight as of this encounter: 89.7 kg (197 lb 12 oz).             Subjective   Najma is a 38 year old, presenting for the following health " "issues:  Urgent Care follow-up      2/26/2024     3:00 PM   Additional Questions   Roomed by Jordana Jennings RN   Accompanied by none         2/26/2024     3:00 PM   Patient Reported Additional Medications   Patient reports taking the following new medications none     HPI   Here or urgent care follow up 2/20 note reviewed    Stomach still bloated, no significant pain. Feels like she needs to pass gas. When trying to pass BM, nothing comes out. At [facility], had hematuria. Menses ended the day before. No dysuria. No known h/o kidney stone.     The day she went to urgent care, had nausea, dizzy, diarrhea, bloating. Now no n/v, no diarrhea, but overall not feeling back to normal.     Eating makes bloating worse, so not having much appetite. Liquids are ok.     Similar symptoms after birth of children when having constipation    Left arm pain - bothers it to work. Long time, worse x 1 month. Points to lateral epicondyle to indicate where it is most painful                  Objective    /75 (BP Location: Left arm, Patient Position: Sitting, Cuff Size: Adult Large)   Pulse 75   Temp 97.3  F (36.3  C) (Temporal)   Resp 18   Ht 1.545 m (5' 0.83\")   Wt 89.7 kg (197 lb 12 oz)   LMP 02/11/2024 (Exact Date)   SpO2 99%   BMI 37.58 kg/m    Body mass index is 37.58 kg/m .  Physical Exam   GENERAL: alert and no distress  EYES: Eyes grossly normal to inspection, PERRL and conjunctivae and sclerae normal  NECK: no adenopathy  RESP: lungs clear to auscultation - no rales, rhonchi or wheezes  CV: regular rate and rhythm, normal S1 S2, no S3 or S4, no murmur, click or rub, no peripheral edema  ABDOMEN: soft, slightly distended, mild pain with palpation over left abdomen (upper and lower), no guarding or rebound.   MS: no gross musculoskeletal defects noted, no edema. Left elbow without swelling focal warmth or erythema. Full ROM. Tender to palpation over lateral epicondyle, and pain is triggered/reproduced with resisted wrist " extension.             Signed Electronically by: Jackelin Calero MD

## 2024-02-27 ENCOUNTER — HOSPITAL ENCOUNTER (OUTPATIENT)
Dept: CT IMAGING | Facility: HOSPITAL | Age: 39
Discharge: HOME | End: 2024-02-27
Attending: FAMILY MEDICINE | Admitting: FAMILY MEDICINE
Payer: COMMERCIAL

## 2024-02-27 LAB
ALBUMIN SERPL BCG-MCNC: 4.3 G/DL (ref 3.5–5.2)
ALP SERPL-CCNC: 77 U/L (ref 40–150)
ALT SERPL W P-5'-P-CCNC: 66 U/L (ref 0–50)
ANION GAP SERPL CALCULATED.3IONS-SCNC: 9 MMOL/L (ref 7–15)
AST SERPL W P-5'-P-CCNC: 45 U/L (ref 0–45)
BILIRUB SERPL-MCNC: <0.2 MG/DL
BUN SERPL-MCNC: 14.9 MG/DL (ref 6–20)
CALCIUM SERPL-MCNC: 9.4 MG/DL (ref 8.6–10)
CHLORIDE SERPL-SCNC: 101 MMOL/L (ref 98–107)
CREAT SERPL-MCNC: 0.7 MG/DL (ref 0.51–0.95)
CRP SERPL-MCNC: <3 MG/L
DEPRECATED HCO3 PLAS-SCNC: 28 MMOL/L (ref 22–29)
EGFRCR SERPLBLD CKD-EPI 2021: >90 ML/MIN/1.73M2
GLUCOSE SERPL-MCNC: 91 MG/DL (ref 70–99)
LIPASE SERPL-CCNC: 25 U/L (ref 13–60)
POTASSIUM SERPL-SCNC: 3.7 MMOL/L (ref 3.4–5.3)
PROT SERPL-MCNC: 7.7 G/DL (ref 6.4–8.3)
SODIUM SERPL-SCNC: 138 MMOL/L (ref 135–145)

## 2024-02-27 PROCEDURE — 74178 CT ABD&PLV WO CNTR FLWD CNTR: CPT

## 2024-02-27 PROCEDURE — 74177 CT ABD & PELVIS W/CONTRAST: CPT

## 2024-02-27 PROCEDURE — 250N000011 HC RX IP 250 OP 636: Performed by: FAMILY MEDICINE

## 2024-02-27 RX ORDER — IOPAMIDOL 755 MG/ML
90 INJECTION, SOLUTION INTRAVASCULAR ONCE
Status: COMPLETED | OUTPATIENT
Start: 2024-02-27 | End: 2024-02-27

## 2024-02-27 RX ADMIN — IOPAMIDOL 90 ML: 755 INJECTION, SOLUTION INTRAVENOUS at 17:45

## 2024-02-28 ENCOUNTER — TELEPHONE (OUTPATIENT)
Dept: FAMILY MEDICINE | Facility: CLINIC | Age: 39
End: 2024-02-28
Payer: COMMERCIAL

## 2024-02-28 NOTE — TELEPHONE ENCOUNTER
Writer called patient's mobile number with Noemi  ID# [id number]:    Left message to call back and ask to speak with an available triage nurse.    Writer called patient's home number: per  this number is invalid.    Writer called patient's mobile number once more with Noemi  ID#[id number]:  Spoke with patient and reviewed message per Dr. Calero    Patient verbalized understanding and in agreement with plan.      EDITH CejaN, RN-BC  [facility]

## 2024-02-28 NOTE — TELEPHONE ENCOUNTER
----- Message from Jackelin Calero MD sent at 2/28/2024  9:03 AM CST -----  Please call w results - CT scan is normal/reassuring. No sign of infection. No sign of large kidney stone or blockage in urinary system. Try the miralax for constipation, and follow up if symptoms are not going away.

## 2024-03-03 ENCOUNTER — HEALTH MAINTENANCE LETTER (OUTPATIENT)
Age: 39
End: 2024-03-03

## 2024-03-18 DIAGNOSIS — E56.9 VITAMIN DEFICIENCY: ICD-10-CM

## 2024-03-18 DIAGNOSIS — Z76.0 ENCOUNTER FOR MEDICATION REFILL: ICD-10-CM

## 2024-07-02 ENCOUNTER — OFFICE VISIT (OUTPATIENT)
Dept: ALLERGY | Facility: CLINIC | Age: 39
End: 2024-07-02
Attending: FAMILY MEDICINE
Payer: COMMERCIAL

## 2024-07-02 VITALS
HEART RATE: 70 BPM | OXYGEN SATURATION: 98 % | RESPIRATION RATE: 16 BRPM | WEIGHT: 191.7 LBS | BODY MASS INDEX: 36.43 KG/M2

## 2024-07-02 DIAGNOSIS — T36.0X5A ADVERSE EFFECT OF AMOXICILLIN: ICD-10-CM

## 2024-07-02 DIAGNOSIS — L29.9 ITCHING: Primary | ICD-10-CM

## 2024-07-02 PROCEDURE — 95018 ALL TSTG PERQ&IQ DRUGS/BIOL: CPT | Performed by: ALLERGY & IMMUNOLOGY

## 2024-07-02 PROCEDURE — 99243 OFF/OP CNSLTJ NEW/EST LOW 30: CPT | Performed by: ALLERGY & IMMUNOLOGY

## 2024-07-02 RX ORDER — CETIRIZINE HYDROCHLORIDE 10 MG/1
TABLET ORAL
Qty: 60 TABLET | Refills: 0 | Status: SHIPPED | OUTPATIENT
Start: 2024-07-02

## 2024-07-02 RX ORDER — TRIAMCINOLONE ACETONIDE 1 MG/G
CREAM TOPICAL 2 TIMES DAILY
Qty: 454 G | Refills: 0 | Status: SHIPPED | OUTPATIENT
Start: 2024-07-02

## 2024-07-02 NOTE — LETTER
7/2/2024      Najma Rodriguez  [address]  [place] [address]      Dear Colleague,    Thank you for referring your patient, Najma Rodriguez, to the [facility]. Please see a copy of my visit note below.          Subjective  Najma is a 38 year old, presenting for the following health issues:  Allergy Consult (Itching and rashes)    HPI     Chief complaint amoxicillin allergy    History of present illness: This is a pleasant 38-year-old woman that I was asked to see for evaluation of amoxicillin allergy by Dr. Calero.  Per the record, patient had strep throat this year.  She took amoxicillin.  After few doses she noticed difficulty breathing and nasal congestion as well as hand shaking and feeling that her heart was throbbing.  She states this happened shortly after taking each dose.  She stopped the medication and symptoms resolved.  No rash.  No mucosal lesions or blistering.  She states this happened another time she took amoxicillin.      She would also like to discuss itching.  She states that she has itching without rash that occurs all over her body.  She states she will itch her skin so hard that it will bruise.  She does use over-the-counter Vaseline but no other prescription lotions or antihistamines for this.  She states this has been occurring for some time.  It occurs throughout the year and she has not noted any specific triggers.  She does not take any over-the-counter medications or supplements.  No nonsteroidal anti-inflammatory drugs.  She has not tried any allergy medication to treat the symptoms.              Objective   Pulse 70   Resp 16   Wt 87 kg (191 lb 11.2 oz)   SpO2 98%   BMI 36.43 kg/m    Body mass index is 36.43 kg/m .  Physical Exam     Gen: Pleasant female not in acute distress  HEENT: Eyes no erythema of the bulbar or palpebral conjunctiva, no edema. Ears: No deformities or lesions. Nose: No congestion,  Mouth: Throat clear, no lip or tongue edema.   Neck:  No masses lesions or swelling  Respiratory: No coughing with breathing, no retractions  Lymph: No visible supraclavicular or cervical lymphadenopathy  Skin: Bruising behind the right knee, excoriations, no specific rash  Psych: Alert and appropriate for age      At today s visit the patient/parent and I engaged in an informed consent discussion about allergy testing.  We discussed skin testing, blood testing,  and the alternative of not undergoing any testing. The patient/ parent has a preference for skin testing. We then discussed the risks and benefits of skin testing.  The patient/ parent understands skin testing risks can include, but are not limited to, urticaria, angioedema, shortness of breath, and severe anaphylaxis.  The benefits include, but are not limited, to evaluation for allergens causing symptoms.  After answering the patients/parents questions they have agreed to proceed with skin testing.      Controls     Needle Type --   Negative 0.05% Glycerine-Saline (W/F in millimeters) 0   Positive Histamine 1 mg/ml (W/F in millimeters) --   Positive Histamine 6 mg/ml (W/F in millimeters) 6/20   Intradermal Neg. 50% Control: Glycerine-Saline H (W/F in mm) 0   Antibiotics     Pre Pen Prick 0   Pre Pen ID --   Pre Pen Intradermal #1 (W/F in mm) 0   Pre Pen Intradermal #2 (W/F in mm) 0   Penicillin G (10,000 u/ml) Prick 0   Penicillin G (10,000 u/ml) ID --   Penicillin G (10,000 u/ml) Intradermal #1 (W/F in mm) 0   PenicilliPenicillin G (10,000 u/ml) Intradermal #2 (W/F in mm)n G (10,000 u/ml) Intradermal #1 (W/F in mm) 0       Impression report and plan:  1.  Penicillin allergy    Recommend in office challenge amoxicillin.  Her hand shaking and heart racing should not be considered IgE needed symptoms.  Explained the difference between adverse reaction and IgE-mediated reactions.    2.  Itching    Recommend triamcinolone cream.  There is no rash and so this is usually not due to allergy.  Recommended cetirizine  1 to 2 tablets daily as needed.  Reviewed sensitive skin care tips.  If itching continues, referral to dermatology.    Signed Electronically by: No VERA MD        Again, thank you for allowing me to participate in the care of your patient.        Sincerely,        No VERA MD

## 2024-07-02 NOTE — PATIENT INSTRUCTIONS
Triamcinolone cream twice daily as needed--followed by vaseline    Cetirizine 10 mg daily to twice daily as needed    If itching continues, see Dermatology    Amoxicillin challenge in August

## 2024-07-02 NOTE — PROGRESS NOTES
Subjective   Najma is a 38 year old, presenting for the following health issues:  Allergy Consult (Itching and rashes)    HPI     Chief complaint amoxicillin allergy    History of present illness: This is a pleasant 38-year-old woman that I was asked to see for evaluation of amoxicillin allergy by Dr. Calero.  Per the record, patient had strep throat this year.  She took amoxicillin.  After few doses she noticed difficulty breathing and nasal congestion as well as hand shaking and feeling that her heart was throbbing.  She states this happened shortly after taking each dose.  She stopped the medication and symptoms resolved.  No rash.  No mucosal lesions or blistering.  She states this happened another time she took amoxicillin.      She would also like to discuss itching.  She states that she has itching without rash that occurs all over her body.  She states she will itch her skin so hard that it will bruise.  She does use over-the-counter Vaseline but no other prescription lotions or antihistamines for this.  She states this has been occurring for some time.  It occurs throughout the year and she has not noted any specific triggers.  She does not take any over-the-counter medications or supplements.  No nonsteroidal anti-inflammatory drugs.  She has not tried any allergy medication to treat the symptoms.              Objective    Pulse 70   Resp 16   Wt 87 kg (191 lb 11.2 oz)   SpO2 98%   BMI 36.43 kg/m    Body mass index is 36.43 kg/m .  Physical Exam     Gen: Pleasant female not in acute distress  HEENT: Eyes no erythema of the bulbar or palpebral conjunctiva, no edema. Ears: No deformities or lesions. Nose: No congestion,  Mouth: Throat clear, no lip or tongue edema.   Neck: No masses lesions or swelling  Respiratory: No coughing with breathing, no retractions  Lymph: No visible supraclavicular or cervical lymphadenopathy  Skin: Bruising behind the right knee, excoriations, no specific rash  Psych:  Alert and appropriate for age      At today s visit the patient/parent and I engaged in an informed consent discussion about allergy testing.  We discussed skin testing, blood testing,  and the alternative of not undergoing any testing. The patient/ parent has a preference for skin testing. We then discussed the risks and benefits of skin testing.  The patient/ parent understands skin testing risks can include, but are not limited to, urticaria, angioedema, shortness of breath, and severe anaphylaxis.  The benefits include, but are not limited, to evaluation for allergens causing symptoms.  After answering the patients/parents questions they have agreed to proceed with skin testing.      Controls     Needle Type --   Negative 0.05% Glycerine-Saline (W/F in millimeters) 0   Positive Histamine 1 mg/ml (W/F in millimeters) --   Positive Histamine 6 mg/ml (W/F in millimeters) 6/20   Intradermal Neg. 50% Control: Glycerine-Saline H (W/F in mm) 0   Antibiotics     Pre Pen Prick 0   Pre Pen ID --   Pre Pen Intradermal #1 (W/F in mm) 0   Pre Pen Intradermal #2 (W/F in mm) 0   Penicillin G (10,000 u/ml) Prick 0   Penicillin G (10,000 u/ml) ID --   Penicillin G (10,000 u/ml) Intradermal #1 (W/F in mm) 0   PenicilliPenicillin G (10,000 u/ml) Intradermal #2 (W/F in mm)n G (10,000 u/ml) Intradermal #1 (W/F in mm) 0       Impression report and plan:  1.  Penicillin allergy    Recommend in office challenge amoxicillin.  Her hand shaking and heart racing should not be considered IgE needed symptoms.  Explained the difference between adverse reaction and IgE-mediated reactions.    2.  Itching    Recommend triamcinolone cream.  There is no rash and so this is usually not due to allergy.  Recommended cetirizine 1 to 2 tablets daily as needed.  Reviewed sensitive skin care tips.  If itching continues, referral to dermatology.    Signed Electronically by: No VERA MD

## 2024-08-22 ENCOUNTER — DOCUMENTATION ONLY (OUTPATIENT)
Dept: ALLERGY | Facility: CLINIC | Age: 39
End: 2024-08-22

## 2024-08-22 DIAGNOSIS — G89.29 OTHER CHRONIC PAIN: Primary | ICD-10-CM

## 2024-08-22 DIAGNOSIS — F06.4 ANXIETY DISORDER DUE TO MEDICAL CONDITION: ICD-10-CM

## 2024-08-22 DIAGNOSIS — G44.229 CHRONIC TENSION-TYPE HEADACHE, NOT INTRACTABLE: ICD-10-CM

## 2024-08-22 DIAGNOSIS — F43.10 POSTTRAUMATIC STRESS DISORDER: ICD-10-CM

## 2024-08-22 DIAGNOSIS — Z76.0 ENCOUNTER FOR MEDICATION REFILL: ICD-10-CM

## 2024-08-22 NOTE — TELEPHONE ENCOUNTER
Medication Question or Refill    Contacts       Contact Date/Time Type Contact Phone/Fax    08/22/2024 10:09 AM CDT Phone (Incoming) Najma Rodriguez (Self) [phone] (M)            What medication are you calling about (include dose and sig)?: gabapentin (NEURONTIN) 300 MG capsule     Preferred Pharmacy:   [id number] - [place], [place] - [address] AT [facility] [address]  [facility] [address]  [place] [address]  Phone: [phone] Fax: [phone]      Controlled Substance Agreement on file:   CSA -- Patient Level:    CSA: None found at the patient level.       Who prescribed the medication?: Rafael King    Do you need a refill? Yes    When did you use the medication last? Out of medication    Patient offered an appointment? Yes: 11/19/2024 with Dr. Donaldson.    Do you have any questions or concerns?  Yes: Patient insurance is pending.      Could we send this information to you in [employer] or would you prefer to receive a phone call?:   Patient would prefer a phone call   Okay to leave a detailed message?: Yes at Cell number on file:    Telephone Information:   Mobile [phone]

## 2024-08-23 RX ORDER — GABAPENTIN 300 MG/1
CAPSULE ORAL
Qty: 270 CAPSULE | Refills: 0 | Status: SHIPPED | OUTPATIENT
Start: 2024-08-23

## 2024-08-23 NOTE — TELEPHONE ENCOUNTER
Orders placed as requested. Please call to inform patient and encourage her to see Dr. Donaldson on 11/19/2024 as scheduled.     Myles Benavides MD  [facility]  8/23/2024  7:51 AM    Najma was seen today for refill request.    Diagnoses and all orders for this visit:    Other chronic pain  -     gabapentin (NEURONTIN) 300 MG capsule; [GABAPENTIN (NEURONTIN) 300 MG CAPSULE] TAKE 1 CAPSULE(300 MG) BY MOUTH THREE TIMES DAILY    Encounter for medication refill  -     gabapentin (NEURONTIN) 300 MG capsule; [GABAPENTIN (NEURONTIN) 300 MG CAPSULE] TAKE 1 CAPSULE(300 MG) BY MOUTH THREE TIMES DAILY    Anxiety Disorder Due To General Medical Condition With Panic Attacks  -     gabapentin (NEURONTIN) 300 MG capsule; [GABAPENTIN (NEURONTIN) 300 MG CAPSULE] TAKE 1 CAPSULE(300 MG) BY MOUTH THREE TIMES DAILY    Post-traumatic Stress Disorder  -     gabapentin (NEURONTIN) 300 MG capsule; [GABAPENTIN (NEURONTIN) 300 MG CAPSULE] TAKE 1 CAPSULE(300 MG) BY MOUTH THREE TIMES DAILY    Chronic tension-type headache, not intractable  -     gabapentin (NEURONTIN) 300 MG capsule; [GABAPENTIN (NEURONTIN) 300 MG CAPSULE] TAKE 1 CAPSULE(300 MG) BY MOUTH THREE TIMES DAILY

## 2024-09-10 ENCOUNTER — OFFICE VISIT (OUTPATIENT)
Dept: SLEEP MEDICINE | Facility: CLINIC | Age: 39
End: 2024-09-10
Payer: COMMERCIAL

## 2024-09-10 VITALS
RESPIRATION RATE: 12 BRPM | OXYGEN SATURATION: 98 % | BODY MASS INDEX: 36.93 KG/M2 | WEIGHT: 195.6 LBS | HEIGHT: 61 IN | HEART RATE: 64 BPM | SYSTOLIC BLOOD PRESSURE: 108 MMHG | DIASTOLIC BLOOD PRESSURE: 74 MMHG

## 2024-09-10 DIAGNOSIS — E66.9 OBESITY (BMI 35.0-39.9 WITHOUT COMORBIDITY): ICD-10-CM

## 2024-09-10 DIAGNOSIS — G47.33 OSA (OBSTRUCTIVE SLEEP APNEA): Primary | ICD-10-CM

## 2024-09-10 PROCEDURE — 99213 OFFICE O/P EST LOW 20 MIN: CPT | Performed by: INTERNAL MEDICINE

## 2024-09-10 PROCEDURE — G2211 COMPLEX E/M VISIT ADD ON: HCPCS | Performed by: INTERNAL MEDICINE

## 2024-09-10 NOTE — NURSING NOTE
"Chief Complaint   Patient presents with    CPAP Follow Up     Patient is present today needing CPAP supply refill.        Initial /74   Pulse 64   Resp 12   Ht 1.549 m (5' 0.98\")   Wt 88.7 kg (195 lb 9.6 oz)   SpO2 98%   BMI 36.98 kg/m   Estimated body mass index is 36.98 kg/m  as calculated from the following:    Height as of this encounter: 1.549 m (5' 0.98\").    Weight as of this encounter: 88.7 kg (195 lb 9.6 oz).    Medication Reconciliation: complete    Neck circumference: 14.17 inches / 36 centimeters.    DME: [facility].    Fely Ceballos CMA on 9/10/2024 at 8:28 AM       "

## 2024-09-10 NOTE — PROGRESS NOTES
Additional 10 minutes on the date of service was spent performing the following:    -Preparing to see the patient  -Ordering medications, tests, or procedures   -Documenting clinical information in the electronic or other health record     The patient declined my offer to get her a .    Thank you for the opportunity to participate in the care of Najma Rodriguez.     She is a 38 year old y/o female patient who comes to the sleep medicine clinic for follow up.  The patient had a sleep study performed on 08/25/2023 (AHI= 8.9).  This is the patient's first clinical visit since getting started on CPAP therapy.  She reports that she is getting benefit from CPAP usage.  However she states that her humidifier is damaged and her mask needs to be replaced.  She did not know how to contact the durable medical equipment company.     Assessment and Plan:  In summary Najma Rodriguez is a 38 year old year old female who is here for follow up.  1. EMA (obstructive sleep apnea)  I will write her a new prescription for her to get supplies from [employer].  I advised her to call the durable medical equipment company to replace her humidifier and mask.  After she has had at least 7 weeks of usage, I will I am recommend that she follow-up with me so we can review her compliance download data again.  - COMPREHENSIVE DME    2. Obesity (BMI 35.0-39.9 without comorbidity)  Healthy weigh management is recommended as part of the long term goal to improve EMA. I will give the patient a hand out on the topic in the AVS.    Compliance Download data for 30 days:  Compliance: 17%  Pressure setting: APAP 5-15 CWP  95% pressure: 9 CWP  Leak: Minimal  Residual AHI: 0.8 events per hour  Mask Tolerance: Good  Skin irritation: None  DME: [employer]    Lab reviewed: Discussed with patient.    Cpap Fu Template    Question 9/10/2024  8:16 AM CDT - Filed by Patient   Do you use a CPAP Machine at home? Yes    Overall, on a scale of 0-10 how would you rate your CPAP?    Is your mask comfortable? Yes   Is your mask leaking? No   Do you notice snoring with mask on? No   Do you notice gasping arousals with mask on? Yes   Are you having significant oral or nasal dryness? No   Is the pressure setting comfortable? Yes   What type of mask do you use? Nasal Pillow   What is your typical bedtime? 7   How long does it take you to go to sleep on PAP therapy? 1   What time do you typically get out of bed for the day? 2   How many hours on average per night are you using PAP therapy? 6   How many hours are you sleeping per night? 6   Do you feel well rested in the morning? No       LETTY:  LETTY Total Score: 15  Total score - Goldsmith: 6 (9/10/2024  8:18 AM)    Failed to redirect to the Timeline version of the [employer] Smart[employer].   Patient Active Problem List   Diagnosis    Staring Spells (Reported)    Vitamin D deficiency    Moderate episode of recurrent major depressive disorder (H)    Borderline personality disorder (H)    Chronic Tension-type Headache    Anxiety Disorder Due To General Medical Condition With Panic Attacks    Chronic Pain    Post-traumatic Stress Disorder    Latent Tuberculosis    Obsessive compulsive disorder    Calcified granuloma of lung (H)    Daytime sleepiness    Rib pain on right side    Obstructive sleep apnea syndrome    Shortness of breath    Class 2 severe obesity due to excess calories with serious comorbidity in adult (H)       Past Medical History:   Diagnosis Date    Anxiety disorder     Borderline personality disorder (H)     Chronic tension type headache     Daytime sleepiness 3/30/2023    Depression     Functional murmur     OCD (obsessive compulsive disorder)     Post traumatic stress disorder (PTSD)     Vitamin D deficiency        Past Surgical History:   Procedure Laterality Date     SECTION         Current Outpatient Medications   Medication Sig Dispense Refill    acetaminophen (TYLENOL) 500  "MG tablet Take 1-2 tablets (500-1,000 mg) by mouth every 6 hours as needed for mild pain 100 tablet 3    albuterol (PROAIR HFA/PROVENTIL HFA/VENTOLIN HFA) 108 (90 Base) MCG/ACT inhaler Inhale 2 puffs into the lungs every 4 hours as needed for wheezing, cough or shortness of breath 8.5 g 1    cetirizine (ZYRTEC) 10 MG tablet 1-2 tabs daily as needed for itch 60 tablet 0    cholecalciferol 125 MCG (5000 UT) CAPS [CHOLECALCIFEROL, VITAMIN D3, 125 MCG (5,000 UNIT) CAPSULE] TAKE 1 CAPSULE BY MOUTH DAILY 90 capsule 1    gabapentin (NEURONTIN) 300 MG capsule [GABAPENTIN (NEURONTIN) 300 MG CAPSULE] TAKE 1 CAPSULE(300 MG) BY MOUTH THREE TIMES DAILY 270 capsule 0    ibuprofen (ADVIL/MOTRIN) 200 MG tablet Take 2 tablets (400 mg) by mouth every 8 hours as needed for fever or pain 120 tablet 0    PARoxetine (PAXIL) 20 MG tablet [PAROXETINE (PAXIL) 20 MG TABLET] TAKE 2 TABLETS(40 MG) BY MOUTH EVERY MORNING 180 tablet 3    polyethylene glycol (MIRALAX) 17 GM/Dose powder Take 17 g (1 Capful) by mouth daily 578 g 0    triamcinolone (KENALOG) 0.1 % external cream Apply topically 2 times daily For 21 days max (Patient not taking: Reported on 9/10/2024) 454 g 0       Allergies   Allergen Reactions    Amoxicillin Shortness Of Breath    No Known Drug Allergy Unknown       Physical Exam:  /74   Pulse 64   Resp 12   Ht 1.549 m (5' 0.98\")   Wt 88.7 kg (195 lb 9.6 oz)   SpO2 98%   BMI 36.98 kg/m    BMI:Body mass index is 36.98 kg/m .   GEN: NAD,   Head: Normocephalic.  Psych: normal mood, normal affect    Labs/Studies:    I reviewed the efficacy and compliance report from her device. Data summarized on the HPI and the PAP compliance flow sheet.     Patient verbalized understanding of these issues, agrees with the plan and all questions were answered today. Patient was given an opportuntity to voice any other symptoms or concerns not listed above. Patient did not have any other symptoms or concerns.      Alvin Ambriz " Certified in Internal Medicine and Sleep Medicine    (Note created with Dragon voice recognition and unintended spelling errors and word substitutions may occur)     Audio and visual devices were used for this virtual clinic visit with permission from patient.

## 2024-09-10 NOTE — PATIENT INSTRUCTIONS
Your Body mass index is 36.98 kg/m .  Weight management is a personal decision.  If you are interested in exploring weight loss strategies, the following discussion covers the approaches that may be successful. Body mass index (BMI) is one way to tell whether you are at a healthy weight, overweight, or obese. It measures your weight in relation to your height.  A BMI of 18.5 to 24.9 is in the healthy range. A person with a BMI of 25 to 29.9 is considered overweight, and someone with a BMI of 30 or greater is considered obese. More than two-thirds of American adults are considered overweight or obese.  Being overweight or obese increases the risk for further weight gain. Excess weight may lead to heart disease and diabetes.  Creating and following plans for healthy eating and physical activity may help you improve your health.  Weight control is part of healthy lifestyle and includes exercise, emotional health, and healthy eating habits. Careful eating habits lifelong are the mainstay of weight control. Though there are significant health benefits from weight loss, long-term weight loss with diet alone may be very difficult to achieve- studies show long-term success with dietary management in less than 10% of people. Attaining a healthy weight may be especially difficult to achieve in those with severe obesity. In some cases, medications, devices and surgical management might be considered.  What can you do?  If you are overweight or obese and are interested in methods for weight loss, you should discuss this with your provider.   Consider reducing daily calorie intake by 500 calories.   Keep a food journal.   Avoiding skipping meals, consider cutting portions instead.    Diet combined with exercise helps maintain muscle while optimizing fat loss. Strength training is particularly important for building and maintaining muscle mass. Exercise helps reduce stress, increase energy, and improves fitness. Increasing  exercise without diet control, however, may not burn enough calories to loose weight.     Start walking three days a week 10-20 minutes at a time  Work towards walking thirty minutes five days a week   Eventually, increase the speed of your walking for 1-2 minutes at time    In addition, we recommend that you review healthy lifestyles and methods for weight loss available through the National Institutes of Health patient information sites:  http://win.niddk.nih.gov/publications/index.htm    And look into health and wellness programs that may be available through your health insurance provider, employer, local community center, or [employer].

## 2024-09-24 ENCOUNTER — PATIENT OUTREACH (OUTPATIENT)
Dept: CARE COORDINATION | Facility: CLINIC | Age: 39
End: 2024-09-24
Payer: COMMERCIAL

## 2024-10-18 ENCOUNTER — OFFICE VISIT (OUTPATIENT)
Dept: FAMILY MEDICINE | Facility: CLINIC | Age: 39
End: 2024-10-18
Payer: COMMERCIAL

## 2024-10-18 VITALS
SYSTOLIC BLOOD PRESSURE: 104 MMHG | HEART RATE: 77 BPM | DIASTOLIC BLOOD PRESSURE: 70 MMHG | OXYGEN SATURATION: 99 % | TEMPERATURE: 97.6 F | HEIGHT: 61 IN | RESPIRATION RATE: 21 BRPM | WEIGHT: 199.7 LBS | BODY MASS INDEX: 37.7 KG/M2

## 2024-10-18 DIAGNOSIS — R10.9 SIDE PAIN: ICD-10-CM

## 2024-10-18 DIAGNOSIS — R10.11 RUQ ABDOMINAL PAIN: Primary | ICD-10-CM

## 2024-10-18 LAB
ALBUMIN SERPL BCG-MCNC: 4.2 G/DL (ref 3.5–5.2)
ALBUMIN UR-MCNC: 30 MG/DL
ALP SERPL-CCNC: 68 U/L (ref 40–150)
ALT SERPL W P-5'-P-CCNC: 21 U/L (ref 0–50)
ANION GAP SERPL CALCULATED.3IONS-SCNC: 11 MMOL/L (ref 7–15)
APPEARANCE UR: CLEAR
AST SERPL W P-5'-P-CCNC: 28 U/L (ref 0–45)
BACTERIA #/AREA URNS HPF: ABNORMAL /HPF
BILIRUB SERPL-MCNC: 0.3 MG/DL
BILIRUB UR QL STRIP: NEGATIVE
BUN SERPL-MCNC: 12.8 MG/DL (ref 6–20)
CALCIUM SERPL-MCNC: 9.6 MG/DL (ref 8.8–10.4)
CHLORIDE SERPL-SCNC: 102 MMOL/L (ref 98–107)
COLOR UR AUTO: YELLOW
CREAT SERPL-MCNC: 0.61 MG/DL (ref 0.51–0.95)
CRP SERPL-MCNC: <3 MG/L
EGFRCR SERPLBLD CKD-EPI 2021: >90 ML/MIN/1.73M2
ERYTHROCYTE [DISTWIDTH] IN BLOOD BY AUTOMATED COUNT: 14.5 % (ref 10–15)
GLUCOSE SERPL-MCNC: 136 MG/DL (ref 70–99)
GLUCOSE UR STRIP-MCNC: NEGATIVE MG/DL
HCO3 SERPL-SCNC: 25 MMOL/L (ref 22–29)
HCT VFR BLD AUTO: 40.6 % (ref 35–47)
HGB BLD-MCNC: 12.6 G/DL (ref 11.7–15.7)
HGB UR QL STRIP: ABNORMAL
KETONES UR STRIP-MCNC: NEGATIVE MG/DL
LEUKOCYTE ESTERASE UR QL STRIP: ABNORMAL
LIPASE SERPL-CCNC: 21 U/L (ref 13–60)
MCH RBC QN AUTO: 24.3 PG (ref 26.5–33)
MCHC RBC AUTO-ENTMCNC: 31 G/DL (ref 31.5–36.5)
MCV RBC AUTO: 78 FL (ref 78–100)
NITRATE UR QL: NEGATIVE
PH UR STRIP: 6.5 [PH] (ref 5–7)
PLATELET # BLD AUTO: 377 10E3/UL (ref 150–450)
POTASSIUM SERPL-SCNC: 4.3 MMOL/L (ref 3.4–5.3)
PROT SERPL-MCNC: 7.8 G/DL (ref 6.4–8.3)
RBC # BLD AUTO: 5.18 10E6/UL (ref 3.8–5.2)
RBC #/AREA URNS AUTO: ABNORMAL /HPF
SODIUM SERPL-SCNC: 138 MMOL/L (ref 135–145)
SP GR UR STRIP: 1.01 (ref 1–1.03)
UROBILINOGEN UR STRIP-ACNC: 0.2 E.U./DL
WBC # BLD AUTO: 6.2 10E3/UL (ref 4–11)
WBC #/AREA URNS AUTO: ABNORMAL /HPF

## 2024-10-18 PROCEDURE — 99213 OFFICE O/P EST LOW 20 MIN: CPT | Performed by: STUDENT IN AN ORGANIZED HEALTH CARE EDUCATION/TRAINING PROGRAM

## 2024-10-18 PROCEDURE — 36415 COLL VENOUS BLD VENIPUNCTURE: CPT | Performed by: STUDENT IN AN ORGANIZED HEALTH CARE EDUCATION/TRAINING PROGRAM

## 2024-10-18 PROCEDURE — 83690 ASSAY OF LIPASE: CPT | Performed by: STUDENT IN AN ORGANIZED HEALTH CARE EDUCATION/TRAINING PROGRAM

## 2024-10-18 PROCEDURE — 81001 URINALYSIS AUTO W/SCOPE: CPT | Performed by: STUDENT IN AN ORGANIZED HEALTH CARE EDUCATION/TRAINING PROGRAM

## 2024-10-18 PROCEDURE — 85027 COMPLETE CBC AUTOMATED: CPT | Performed by: STUDENT IN AN ORGANIZED HEALTH CARE EDUCATION/TRAINING PROGRAM

## 2024-10-18 PROCEDURE — 86140 C-REACTIVE PROTEIN: CPT | Performed by: STUDENT IN AN ORGANIZED HEALTH CARE EDUCATION/TRAINING PROGRAM

## 2024-10-18 PROCEDURE — 80053 COMPREHEN METABOLIC PANEL: CPT | Performed by: STUDENT IN AN ORGANIZED HEALTH CARE EDUCATION/TRAINING PROGRAM

## 2024-10-18 ASSESSMENT — PATIENT HEALTH QUESTIONNAIRE - PHQ9
SUM OF ALL RESPONSES TO PHQ QUESTIONS 1-9: 4
10. IF YOU CHECKED OFF ANY PROBLEMS, HOW DIFFICULT HAVE THESE PROBLEMS MADE IT FOR YOU TO DO YOUR WORK, TAKE CARE OF THINGS AT HOME, OR GET ALONG WITH OTHER PEOPLE: NOT DIFFICULT AT ALL
SUM OF ALL RESPONSES TO PHQ QUESTIONS 1-9: 4

## 2024-10-18 ASSESSMENT — PAIN SCALES - GENERAL: PAINLEVEL: NO PAIN (0)

## 2024-10-18 NOTE — PROGRESS NOTES
Assessment & Plan     RUQ abdominal pain  Ride side pain  Vital signs normal including no fever. Exam reveals mild tenderness with palpation over right side/RUQ otherwise unremarkable.   Obtain below labs for initial evaluation. If abnormal, can consider more in-depth testing.  If normal, suspect possible acid reflux or MSK driving symptoms.  Can trial omeprazole 20mg daily for 8 weeks and topical Voltaren over affected area TID.  Instructed her to go the ER over the weekend if having any worsening symptoms or if she develops vomiting, fever, blood in her stool, or worsening abdominal pain.     - CBC with platelets; Future  - Comprehensive metabolic panel; Future  - CRP inflammation; Future  - Lipase; Future  - UA Macroscopic with reflex to Microscopic and Culture - Lab Collect; Future      Subjective   Najma is a 39 year old, presenting for the following health issues:  Pain (Right side - Pt stated that when she drinks anything bubbly it hurst, but when she stop it doesn't hurt. Also stated that when she eat spicy food it doesn't bother her at all. )        10/18/2024     9:42 AM   Additional Questions   Roomed by Robinson CALDWELL MA   Accompanied by Self     History of Present Illness       Reason for visit:  See doctor  Symptom onset:  More than a month  Symptom intensity:  Mild  Symptom progression:  Staying the same  Had these symptoms before:  Yes  Has tried/received treatment for these symptoms:  Yes  Previous treatment was successful:  No  What makes it worse:  No  What makes it better:  Go outside She is missing 7 dose(s) of medications per week.  She is not taking prescribed medications regularly due to other.     RUQ abdomen/side pain x1 month but has been a problem in the past too. Describes pain as intermittent mild and dull. Only present with large meals and bubbler or sometimes with palpation of area.   She does have intermittent acid reflux, not correlated with symptoms.   Denies any dysphagia,  "nausea/vomiting, jaundice, chest pain/pressure, shortness of breath, stool changes, other abdominal pain, dysuria, hematuria, or fever.   No etoh intake or drug use.   No injury/trauma to area.   Seen for similar pain in Feb 2024 - CT Abdomen/Pelvis, CMP, Lipase, CRP  completed, which showed elevated ALT of 66, otherwise normal.   History of Laparoscopic cholecystectomy in July 2023.  No treatments tried.   She is worried she could have a virus or other illness.                  Objective    /70 (BP Location: Right arm, Patient Position: Sitting, Cuff Size: Adult Large)   Pulse 77   Temp 97.6  F (36.4  C) (Temporal)   Resp 21   Ht 1.556 m (5' 1.25\")   Wt 90.6 kg (199 lb 11.2 oz)   LMP 09/23/2024 (Approximate)   SpO2 99%   BMI 37.43 kg/m    Body mass index is 37.43 kg/m .  Physical Exam   GENERAL: alert and no distress  EYES: Eyes grossly normal to inspection, PERRL and conjunctivae and sclerae normal  HENT: ear canals and TM's normal, nose and mouth without ulcers or lesions  NECK: no adenopathy, no asymmetry, masses, or scars  RESP: lungs clear to auscultation - no rales, rhonchi or wheezes  CV: regular rate and rhythm, normal S1 S2, no S3 or S4, no murmur, click or rub, no peripheral edema  Abdomen: Abdomen is symmetric and nondistended. No obvious hernias upon having patient left head.  Bowel sounds are present in all 4 quadrants. Percussion all all 4 quadrants are tympanic.  Liver, spleen, or kidneys cannot be palpated. Mild right side and RUQ tenderness but patient ranks pain as 1-2/10. No rebound tenderness or guarding. No CVA tenderness bilaterally.   Special Tests -   Barraza's Sign: negative   McBurney's Sign: negative    Rovsing's Sign: negative   Psoas Sign: negative   Carnett's Sign: negative    SKIN: no suspicious lesions or rashes  NEURO: Normal strength and tone, mentation intact and speech normal  PSYCH: mentation appears normal, affect normal/bright            Signed Electronically by: " Chepe Gusman PA-C

## 2024-10-21 ENCOUNTER — TELEPHONE (OUTPATIENT)
Dept: FAMILY MEDICINE | Facility: CLINIC | Age: 39
End: 2024-10-21
Payer: COMMERCIAL

## 2024-10-21 NOTE — LETTER
2024      Najma Alexandra [facility]  [address] [place] [address]        Dear GRACE Yao [facility] has made multiple attempts to reach you via phone.     Your urinalysis showed some protein in the urine - this should be followed up on in the neat future with urine albumin test. Small amount of leukocyte esterase but not WBC or RBC under microscope. If you have new urinary symptoms, then you should return for evaluation.      Glucose elevated but normal given that you were not fasting - otherwise labs normal. No cause for pain.      Chepe suspect acid reflux or MSK as cause. Can trial omeprazole 20mg daily for 8 weeks and topical Voltaren over affected area TID. Let Chepe's team know if you want prescription versus OTC. Schedule follow-up with PCP in 4-8 weeks. At that point rib/chest x-ray or other imaging could be considered. Follow-up sooner if acutely worsening.     You may schedule an appointment through [facility] or by calling Central [phone].       Sincerely,    Trenton SAENZ RN / Chepe Gusman PA-C

## 2024-10-21 NOTE — TELEPHONE ENCOUNTER
Writer called patient with Noemi  ID# [id number], twice:    Left message to call back and ask to speak with an available nurse.    EDITH CejaN, RN-[facility]

## 2024-10-21 NOTE — TELEPHONE ENCOUNTER
----- Message from Chepe Gusman sent at 10/21/2024  8:33 AM CDT -----  Please call patient.     Reviewed labs.     UA showed some protein in the urine - this should be followed up on in the neat future with urine albumin test. Small amount of leukocyte esterase but not WBC or RBC under microscope. If she has new urinary symptoms, then she should return for evaluation.     Glucose elevated but normal given she wasn't fasting - otherwise labs normal. No cause for pain.     I suspect acid reflux or MSK as cause. Can trial omeprazole 20mg daily for 8 weeks and topical Voltaren over affected area TID. Let me know if she wants prescription versus OTC. Schedule follow-up with PCP in 4-8 weeks. At that point rib/chest x-ray or other imaging could be considered. Follow-up sooner if acutely worsening.     HTM

## 2024-10-24 NOTE — TELEPHONE ENCOUNTER
Noemi  # [id number]  Left message for patient to call clinic and talk with a triage nurse.    Lakeshia Mota, RN, BSN, PHN  [facility]  [phone]

## 2024-10-25 NOTE — TELEPHONE ENCOUNTER
Third attempt: Called with a Noemi  ID [id number]. No answer. LVM to return call. Please relay clinician's message below upon return call.    Three unsuccessful attempts. Letter sent.     Trenton SAENZ RN  [facility]

## 2024-10-28 ENCOUNTER — TELEPHONE (OUTPATIENT)
Dept: FAMILY MEDICINE | Facility: CLINIC | Age: 39
End: 2024-10-28
Payer: COMMERCIAL

## 2024-10-28 NOTE — TELEPHONE ENCOUNTER
Call received from patient:  Returning call to clinic    Writer reviewed lab result message per H. TOI Gusman, within 10/21/24 telephone encounter.    Patient verbalized understanding and in agreement with plan.    Patient's questions regarding urine protein test and glucose test answered to the best of writer's knowledge.    Patient plans to follow up on urine albumin during annual physical on 11/19/24.  Visit date, time and location confirmed with patient.    Informed patient lab result letter should be arriving via mail this week.      TAYLOR Ceja, RN-[facility]     Pt resting with no complaints, vitals stable, labs within parameters for treatment, call bell within reach  Will continue to monitor

## 2024-11-21 DIAGNOSIS — F06.4 ANXIETY DISORDER DUE TO MEDICAL CONDITION: ICD-10-CM

## 2024-11-21 DIAGNOSIS — F43.10 POSTTRAUMATIC STRESS DISORDER: ICD-10-CM

## 2024-11-21 DIAGNOSIS — G89.29 OTHER CHRONIC PAIN: ICD-10-CM

## 2024-11-21 DIAGNOSIS — G44.229 CHRONIC TENSION-TYPE HEADACHE, NOT INTRACTABLE: ICD-10-CM

## 2024-11-21 DIAGNOSIS — Z76.0 ENCOUNTER FOR MEDICATION REFILL: ICD-10-CM

## 2024-11-27 RX ORDER — GABAPENTIN 300 MG/1
CAPSULE ORAL
Qty: 270 CAPSULE | Refills: 0 | Status: SHIPPED | OUTPATIENT
Start: 2024-11-27

## 2024-11-27 NOTE — TELEPHONE ENCOUNTER
Orders placed as requested.  Patient missed appointment on 11/19/2024.  Has an appointment on 2/17/2025 with PCP.    Myles Benavides MD  [facility]  11/27/2024  3:23 PM    Najma was seen today for medication refill.    Diagnoses and all orders for this visit:    Other chronic pain  -     gabapentin (NEURONTIN) 300 MG capsule; TAKE 1 CAPSULE BY MOUTH THREE TIMES DAILY. SEE YOUR DOCTOR FOR FURTHER REFILLS.    Encounter for medication refill  -     gabapentin (NEURONTIN) 300 MG capsule; TAKE 1 CAPSULE BY MOUTH THREE TIMES DAILY. SEE YOUR DOCTOR FOR FURTHER REFILLS.    Anxiety Disorder Due To General Medical Condition With Panic Attacks  -     gabapentin (NEURONTIN) 300 MG capsule; TAKE 1 CAPSULE BY MOUTH THREE TIMES DAILY. SEE YOUR DOCTOR FOR FURTHER REFILLS.    Post-traumatic Stress Disorder  -     gabapentin (NEURONTIN) 300 MG capsule; TAKE 1 CAPSULE BY MOUTH THREE TIMES DAILY. SEE YOUR DOCTOR FOR FURTHER REFILLS.    Chronic tension-type headache, not intractable  -     gabapentin (NEURONTIN) 300 MG capsule; TAKE 1 CAPSULE BY MOUTH THREE TIMES DAILY. SEE YOUR DOCTOR FOR FURTHER REFILLS.

## 2024-12-07 DIAGNOSIS — F33.1 MODERATE EPISODE OF RECURRENT MAJOR DEPRESSIVE DISORDER (H): ICD-10-CM

## 2024-12-08 RX ORDER — PAROXETINE 20 MG/1
TABLET, FILM COATED ORAL
Qty: 180 TABLET | Refills: 3 | OUTPATIENT
Start: 2024-12-08

## 2025-01-08 DIAGNOSIS — Z76.0 ENCOUNTER FOR MEDICATION REFILL: ICD-10-CM

## 2025-01-08 DIAGNOSIS — E56.9 VITAMIN DEFICIENCY: ICD-10-CM

## 2025-01-12 ENCOUNTER — OFFICE VISIT (OUTPATIENT)
Dept: URGENT CARE | Facility: URGENT CARE | Age: 40
End: 2025-01-12
Payer: COMMERCIAL

## 2025-01-12 VITALS
TEMPERATURE: 100.2 F | DIASTOLIC BLOOD PRESSURE: 74 MMHG | SYSTOLIC BLOOD PRESSURE: 111 MMHG | BODY MASS INDEX: 38.33 KG/M2 | WEIGHT: 203 LBS | OXYGEN SATURATION: 98 % | RESPIRATION RATE: 25 BRPM | HEART RATE: 87 BPM | HEIGHT: 61 IN

## 2025-01-12 DIAGNOSIS — E56.9 VITAMIN DEFICIENCY: ICD-10-CM

## 2025-01-12 DIAGNOSIS — Z76.0 ENCOUNTER FOR MEDICATION REFILL: ICD-10-CM

## 2025-01-12 DIAGNOSIS — J10.1 INFLUENZA A: Primary | ICD-10-CM

## 2025-01-12 LAB
FLUAV AG SPEC QL IA: POSITIVE
FLUBV AG SPEC QL IA: NEGATIVE

## 2025-01-12 PROCEDURE — 87804 INFLUENZA ASSAY W/OPTIC: CPT | Performed by: PHYSICIAN ASSISTANT

## 2025-01-12 PROCEDURE — 99214 OFFICE O/P EST MOD 30 MIN: CPT | Performed by: PHYSICIAN ASSISTANT

## 2025-01-12 RX ORDER — ALBUTEROL SULFATE 90 UG/1
1-2 INHALANT RESPIRATORY (INHALATION) EVERY 6 HOURS
Qty: 8.5 G | Refills: 0 | Status: SHIPPED | OUTPATIENT
Start: 2025-01-12 | End: 2025-01-17

## 2025-01-12 RX ORDER — BENZONATATE 100 MG/1
100 CAPSULE ORAL 3 TIMES DAILY PRN
Qty: 30 CAPSULE | Refills: 0 | Status: SHIPPED | OUTPATIENT
Start: 2025-01-12 | End: 2025-01-16

## 2025-01-12 NOTE — PROGRESS NOTES
URGENT CARE VISIT:    SUBJECTIVE:   Najma Rodriguez is a 39 year old female presenting with a chief complaint of fever, stuffy nose, cough - productive, shortness of breath, sore throat, and body aches.  Onset was 2 day(s) ago.   She denies the following symptoms: vomiting and diarrhea  Course of illness is same.    Treatment measures tried include Tylenol/Ibuprofen with no relief of symptoms.  Predisposing factors include None.    She is also requesting vit D medication refill.    PMH:   Past Medical History:   Diagnosis Date    Anxiety disorder     Borderline personality disorder (H)     Chronic tension type headache     Daytime sleepiness 3/30/2023    Depression     Functional murmur     OCD (obsessive compulsive disorder)     Post traumatic stress disorder (PTSD)     Vitamin D deficiency      Allergies: Amoxicillin and No known drug allergy   Medications:   Current Outpatient Medications   Medication Sig Dispense Refill    acetaminophen (TYLENOL) 500 MG tablet Take 1-2 tablets (500-1,000 mg) by mouth every 6 hours as needed for mild pain 100 tablet 3    albuterol (PROAIR HFA/PROVENTIL HFA/VENTOLIN HFA) 108 (90 Base) MCG/ACT inhaler Inhale 1-2 puffs into the lungs every 6 hours for 5 days. 8.5 g 0    albuterol (PROAIR HFA/PROVENTIL HFA/VENTOLIN HFA) 108 (90 Base) MCG/ACT inhaler Inhale 2 puffs into the lungs every 4 hours as needed for wheezing, cough or shortness of breath 8.5 g 1    benzonatate (TESSALON) 100 MG capsule Take 1 capsule (100 mg) by mouth 3 times daily as needed for cough. 30 capsule 0    cetirizine (ZYRTEC) 10 MG tablet 1-2 tabs daily as needed for itch 60 tablet 0    cholecalciferol 125 MCG (5000 UT) CAPS [CHOLECALCIFEROL, VITAMIN D3, 125 MCG (5,000 UNIT) CAPSULE] TAKE 1 CAPSULE BY MOUTH DAILY 90 capsule 1    gabapentin (NEURONTIN) 300 MG capsule TAKE 1 CAPSULE BY MOUTH THREE TIMES DAILY. SEE YOUR DOCTOR FOR FURTHER REFILLS. 270 capsule 0    PARoxetine (PAXIL) 20 MG tablet [PAROXETINE (PAXIL) 20 MG  "TABLET] TAKE 2 TABLETS(40 MG) BY MOUTH EVERY MORNING 180 tablet 3    ibuprofen (ADVIL/MOTRIN) 200 MG tablet Take 2 tablets (400 mg) by mouth every 8 hours as needed for fever or pain 120 tablet 0    polyethylene glycol (MIRALAX) 17 GM/Dose powder Take 17 g (1 Capful) by mouth daily (Patient not taking: Reported on 1/12/2025) 578 g 0    triamcinolone (KENALOG) 0.1 % external cream Apply topically 2 times daily For 21 days max (Patient not taking: Reported on 1/12/2025) 454 g 0     Social History:   Social History     Tobacco Use    Smoking status: Never     Passive exposure: Never    Smokeless tobacco: Former    Tobacco comments:     no passive exposure  / Pt reports chewing Betel Nuts   Substance Use Topics    Alcohol use: No       ROS:  General: fever  Skin: negative  Eyes: negative  Ears/Nose/Throat: nasal congestion  Respiratory: Cough  Cardiovascular: negative  Gastrointestinal: negative  Genitourinary: negative  Musculoskeletal: negative  Neurologic: negative      OBJECTIVE:  /74   Pulse 87   Temp 100.2  F (37.9  C) (Oral)   Resp 25   Ht 1.549 m (5' 1\")   Wt 92.1 kg (203 lb)   SpO2 98%   BMI 38.36 kg/m    GENERAL APPEARANCE: healthy, alert and no distress  EYES: EOMI,  PERRL, conjunctiva clear  HENT: ear canals and TM's normal.  Nose and mouth without ulcers, erythema or lesions  NECK: supple, nontender, no lymphadenopathy  RESP: lungs clear to auscultation - no rales, rhonchi or wheezes  CV: regular rates and rhythm, normal S1 S2, no murmur noted  SKIN: no suspicious lesions or rashes    Labs:    Results for orders placed or performed in visit on 01/12/25   Influenza A/B antigen     Status: Abnormal    Specimen: Nose; Swab   Result Value Ref Range    Influenza A antigen Positive (A) Negative    Influenza B antigen Negative Negative    Narrative    Test results must be correlated with clinical data. If necessary, results should be confirmed by a molecular assay or viral culture.       ASSESSMENT:   "  ICD-10-CM    1. Influenza A  J10.1 Influenza A/B antigen     benzonatate (TESSALON) 100 MG capsule     albuterol (PROAIR HFA/PROVENTIL HFA/VENTOLIN HFA) 108 (90 Base) MCG/ACT inhaler     CANCELED: Influenza A & B Antigen - Clinic Collect      2. Encounter for medication refill  Z76.0 cholecalciferol 125 MCG (5000 UT) CAPS      3. Vitamin deficiency  E56.9 cholecalciferol 125 MCG (5000 UT) CAPS          PLAN:  Patient Instructions   Patient was educated on the natural course of viral illness which typically lasts up to 10 days.  Conservative measures discussed including increased fluids, nasal saline irrigation (neti pot), warm steamy shower, salt water gargles, cough suppressants, expectorants (Mucinex), and analgesics (Tylenol and/or Ibuprofen). See your primary care provider if symptoms worsen or do not improve in 7 days. Seek emergency care if you develop fever over 104 or shortness of breath.    Patient verbalized understanding and is agreeable to plan. The patient was discharged ambulatory and in stable condition.    Rosi Greenberg PA-C ....................  1/12/2025   5:00 PM

## 2025-01-16 ENCOUNTER — NURSE TRIAGE (OUTPATIENT)
Dept: FAMILY MEDICINE | Facility: CLINIC | Age: 40
End: 2025-01-16

## 2025-01-16 ENCOUNTER — OFFICE VISIT (OUTPATIENT)
Dept: PEDIATRICS | Facility: CLINIC | Age: 40
End: 2025-01-16
Payer: COMMERCIAL

## 2025-01-16 VITALS
WEIGHT: 202.7 LBS | HEART RATE: 73 BPM | TEMPERATURE: 97.7 F | OXYGEN SATURATION: 98 % | SYSTOLIC BLOOD PRESSURE: 114 MMHG | BODY MASS INDEX: 38.27 KG/M2 | HEIGHT: 61 IN | RESPIRATION RATE: 32 BRPM | DIASTOLIC BLOOD PRESSURE: 66 MMHG

## 2025-01-16 DIAGNOSIS — J10.1 INFLUENZA A: ICD-10-CM

## 2025-01-16 DIAGNOSIS — J45.998 POST-VIRAL REACTIVE AIRWAY DISEASE: ICD-10-CM

## 2025-01-16 DIAGNOSIS — R09.81 NASAL CONGESTION: ICD-10-CM

## 2025-01-16 DIAGNOSIS — R06.09 DOE (DYSPNEA ON EXERTION): Primary | ICD-10-CM

## 2025-01-16 DIAGNOSIS — R59.0 MEDIASTINAL LYMPHADENOPATHY: ICD-10-CM

## 2025-01-16 DIAGNOSIS — R06.82 TACHYPNEA: ICD-10-CM

## 2025-01-16 DIAGNOSIS — R06.2 WHEEZING: ICD-10-CM

## 2025-01-16 LAB
ALBUMIN SERPL-MCNC: 3.2 G/DL (ref 3.4–5)
ALP SERPL-CCNC: 42 U/L (ref 40–150)
ALT SERPL W P-5'-P-CCNC: 24 U/L (ref 0–50)
ANION GAP SERPL CALCULATED.3IONS-SCNC: 9 MMOL/L (ref 3–14)
AST SERPL W P-5'-P-CCNC: 26 U/L (ref 0–45)
BASOPHILS # BLD AUTO: 0 10E3/UL (ref 0–0.2)
BASOPHILS NFR BLD AUTO: 0 %
BILIRUB SERPL-MCNC: 0.5 MG/DL (ref 0.2–1.3)
BUN SERPL-MCNC: 14 MG/DL (ref 7–30)
CALCIUM SERPL-MCNC: 8.9 MG/DL (ref 8.5–10.1)
CHLORIDE BLD-SCNC: 106 MMOL/L (ref 94–109)
CO2 SERPL-SCNC: 26 MMOL/L (ref 20–32)
CREAT SERPL-MCNC: 0.5 MG/DL (ref 0.52–1.04)
EGFRCR SERPLBLD CKD-EPI 2021: >90 ML/MIN/1.73M2
EOSINOPHIL # BLD AUTO: 0 10E3/UL (ref 0–0.7)
EOSINOPHIL NFR BLD AUTO: 0 %
ERYTHROCYTE [DISTWIDTH] IN BLOOD BY AUTOMATED COUNT: 14.5 % (ref 10–15)
GLUCOSE BLD-MCNC: 143 MG/DL (ref 70–99)
HCT VFR BLD AUTO: 41.8 % (ref 35–47)
HGB BLD-MCNC: 13.3 G/DL (ref 11.7–15.7)
IMM GRANULOCYTES # BLD: 0 10E3/UL
IMM GRANULOCYTES NFR BLD: 0 %
LYMPHOCYTES # BLD AUTO: 1.9 10E3/UL (ref 0.8–5.3)
LYMPHOCYTES NFR BLD AUTO: 16 %
MCH RBC QN AUTO: 24.1 PG (ref 26.5–33)
MCHC RBC AUTO-ENTMCNC: 31.8 G/DL (ref 31.5–36.5)
MCV RBC AUTO: 76 FL (ref 78–100)
MONOCYTES # BLD AUTO: 0.5 10E3/UL (ref 0–1.3)
MONOCYTES NFR BLD AUTO: 4 %
NEUTROPHILS # BLD AUTO: 9.7 10E3/UL (ref 1.6–8.3)
NEUTROPHILS NFR BLD AUTO: 80 %
PLATELET # BLD AUTO: 338 10E3/UL (ref 150–450)
POTASSIUM BLD-SCNC: 3.9 MMOL/L (ref 3.4–5.3)
PROT SERPL-MCNC: 7.8 G/DL (ref 6.8–8.8)
RBC # BLD AUTO: 5.51 10E6/UL (ref 3.8–5.2)
SARS-COV-2 RNA RESP QL NAA+PROBE: NEGATIVE
SODIUM SERPL-SCNC: 141 MMOL/L (ref 135–145)
WBC # BLD AUTO: 12.1 10E3/UL (ref 4–11)

## 2025-01-16 RX ORDER — PREDNISONE 10 MG/1
TABLET ORAL
Qty: 33 TABLET | Refills: 0 | Status: SHIPPED | OUTPATIENT
Start: 2025-01-16 | End: 2025-01-29

## 2025-01-16 RX ORDER — IPRATROPIUM BROMIDE AND ALBUTEROL SULFATE 2.5; .5 MG/3ML; MG/3ML
3 SOLUTION RESPIRATORY (INHALATION) ONCE
Status: ACTIVE | OUTPATIENT
Start: 2025-01-16

## 2025-01-16 RX ORDER — BENZONATATE 100 MG/1
100 CAPSULE ORAL 3 TIMES DAILY PRN
Qty: 30 CAPSULE | Refills: 0 | Status: SHIPPED | OUTPATIENT
Start: 2025-01-16

## 2025-01-16 RX ORDER — ONDANSETRON 2 MG/ML
4 INJECTION INTRAMUSCULAR; INTRAVENOUS
Status: DISCONTINUED | OUTPATIENT
Start: 2025-01-16 | End: 2025-01-16

## 2025-01-16 RX ORDER — KETOROLAC TROMETHAMINE 30 MG/ML
15 INJECTION, SOLUTION INTRAMUSCULAR; INTRAVENOUS ONCE
Status: COMPLETED | OUTPATIENT
Start: 2025-01-16 | End: 2025-01-16

## 2025-01-16 RX ORDER — IPRATROPIUM BROMIDE AND ALBUTEROL SULFATE 2.5; .5 MG/3ML; MG/3ML
3 SOLUTION RESPIRATORY (INHALATION) ONCE
Status: COMPLETED | OUTPATIENT
Start: 2025-01-16 | End: 2025-01-16

## 2025-01-16 RX ORDER — HYDROXYZINE HYDROCHLORIDE 25 MG/1
25 TABLET, FILM COATED ORAL 3 TIMES DAILY
Qty: 30 TABLET | Refills: 0 | Status: SHIPPED | OUTPATIENT
Start: 2025-01-16

## 2025-01-16 RX ORDER — IPRATROPIUM BROMIDE AND ALBUTEROL SULFATE 2.5; .5 MG/3ML; MG/3ML
1 SOLUTION RESPIRATORY (INHALATION) EVERY 6 HOURS PRN
Qty: 90 ML | Refills: 0 | Status: SHIPPED | OUTPATIENT
Start: 2025-01-16

## 2025-01-16 RX ADMIN — IPRATROPIUM BROMIDE AND ALBUTEROL SULFATE 3 ML: 2.5; .5 SOLUTION RESPIRATORY (INHALATION) at 10:45

## 2025-01-16 RX ADMIN — KETOROLAC TROMETHAMINE 15 MG: 30 INJECTION, SOLUTION INTRAMUSCULAR; INTRAVENOUS at 11:09

## 2025-01-16 ASSESSMENT — PAIN SCALES - GENERAL: PAINLEVEL_OUTOF10: SEVERE PAIN (6)

## 2025-01-16 NOTE — PROGRESS NOTES
Before walking to CT. RR 32 with O2 98% on room air.     Pt states she does feel better after duoneb.     Walked patient to CT with O2 remaining 98%-97% on room air, provider informed.

## 2025-01-16 NOTE — TELEPHONE ENCOUNTER
Nurse Triage SBAR    Is this a 2nd Level Triage? YES, LICENSED PRACTITIONER REVIEW IS REQUIRED    Situation: coughing up blood     Background:   - patient was seen at urgent care on 1/12 for symptoms and at the ER on 1/14.    Assessment:   - she is coughing up blood. Sometimes they are in streaks but sometimes it is just blood in her hand. Unable to measure  - coughing up blood started on 1/14 after she came home from the ER.  - she stated she has a fever but does not have a thermometer to measure it.  - breathing is getting better but still experiences shortness of breath when she is walking up the stairs.     Protocol Recommended Disposition:   See in Office Today    - writer huddled with Dr. Guzman and recommended ADS. Pt is agreeable with plan.    Recommendation:  ADS         Does the patient meet one of the following criteria for ADS visit consideration? 16+ years old, with an [facility] PCP     TIP  Providers, please consider if this condition is appropriate for management at one of our Acute and Diagnostic Services sites.     If patient is a good candidate, please use dotphrase <dot>triageresponse and select Refer to ADS to document.      Shmuel Kothari, BSN RN  [facility]      Reason for Disposition   Coughing up parker-colored sputum    Additional Information   Negative: SEVERE difficulty breathing (e.g., struggling for each breath, speaks in single words)   Negative: Chest pain and difficulty breathing   Negative: Bluish (or gray) lips or face now   Negative: Passed out (e.g., fainted, lost consciousness, blacked out and was not responding)   Negative: Shock suspected (e.g., cold/pale/clammy skin, too weak to stand, low BP, rapid pulse)   Negative: Difficult to awaken or acting confused (e.g., disoriented, slurred speech)   Negative: Recent chest injury (i.e., past 24 hours)   Negative: Coughed up blood and large amount (Such as: 'a half cup of blood')   Negative: Sounds like a  "life-threatening emergency to the triager   Negative: MODERATE difficulty breathing (e.g., speaks in phrases, SOB even at rest, pulse 100-120) and still present when not coughing   Negative: Chest pain   Negative: Unclear to triager if the patient is coughing up blood or vomiting blood   Negative: History of prior 'blood clot' in leg or lungs (i.e., deep vein thrombosis, pulmonary embolism)   Negative: History of inherited increased risk of blood clots (e.g., Factor 5 Leiden, Anti-thrombin 3, Protein C or Protein S deficiency, Prothrombin mutation)   Negative: Pregnant or postpartum (from 0 to 6 weeks after delivery)   Negative: Hip or leg fracture (broken bone) in past month (or had cast on leg or ankle in past month)   Negative: Long-distance travel in past month (e.g., car, bus, train, plane; with trip lasting 6 or more hours)   Negative: Bedridden (e.g., CVA, chronic illness, recovering from surgery)   Negative: Patient sounds very sick or weak to the triager   Negative: MILD difficulty breathing (e.g., minimal/no SOB at rest, SOB with walking, pulse <100) and still present when not coughing (Exception: No change from usual, chronic shortness of breath.)   Negative: Coughed up blood and > 1 tablespoon (15 ml)   Negative: Fever > 103 F (39.4 C)   Negative: Fever > 101 F (38.3 C) and age > 60 years   Negative: Fever > 100 F (37.8 C) and diabetes mellitus or weak immune system (e.g., HIV positive, cancer chemo, splenectomy, organ transplant, chronic steroids)   Negative: Has underlying lung disease (e.g., COPD, chronic bronchitis or emphysema) and sputum has turned yellow or green in color    Answer Assessment - Initial Assessment Questions  1. ONSET: \"When did the cough begin?\"       After she came home from the ER 1/14    2. SEVERITY: \"How bad is the cough today?\" \"Did the blood appear after a coughing spell?\"       Coughing is still the same. Sometime she sees blood, sometimes there is no blood    3. SPUTUM: " "\"Describe the color of your sputum\" (e.g., none, dry cough; clear, white, yellow, green)      Dark and cloudy with blood    4. HEMOPTYSIS: \"How much blood?\" (e.g., flecks, streaks, tablespoons)      Sometimes there are steaks of blood and sometimes tablespoon of work    5. DIFFICULTY BREATHING: \"Are you having difficulty breathing?\" If Yes, ask: \"How bad is it?\" (e.g., mild, moderate, severe)       Breathing is better but she feels short of breath when she is going up the stairs    6. FEVER: \"Do you have a fever?\" If Yes, ask: \"What is your temperature, how was it measured, and when did it start?\"      Yes, but she said she does not have a thermometer to measure.    7. CARDIAC HISTORY: \"Do you have any history of heart disease?\" (e.g., heart attack, congestive heart failure)       No    8. LUNG HISTORY: \"Do you have any history of lung disease?\"  (e.g., pulmonary embolus, asthma, emphysema)      No    9. PE RISK FACTORS: \"Do you have a history of blood clots?\" Note: Other risk factors include recent major surgery, recent prolonged travel, being bedridden.      NO    10. OTHER SYMPTOMS: \"Do you have any other symptoms?\" (e.g., runny nose, wheezing, chest pain)        Chest pain when coughing    11. PREGNANCY: \"Is there any chance you are pregnant?\" \"When was your last menstrual period?\"        No    12. TRAVEL: \"Have you traveled out of the country in the last month?\" (e.g., travel history, exposures)        no    Protocols used: Coughing Up Blood-A-[place]    "

## 2025-01-16 NOTE — PROGRESS NOTES
Acute and Diagnostic Services Clinic Visit    Assessment & Plan      Diagnosis Comments   1. NARAYANAN (dyspnea on exertion)  CBC with platelets differential, Comprehensive metabolic panel, ipratropium - albuterol 0.5 mg/2.5 mg/3 mL (DUONEB) neb solution 3 mL, lidocaine (viscous) (XYLOCAINE) 2 % 15 mL, alum & mag hydroxide-simethicone (MAALOX) 15 mL GI Cocktail, ketorolac (TORADOL) injection 15 mg, COVID-19 Virus (Coronavirus) by PCR Nose, ipratropium - albuterol 0.5 mg/2.5 mg/3 mL (DUONEB) 0.5-2.5 (3) MG/3ML neb solution, predniSONE (DELTASONE) 10 MG tablet, Nebulizer and Supplies Order for DME - ONLY FOR DME       2. Tachypnea  CBC with platelets differential, Comprehensive metabolic panel, ipratropium - albuterol 0.5 mg/2.5 mg/3 mL (DUONEB) neb solution 3 mL, lidocaine (viscous) (XYLOCAINE) 2 % 15 mL, alum & mag hydroxide-simethicone (MAALOX) 15 mL GI Cocktail, ketorolac (TORADOL) injection 15 mg, COVID-19 Virus (Coronavirus) by PCR Nose, ipratropium - albuterol 0.5 mg/2.5 mg/3 mL (DUONEB) 0.5-2.5 (3) MG/3ML neb solution, predniSONE (DELTASONE) 10 MG tablet, Nebulizer and Supplies Order for DME - ONLY FOR DME       3. Influenza A  benzonatate (TESSALON) 100 MG capsule, ipratropium - albuterol 0.5 mg/2.5 mg/3 mL (DUONEB) 0.5-2.5 (3) MG/3ML neb solution, predniSONE (DELTASONE) 10 MG tablet, Nebulizer and Supplies Order for DME - ONLY FOR DME       4. Wheezing        5. Post-viral reactive airway disease  ipratropium - albuterol 0.5 mg/2.5 mg/3 mL (DUONEB) neb solution 3 mL, ipratropium - albuterol 0.5 mg/2.5 mg/3 mL (DUONEB) 0.5-2.5 (3) MG/3ML neb solution, predniSONE (DELTASONE) 10 MG tablet, Nebulizer and Supplies Order for DME - ONLY FOR DME       6. Mediastinal lymphadenopathy  CT Chest w/o Contrast       7. Nasal congestion  hydrOXYzine HCl (ATARAX) 25 MG tablet               On exam, patient's vitals are stable except for respirations are 52.  Patient does have some visual increased work of breathing.  No obvious  retractions.  Patient has diffuse end expiratory wheezing in all lung fields and mild crackles in the right lower lung  SpO2 is 96% on room air  Given persistent/progressive symptoms-will pursue CT chest today.  Will get repeat blood work.  Will provide DuoNeb, Toradol, GI cocktail for symptom control.  Patient declines the Zofran for now.  Next steps will be based on the imaging results.    1:23 PM  CBC showed mild leukocytosis (expected given ongoing prednisone use) and CMP was unremarkable.  Glucose was elevated in the setting of prednisone use and there was hypoalbuminemia 2/2 low oral intake.  COVID-19 test was negative.  CT chest did not show any evidence of focal pneumonia.  There were mildly enlarged mediastinal and right hilar nodes without associated splenomegaly or abdominal adenopathy.  Mild bronchiectasis.  Patient was feeling much better after DuoNebs.  Tachypnea had resolved.  Reviewed with patient that at the lymphadenopathy was most likely reactive in the setting of recent flu.  Put in for future CT chest in 3 months to ensure resolution.  Given symptomatic improvement with the DuoNeb, will elect to extend prednisone and taper it down.  Will dispense DuoNeb solution and nebulizer  Will have her do hydroxyzine for anxiety as well as sleep/congestion and Tessalon Perles as needed for the cough  Continue to stay hydrated      45 minutes were spent doing chart review, history and exam, documentation and further activities per the note.         No follow-ups on file.    Subjective   Wangi is a 39 year old, presenting for the following health issues:  Cough (Coughing up blood. )        10/18/2024     9:42 AM   Additional Questions   Roomed by Robinson CALDWELL MA   Accompanied by Self     HPI     Patient is a 39-year-old female with PMH of borderline personality disorder, chronic tension type headaches, class II obesity, EMA, OCD who presents today for progressive shortness of breath    Was seen in in the urgent  "care on 1/12/2025.  She was found to be positive for flu.  Advised anticipatory guidance.  She then ended up in the emergency room 1/14/2025 for continued difficulty breathing, myalgias, shortness of breath.  Was having minimal relief with the albuterol 4 times a day and Tylenol.  Chest x-ray at that time showed no pulmonary infiltrates.  Was given prednisone x 4 days.      Patient was referred to ADS via triage line because she had called in reporting that she was having continued difficulty breathing and she was having some hemoptysis.    Patient is breathing slightly heavy in the room.  She is feeling \"really tired\".  She feels like her fatigue has continued to progress.  She is reporting NARAYANAN with small movements in her home.  Feels like work of breathing has increased overall.  She is subjectively nauseous, having decreased appetite and is continuing to feel body aches/chills.  Has been taking prednisone, albuterol and Tylenol.  She says that the inhaler is the only thing that helps.  Otherwise, the prednisone has not been that helpful.  When she is at rest, symptoms are minimal.  When she is moving around, she does feel like there is chest pressure.    The hemoptysis is mostly blood-tinged sputum that occurs with coughing fits.  Otherwise, she has no bright red blood or melena reported.      Shortness of Breath/Breathing Problem  Onset/Duration: 5 days  Progression of symptoms: same  Accompanying signs and symptoms:       SOB at rest: No       SOB with activity: YES       Pain with inspiration: No, but when she coughs she does       Cough: YES       Pink tinged sputum: YES       Sweating: YES       Nausea/vomiting: YES- nausea       Lightheadedness: YES       Palpitations: YES       Fever/chills: YES       Heartburn: No, but states that arms and legs feel like they are burning.   History   Family history of coagulation disorders: N/A  Tobacco use: YES- chewing  Previous similar symptoms: no   Precipitating " "factors:  Related to eating: No  Better with burping: No  Therapies tried and outcome: Prednisone from ED.         Review of Systems  Constitutional, neuro, ENT, endocrine, pulmonary, cardiac, gastrointestinal, genitourinary, musculoskeletal, integument and psychiatric systems are negative, except as otherwise noted.      Objective    /66 (BP Location: Right arm, Patient Position: Sitting, Cuff Size: Adult Large)   Pulse 66   Temp 97.7  F (36.5  C) (Oral)   Resp (!) 52   Ht 1.549 m (5' 1\")   Wt 91.9 kg (202 lb 11.2 oz)   SpO2 96%   BMI 38.30 kg/m    Body mass index is 38.3 kg/m .  Physical Exam   GENERAL: alert and no distress  EYES: Eyes grossly normal to inspection, and conjunctivae and sclerae normal  HENT: ear canals and TM's normal, nose and mouth without ulcers or lesions  (+) Postnasal drainage with cobblestoning  Nasal turbinate hypertrophy bilaterally  NECK: no adenopathy, no asymmetry, masses, or scars  RESP: Inspiratory and expiratory wheezing in all lung fields, mild crackles RLL, no rhonchi.  SpO2 96% on room air.  Tachypneic.  No nasal retractions.    CV: regular rate and rhythm, no murmur, click or rub, no peripheral edema  ABDOMEN: soft, nontender, no hepatosplenomegaly, no masses and bowel sounds normal  MS: no gross musculoskeletal defects noted, no edema  PSYCH: mentation appears normal, affect anxious         Signed Electronically by: Elana Castellanos DO    "

## 2025-01-16 NOTE — LETTER
2025    Wangramirez Alexandra Jennifer   1985        To Whom it May Concern;    Please excuse Najma Alexandra Jennifer from work/school from 24  - 24.     Sincerely,        Elana Castellanos DO

## 2025-01-16 NOTE — PATIENT INSTRUCTIONS
For your acute illness:  - Please take Tylenol 1000 mg every 8 hours for the next 4 to 5 days scheduled  - Please take DuoNebs every 4-6 hours scheduled for the next 4 to 5 days  - We will extend your prednisone medication and taper down over the next 10 days  -Okay to use Tessalon Perles and hydroxyzine as needed for congestion and cough.  The hydroxyzine can also be helpful for sleep    On your CT chest, they did see some lymph nodes along your mediastinum.  This is most likely in the setting of flu.  There is no evidence of pneumonia.  This CT chest does need to be repeated in 3 months to make sure that the lymphadenopathy has resolved.  The radiologist will be giving you a call regarding this.

## 2025-01-16 NOTE — TELEPHONE ENCOUNTER
Referral to Acute and Diagnostic Services    [phone] ([place]) [id number] [facility], [address], [place]  [address]    Transition to Acute & Diagnostic Services Clinic has been discussed with patient, and she agrees with next level of care.   Patient understands that evaluation/treatment at [facility] typically takes significantly longer than in clinic/urgent care (>2 hours).  The [facility] Acute and Diagnostics Services Clinic has been contacted by provider/staff to confirm patient acceptance.         Special issues:   needed               The following provider has assessed this patient for intervention at [facility], and directed the patient for referral: Myles Benavides MD            Called ADS and patient information provided. Provider is not at her desk. They will call back. Writer heard back from ADS. They will see patient and call her to set up a time.      Shmuel Kothari, BSN RN  [facility]

## 2025-01-29 ENCOUNTER — TELEPHONE (OUTPATIENT)
Dept: FAMILY MEDICINE | Facility: CLINIC | Age: 40
End: 2025-01-29
Payer: COMMERCIAL

## 2025-01-29 NOTE — TELEPHONE ENCOUNTER
Patient Quality Outreach    Patient is due for the following:   Asthma  -  ACT needed and AAP  Cervical Cancer Screening - PAP Needed  Physical Preventive Adult Physical      Topic Date Due    Pneumococcal Vaccine (1 of 2 - PCV) Never done    Hepatitis B Vaccine (2 of 3 - 19+ 3-dose series) 07/05/2007    COVID-19 Vaccine (3 - Moderna risk series) 09/20/2021    Flu Vaccine (1) 09/01/2024       Action(s) Taken:   Schedule a Adult Preventative    Type of outreach:    Sent WiWide message.    Questions for provider review:    None           Francine Jennings MA

## 2025-02-17 ENCOUNTER — OFFICE VISIT (OUTPATIENT)
Dept: FAMILY MEDICINE | Facility: CLINIC | Age: 40
End: 2025-02-17
Payer: COMMERCIAL

## 2025-02-17 VITALS
OXYGEN SATURATION: 99 % | HEIGHT: 62 IN | DIASTOLIC BLOOD PRESSURE: 83 MMHG | TEMPERATURE: 98.5 F | WEIGHT: 208 LBS | HEART RATE: 80 BPM | RESPIRATION RATE: 16 BRPM | BODY MASS INDEX: 38.28 KG/M2 | SYSTOLIC BLOOD PRESSURE: 118 MMHG

## 2025-02-17 DIAGNOSIS — L70.0 ACNE VULGARIS: ICD-10-CM

## 2025-02-17 DIAGNOSIS — Z13.220 SCREENING FOR HYPERLIPIDEMIA: ICD-10-CM

## 2025-02-17 DIAGNOSIS — Z59.819 HOUSING INSTABILITY: ICD-10-CM

## 2025-02-17 DIAGNOSIS — R10.11 RIGHT UPPER QUADRANT PAIN: ICD-10-CM

## 2025-02-17 DIAGNOSIS — Z01.84 IMMUNITY STATUS TESTING: ICD-10-CM

## 2025-02-17 DIAGNOSIS — Z13.1 SCREENING FOR DIABETES MELLITUS: ICD-10-CM

## 2025-02-17 DIAGNOSIS — Z12.4 CERVICAL CANCER SCREENING: ICD-10-CM

## 2025-02-17 DIAGNOSIS — Z23 NEED FOR VACCINATION: ICD-10-CM

## 2025-02-17 DIAGNOSIS — G43.009 MIGRAINE WITHOUT AURA AND WITHOUT STATUS MIGRAINOSUS, NOT INTRACTABLE: ICD-10-CM

## 2025-02-17 DIAGNOSIS — F60.3 BORDERLINE PERSONALITY DISORDER (H): ICD-10-CM

## 2025-02-17 DIAGNOSIS — Z00.00 ROUTINE GENERAL MEDICAL EXAMINATION AT A HEALTH CARE FACILITY: Primary | ICD-10-CM

## 2025-02-17 DIAGNOSIS — Z12.4 CERVICAL CANCER SCREENING: Primary | ICD-10-CM

## 2025-02-17 LAB
CHOLEST SERPL-MCNC: 205 MG/DL
EST. AVERAGE GLUCOSE BLD GHB EST-MCNC: 117 MG/DL
FASTING STATUS PATIENT QL REPORTED: NO
HBA1C MFR BLD: 5.7 % (ref 0–5.6)
HBV CORE AB SERPL QL IA: REACTIVE
HBV SURFACE AB SERPL IA-ACNC: 557 M[IU]/ML
HBV SURFACE AB SERPL IA-ACNC: REACTIVE M[IU]/ML
HBV SURFACE AG SERPL QL IA: NONREACTIVE
HDLC SERPL-MCNC: 53 MG/DL
LDLC SERPL CALC-MCNC: 120 MG/DL
NONHDLC SERPL-MCNC: 152 MG/DL
TRIGL SERPL-MCNC: 159 MG/DL

## 2025-02-17 PROCEDURE — T1013 SIGN LANG/ORAL INTERPRETER: HCPCS | Mod: U4

## 2025-02-17 PROCEDURE — 86706 HEP B SURFACE ANTIBODY: CPT | Performed by: FAMILY MEDICINE

## 2025-02-17 PROCEDURE — 36415 COLL VENOUS BLD VENIPUNCTURE: CPT | Performed by: FAMILY MEDICINE

## 2025-02-17 PROCEDURE — 80061 LIPID PANEL: CPT | Performed by: FAMILY MEDICINE

## 2025-02-17 PROCEDURE — 83036 HEMOGLOBIN GLYCOSYLATED A1C: CPT | Performed by: FAMILY MEDICINE

## 2025-02-17 PROCEDURE — 86704 HEP B CORE ANTIBODY TOTAL: CPT | Performed by: FAMILY MEDICINE

## 2025-02-17 PROCEDURE — 87340 HEPATITIS B SURFACE AG IA: CPT | Performed by: FAMILY MEDICINE

## 2025-02-17 RX ORDER — SUMATRIPTAN 50 MG/1
50 TABLET, FILM COATED ORAL
Qty: 10 TABLET | Refills: 3 | Status: SHIPPED | OUTPATIENT
Start: 2025-02-17

## 2025-02-17 RX ORDER — TRETINOIN 0.25 MG/G
CREAM TOPICAL AT BEDTIME
Qty: 45 G | Refills: 3 | Status: SHIPPED | OUTPATIENT
Start: 2025-02-17

## 2025-02-17 RX ORDER — FAMOTIDINE 20 MG/1
20 TABLET, FILM COATED ORAL 2 TIMES DAILY PRN
Qty: 60 TABLET | Refills: 5 | Status: SHIPPED | OUTPATIENT
Start: 2025-02-17

## 2025-02-17 SDOH — HEALTH STABILITY: PHYSICAL HEALTH: ON AVERAGE, HOW MANY MINUTES DO YOU ENGAGE IN EXERCISE AT THIS LEVEL?: 20 MIN

## 2025-02-17 SDOH — ECONOMIC STABILITY - HOUSING INSECURITY: HOUSING INSTABILITY UNSPECIFIED: Z59.819

## 2025-02-17 SDOH — HEALTH STABILITY: PHYSICAL HEALTH: ON AVERAGE, HOW MANY DAYS PER WEEK DO YOU ENGAGE IN MODERATE TO STRENUOUS EXERCISE (LIKE A BRISK WALK)?: 0 DAYS

## 2025-02-17 ASSESSMENT — ASTHMA QUESTIONNAIRES
QUESTION_2 LAST FOUR WEEKS HOW OFTEN HAVE YOU HAD SHORTNESS OF BREATH: ONCE A DAY
ACT_TOTALSCORE: 17
QUESTION_1 LAST FOUR WEEKS HOW MUCH OF THE TIME DID YOUR ASTHMA KEEP YOU FROM GETTING AS MUCH DONE AT WORK, SCHOOL OR AT HOME: SOME OF THE TIME
ACT_TOTALSCORE: 17
QUESTION_5 LAST FOUR WEEKS HOW WOULD YOU RATE YOUR ASTHMA CONTROL: COMPLETELY CONTROLLED
QUESTION_3 LAST FOUR WEEKS HOW OFTEN DID YOUR ASTHMA SYMPTOMS (WHEEZING, COUGHING, SHORTNESS OF BREATH, CHEST TIGHTNESS OR PAIN) WAKE YOU UP AT NIGHT OR EARLIER THAN USUAL IN THE MORNING: ONCE A WEEK
QUESTION_4 LAST FOUR WEEKS HOW OFTEN HAVE YOU USED YOUR RESCUE INHALER OR NEBULIZER MEDICATION (SUCH AS ALBUTEROL): ONCE A WEEK OR LESS

## 2025-02-17 ASSESSMENT — SOCIAL DETERMINANTS OF HEALTH (SDOH): HOW OFTEN DO YOU GET TOGETHER WITH FRIENDS OR RELATIVES?: ONCE A WEEK

## 2025-02-17 NOTE — PROGRESS NOTES
Preventive Care Visit  [employer] AVELINA Donaldson MD, Family Medicine  Feb 17, 2025  {Provider  Link to [facility] :[id number]}    {PROVIDER CHARTING PREFERENCE:420658}    Subjective   Najma is a 39 year old, presenting for the following:  Physical and Referral (Pt request help a  to help her with housing issues)        2/17/2025     7:50 AM   Additional Questions   Roomed by paw p   Accompanied by self          HPI    Pain on right upper side  - mild, comes and goes  - pain started before procedure - after procedure pain has been less  - will use tylenol when she gets pain, helps for a little bit  - not sure what causes pain  - if eats a lot, or eats something spicy, gets worse pain  Whole back burning pain, stiff neck as well    Next month landlord asked her to pay $6000 - normally $2240  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  20, 18, and 13yo children    Paroxetine, tylenol, vit D, gabapentin  Was not diagnosed with asthma - feels like airway thin when she has thoughts of worries - feels dizzy  Prescribed inhalers, but has not used - prescribed for pneumonia/flu    Migraines  - gets them on one side or the other  - tries tylenol and it does not help much  - throbbing pain in eye, turns red  - odors smell bad, needs to go somewhere to cool down  - plus photophotobia  - gets dizzy, sometimes nauseous  - usually around time of period  - does not take ibuprofen anymore because caused severe abdominal pain          Anxiety, depression, migraine headache  Sometimes feels palpitations when gets headache        Health Care Directive  Patient does not have a Health Care Directive: not discussed      2/17/2025   General Health   How would you rate your overall physical health? (!) FAIR   Feel stress (tense, anxious, or unable to sleep) Very much   (!) STRESS CONCERN      2/17/2025   Nutrition   Three or more servings of calcium each day?  Yes   Diet: I don't know   How many servings of fruit and vegetables per day? (!) 2-3   How many sweetened beverages each day? (!) 2         2/17/2025   Exercise   Days per week of moderate/strenous exercise 0 days   Average minutes spent exercising at this level 20 min   (!) EXERCISE CONCERN      2/17/2025   Social Factors   Frequency of gathering with friends or relatives Once a week   Worry food won't last until get money to buy more Yes   Food not last or not have enough money for food? Yes   Do you have housing? (Housing is defined as stable permanent housing and does not include staying ouside in a car, in a tent, in an abandoned building, in an overnight shelter, or couch-surfing.) No   Are you worried about losing your housing? No   Lack of transportation? No   Unable to get utilities (heat,electricity)? No   Want help with housing or utility concern? No   (!) FOOD SECURITY CONCERN PRESENT(!) HOUSING CONCERN PRESENT      2/17/2025   Dental   Dentist two times every year? (!) NO          { Rooming Staff Patient needs a PHQ as part of the AWV.  Use this link to complete and then refresh the note to pull results Link to PHQ9 Assessment :855467}  Today's PHQ-9 Score:       10/18/2024     9:28 AM   PHQ-9 SCORE   PHQ-9 Total Score MyChart 4 (Minimal depression)   PHQ-9 Total Score 4           2/17/2025   Substance Use   Alcohol more than 3/day or more than 7/wk Not Applicable   Do you use any other substances recreationally? No     Social History     Tobacco Use    Smoking status: Never     Passive exposure: Never    Smokeless tobacco: Current     Types: Chew    Tobacco comments:     no passive exposure  / Pt reports chewing Betel Nuts   Vaping Use    Vaping status: Never Used   Substance Use Topics    Alcohol use: No    Drug use: No     {Provider  If there are gaps in the social history shown above, please follow the link to update and then refresh the note Link to Social and Substance History :400380}    "  Mammogram Screening - Patient under 40 years of age: Routine Mammogram Screening not recommended.         2/17/2025   STI Screening   New sexual partner(s) since last STI/HIV test? No     History of abnormal Pap smear: { :669471}        Latest Ref Rng & Units 7/20/2020     3:09 PM 7/24/2015    10:23 AM   PAP / HPV   PAP Negative for squamous intraepithelial lesion or malignancy. Negative for squamous intraepithelial lesion or malignancy  Electronically signed by Lidya Ernandez CT (ASCP) on 7/29/2020 at 10:40 AM    Negative for squamous intraepithelial lesion or malignancy  Electronically signed by Teri Marques CT (ASCP) on 7/31/2015 at  1:00 PM      HPV 16 DNA NEG Negative     HPV 18 DNA NEG Negative     Other HR HPV NEG Negative             2/17/2025   Contraception/Family Planning   Questions about contraception or family planning No     {Provider  REQUIRED FOR AWV Use the storyboard to review patient history, after sections have been marked as reviewed, refresh note to capture documentation:831843}   Reviewed and updated as needed this visit by Provider                    {HISTORY OPTIONS (Optional):963381}    {ROS Picklists (Optional):241943}     Objective    Exam  /83   Pulse 80   Temp 98.5  F (36.9  C) (Oral)   Resp 16   Ht 1.57 m (5' 1.81\")   Wt 94.3 kg (208 lb)   LMP 02/09/2025 (Exact Date)   SpO2 99%   BMI 38.28 kg/m     Estimated body mass index is 38.28 kg/m  as calculated from the following:    Height as of this encounter: 1.57 m (5' 1.81\").    Weight as of this encounter: 94.3 kg (208 lb).    Physical Exam  {Exam Choices (Optional):322244}        Signed Electronically by: Carine Donaldson MD  {Email feedback regarding this note to [email or web address]   :432758}  " "than 7/wk Not Applicable   Do you use any other substances recreationally? No     Social History     Tobacco Use    Smoking status: Never     Passive exposure: Never    Smokeless tobacco: Current     Types: Chew    Tobacco comments:     no passive exposure  / Pt reports chewing Betel Nuts   Vaping Use    Vaping status: Never Used   Substance Use Topics    Alcohol use: No    Drug use: No          Mammogram Screening - Patient under 40 years of age: Routine Mammogram Screening not recommended.         2/17/2025   STI Screening   New sexual partner(s) since last STI/HIV test? No     History of abnormal Pap smear: No - age 30- 64 PAP with HPV every 5 years recommended        Latest Ref Rng & Units 7/20/2020     3:09 PM 7/24/2015    10:23 AM   PAP / HPV   PAP Negative for squamous intraepithelial lesion or malignancy. Negative for squamous intraepithelial lesion or malignancy  Electronically signed by Lidya Ernandez CT (ASCP) on 7/29/2020 at 10:40 AM    Negative for squamous intraepithelial lesion or malignancy  Electronically signed by Teri Marques CT (ASCP) on 7/31/2015 at  1:00 PM      HPV 16 DNA NEG Negative     HPV 18 DNA NEG Negative     Other HR HPV NEG Negative             2/17/2025   Contraception/Family Planning   Questions about contraception or family planning No        Reviewed and updated as needed this visit by Provider                          Review of Systems  Constitutional, HEENT, cardiovascular, pulmonary, gi and gu systems are negative, except as otherwise noted.     Objective    Exam  /83   Pulse 80   Temp 98.5  F (36.9  C) (Oral)   Resp 16   Ht 1.57 m (5' 1.81\")   Wt 94.3 kg (208 lb)   LMP 02/09/2025 (Exact Date)   SpO2 99%   BMI 38.28 kg/m     Estimated body mass index is 38.28 kg/m  as calculated from the following:    Height as of this encounter: 1.57 m (5' 1.81\").    Weight as of this encounter: 94.3 kg (208 lb).    Physical Exam  GENERAL: alert and no distress  EYES: " Eyes grossly normal to inspection, PERRL and conjunctivae and sclerae normal  HENT: ear canals and TM's normal, nose and mouth without ulcers or lesions  NECK: no adenopathy, no asymmetry, masses, or scars  RESP: lungs clear to auscultation - no rales, rhonchi or wheezes  CV: regular rate and rhythm, normal S1 S2, no S3 or S4, no murmur, click or rub, no peripheral edema  ABDOMEN: soft, nontender, no hepatosplenomegaly, no masses and bowel sounds normal  MS: no gross musculoskeletal defects noted, no edema  SKIN: no suspicious lesions or rashes, comedonal acne on face  NEURO: Normal strength and tone, mentation intact and speech normal  PSYCH: mentation appears normal, affect normal/bright        Signed Electronically by: Carine Donaldson MD

## 2025-02-20 ENCOUNTER — PATIENT OUTREACH (OUTPATIENT)
Dept: CARE COORDINATION | Facility: CLINIC | Age: 40
End: 2025-02-20
Payer: COMMERCIAL

## 2025-02-20 DIAGNOSIS — G44.229 CHRONIC TENSION-TYPE HEADACHE, NOT INTRACTABLE: ICD-10-CM

## 2025-02-20 DIAGNOSIS — F43.10 POSTTRAUMATIC STRESS DISORDER: ICD-10-CM

## 2025-02-20 DIAGNOSIS — F06.4 ANXIETY DISORDER DUE TO MEDICAL CONDITION: ICD-10-CM

## 2025-02-20 DIAGNOSIS — G89.29 OTHER CHRONIC PAIN: ICD-10-CM

## 2025-02-20 DIAGNOSIS — Z76.0 ENCOUNTER FOR MEDICATION REFILL: ICD-10-CM

## 2025-02-20 PROBLEM — R73.03 PREDIABETES: Status: ACTIVE | Noted: 2025-02-20

## 2025-02-20 RX ORDER — GABAPENTIN 300 MG/1
CAPSULE ORAL
Qty: 270 CAPSULE | Refills: 1 | Status: SHIPPED | OUTPATIENT
Start: 2025-02-20

## 2025-02-20 NOTE — RESULT ENCOUNTER NOTE
Team - please call patient with results. Your cholesterol was a little high, but not at all in the range of needing medication. Your A1c was slightly elevated. This test is a screening test for diabetes. It measures your blood sugars over the past three months. This test result means that you are at an increased risk of having diabetes (but do not have diabetes yet). You should eat a well balanced diet and try to get regular exercise. We should plan to check these levels again in one year.  You have protection against hepatitis B and do not need the hepatitis B vaccines.

## 2025-02-20 NOTE — PROGRESS NOTES
FRW Update   2/20/25 Outreach attempted x 1 was unable to reach. Left message on voicemail with call back information and requested return call.  Plan: Current outreach date reflects FRW 's follow up within one week.

## 2025-02-24 ENCOUNTER — PATIENT OUTREACH (OUTPATIENT)
Dept: CARE COORDINATION | Facility: CLINIC | Age: 40
End: 2025-02-24
Payer: COMMERCIAL

## 2025-02-24 NOTE — LETTER
[facility] [place] CARE COORDINATION        February 24, 2025      Najma Alexandra [facility]  [address] [place] [address]        Dear Najma,                                                                      The Financial Resource team has attempted to reach to you to assist you with any financial barriers you may be facing in your healthcare journey.  We would like to provide any additional support you may need related to financial support for your healthcare.  Our team are Certified MNSure Navigators, and we offer support with application assistance for Medical Assistance, MNSure Applications, Energy Assistance, Emergency Assistance, Food Assistance and many other initial applications for [facility] benefits.     I would appreciate if you would call me at [phone] to let me know if you would like assistance.      Sincerely,                                                                         Genie Shah MA

## 2025-02-24 NOTE — PROGRESS NOTES
FRW Update   2/24/25 Outreach attempted x 2. Left message on voicemail indicating last outreach attempt.   Plan: FRW closed the FRW program, sent letter. Patient can be referred back to [facility] if needed.

## 2025-02-27 PROBLEM — E66.01 CLASS 2 SEVERE OBESITY DUE TO EXCESS CALORIES WITH SERIOUS COMORBIDITY IN ADULT (H): Status: RESOLVED | Noted: 2024-01-09 | Resolved: 2025-02-27

## 2025-02-27 PROBLEM — E66.812 CLASS 2 SEVERE OBESITY DUE TO EXCESS CALORIES WITH SERIOUS COMORBIDITY IN ADULT (H): Status: RESOLVED | Noted: 2024-01-09 | Resolved: 2025-02-27

## 2025-02-27 PROBLEM — R10.11 RIGHT UPPER QUADRANT PAIN: Status: ACTIVE | Noted: 2025-02-27

## 2025-02-27 NOTE — PATIENT INSTRUCTIONS
Patient Education   You have been provided the Preventive Care Advice document.    Additional copies can be found here: [email or web address]  Preventive Care Advice   This is general advice given by our system to help you stay healthy. However, your care team may have specific advice just for you. Please talk to your care team about your preventive care needs.  Nutrition  Eat 5 or more servings of fruits and vegetables each day.  Try wheat bread, brown rice and whole grain pasta (instead of white bread, rice, and pasta).  Get enough calcium and vitamin D. Check the label on foods and aim for 100% of the RDA (recommended daily allowance).  Lifestyle  Exercise at least 150 minutes each week  (30 minutes a day, 5 days a week).  Do muscle strengthening activities 2 days a week. These help control your weight and prevent disease.  No smoking.  Wear sunscreen to prevent skin cancer.  Have a dental exam and cleaning every 6 months.  Yearly exams  See your health care team every year to talk about:  Any changes in your health.  Any medicines your care team has prescribed.  Preventive care, family planning, and ways to prevent chronic diseases.  Shots (vaccines)   HPV shots (up to age 26), if you've never had them before.  Hepatitis B shots (up to age 59), if you've never had them before.  COVID-19 shot: Get this shot when it's due.  Flu shot: Get a flu shot every year.  Tetanus shot: Get a tetanus shot every 10 years.  Pneumococcal, hepatitis A, and RSV shots: Ask your care team if you need these based on your risk.  Shingles shot (for age 50 and up)  General health tests  Diabetes screening:  Starting at age 35, Get screened for diabetes at least every 3 years.  If you are younger than age 35, ask your care team if you should be screened for diabetes.  Cholesterol test: At age 39, start having a cholesterol test every 5 years, or more often if advised.  Bone density scan (DEXA): At age 50, ask your care team if you  should have this scan for osteoporosis (brittle bones).  Hepatitis C: Get tested at least once in your life.  STIs (sexually transmitted infections)  Before age 24: Ask your care team if you should be screened for STIs.  After age 24: Get screened for STIs if you're at risk. You are at risk for STIs (including HIV) if:  You are sexually active with more than one person.  You don't use condoms every time.  You or a partner was diagnosed with a sexually transmitted infection.  If you are at risk for HIV, ask about PrEP medicine to prevent HIV.  Get tested for HIV at least once in your life, whether you are at risk for HIV or not.  Cancer screening tests  Cervical cancer screening: If you have a cervix, begin getting regular cervical cancer screening tests starting at age 21.  Breast cancer scan (mammogram): If you've ever had breasts, begin having regular mammograms starting at age 40. This is a scan to check for breast cancer.  Colon cancer screening: It is important to start screening for colon cancer at age 45.  Have a colonoscopy test every 10 years (or more often if you're at risk) Or, ask your provider about stool tests like a FIT test every year or Cologuard test every 3 years.  To learn more about your testing options, visit:   .  For help making a decision, visit:   https://bit.ly/xe79952.  Prostate cancer screening test: If you have a prostate, ask your care team if a prostate cancer screening test (PSA) at age 55 is right for you.  Lung cancer screening: If you are a current or former smoker ages 50 to 80, ask your care team if ongoing lung cancer screenings are right for you.  For informational purposes only. Not to replace the advice of your health care provider. Copyright   2023 [employer]. All rights reserved. Clinically reviewed by the [facility] Transitions Program. [employer] 937506 - REV 01/24.

## 2025-03-07 ENCOUNTER — OFFICE VISIT (OUTPATIENT)
Dept: URGENT CARE | Facility: URGENT CARE | Age: 40
End: 2025-03-07
Payer: COMMERCIAL

## 2025-03-07 ENCOUNTER — ANCILLARY PROCEDURE (OUTPATIENT)
Dept: GENERAL RADIOLOGY | Facility: CLINIC | Age: 40
End: 2025-03-07
Attending: PHYSICIAN ASSISTANT
Payer: COMMERCIAL

## 2025-03-07 VITALS
DIASTOLIC BLOOD PRESSURE: 80 MMHG | OXYGEN SATURATION: 100 % | BODY MASS INDEX: 38.2 KG/M2 | HEART RATE: 68 BPM | SYSTOLIC BLOOD PRESSURE: 115 MMHG | WEIGHT: 207.6 LBS | TEMPERATURE: 98.1 F

## 2025-03-07 DIAGNOSIS — F33.1 MODERATE EPISODE OF RECURRENT MAJOR DEPRESSIVE DISORDER (H): ICD-10-CM

## 2025-03-07 DIAGNOSIS — J45.21 MILD INTERMITTENT ASTHMA WITH ACUTE EXACERBATION: Primary | ICD-10-CM

## 2025-03-07 DIAGNOSIS — J45.21 MILD INTERMITTENT ASTHMA WITH ACUTE EXACERBATION: ICD-10-CM

## 2025-03-07 DIAGNOSIS — F06.4 ANXIETY DISORDER DUE TO MEDICAL CONDITION: ICD-10-CM

## 2025-03-07 PROCEDURE — 3079F DIAST BP 80-89 MM HG: CPT | Performed by: PHYSICIAN ASSISTANT

## 2025-03-07 PROCEDURE — 99214 OFFICE O/P EST MOD 30 MIN: CPT | Performed by: PHYSICIAN ASSISTANT

## 2025-03-07 PROCEDURE — 3074F SYST BP LT 130 MM HG: CPT | Performed by: PHYSICIAN ASSISTANT

## 2025-03-07 PROCEDURE — 71046 X-RAY EXAM CHEST 2 VIEWS: CPT | Mod: TC | Performed by: RADIOLOGY

## 2025-03-07 RX ORDER — LEVALBUTEROL TARTRATE 45 UG/1
1-2 AEROSOL, METERED ORAL EVERY 6 HOURS PRN
Qty: 15 G | Refills: 0 | Status: SHIPPED | OUTPATIENT
Start: 2025-03-07

## 2025-03-07 RX ORDER — PAROXETINE 20 MG/1
40 TABLET, FILM COATED ORAL EVERY MORNING
Qty: 28 TABLET | Refills: 0 | Status: SHIPPED | OUTPATIENT
Start: 2025-03-07 | End: 2025-03-21

## 2025-03-07 RX ORDER — PREDNISONE 20 MG/1
20 TABLET ORAL 2 TIMES DAILY
Qty: 10 TABLET | Refills: 0 | Status: SHIPPED | OUTPATIENT
Start: 2025-03-07 | End: 2025-03-12

## 2025-03-07 NOTE — TELEPHONE ENCOUNTER
Medication Question or Refill    Contacts       Contact Date/Time Type Contact Phone/Fax    03/07/2025 05:06 PM CST Phone (Incoming) Najma Rodriguez (Self) [phone] (M)            What medication are you calling about (include dose and sig)?: paroxetine    Preferred Pharmacy:   [id number] - [place], [place] - [address] AT [facility]  [address]  [place] [address]  Phone: [phone] Fax: [phone]      Controlled Substance Agreement on file:   CSA -- Patient Level:    CSA: None found at the patient level.       Who prescribed the medication?: Dr Montanez    Do you need a refill? Yes    When did you use the medication last? NA    Patient offered an appointment? No    Do you have any questions or concerns?  Yes: Patient is out of medication and pharmacy said the prescriptions are closed out.       Could we send this information to you in [facility] or would you prefer to receive a phone call?:   Patient would prefer a phone call   Okay to leave a detailed message?: Yes at Cell number on file:    Telephone Information:   Mobile [phone]

## 2025-03-08 NOTE — PROGRESS NOTES
SUBJECTIVE:   Najma Rodriguez is a 39 year old female presenting with a chief complaint of wheezing and shortness of breath.  Onset of symptoms was 1 day(s) ago.  Course of illness is same.    Severity moderate  Predisposing factors include pneumonia in January.  NO symptom in February.      Past Medical History:   Diagnosis Date    Anxiety disorder     Borderline personality disorder (H)     Chronic tension type headache     Daytime sleepiness 3/30/2023    Depression     Functional murmur     OCD (obsessive compulsive disorder)     Post traumatic stress disorder (PTSD)     Vitamin D deficiency      Current Outpatient Medications   Medication Sig Dispense Refill    gabapentin (NEURONTIN) 300 MG capsule TAKE 1 CAPSULE BY MOUTH THREE TIMES DAILY. 270 capsule 1    PARoxetine (PAXIL) 20 MG tablet [PAROXETINE (PAXIL) 20 MG TABLET] TAKE 2 TABLETS(40 MG) BY MOUTH EVERY MORNING 180 tablet 3    acetaminophen (TYLENOL) 500 MG tablet Take 1-2 tablets (500-1,000 mg) by mouth every 6 hours as needed for mild pain 100 tablet 3    cetirizine (ZYRTEC) 10 MG tablet 1-2 tabs daily as needed for itch (Patient not taking: Reported on 2/17/2025) 60 tablet 0    cholecalciferol 125 MCG (5000 UT) CAPS [CHOLECALCIFEROL, VITAMIN D3, 125 MCG (5,000 UNIT) CAPSULE] TAKE 1 CAPSULE BY MOUTH DAILY 90 capsule 1    famotidine (PEPCID) 20 MG tablet Take 1 tablet (20 mg) by mouth 2 times daily as needed (stomach pain). 60 tablet 5    SUMAtriptan (IMITREX) 50 MG tablet Take 1 tablet (50 mg) by mouth at onset of headache for migraine. May repeat in 2 hours. Max 4 tablets/24 hours. 10 tablet 3    tretinoin (RETIN-A) 0.025 % external cream Apply topically at bedtime. 45 g 3     Social History     Tobacco Use    Smoking status: Never     Passive exposure: Never    Smokeless tobacco: Current     Types: Chew    Tobacco comments:     no passive exposure  / Pt reports chewing Betel Nuts   Substance Use Topics    Alcohol use: No       ROS:  Review of systems  negative except as stated above.    OBJECTIVE:  /80   Pulse 68   Temp 98.1  F (36.7  C)   Wt 94.2 kg (207 lb 9.6 oz)   LMP 02/09/2025 (Exact Date)   SpO2 100%   BMI 38.20 kg/m    GENERAL APPEARANCE: healthy, alert and no distress  HENT: ear canals and TM's normal.  Nose and mouth without ulcers, erythema or lesions  RESP: lungs clear to auscultation - no rales, rhonchi or wheezes  CV: regular rates and rhythm, normal S1 S2, no murmur noted  NEURO: Normal strength and tone, sensory exam grossly normal,  normal speech and mentation  SKIN: no suspicious lesions or rashes    Results for orders placed or performed in visit on 03/07/25   XR Chest 2 Views     Status: None    Narrative    EXAM: XR CHEST 2 VIEWS  LOCATION: [facility]  DATE: 3/7/2025    INDICATION:  Mild intermittent asthma with acute exacerbation  COMPARISON: 7/20/2020      Impression    IMPRESSION: Lungs clear. No pleural fluid or pneumothorax. Bilateral calcified granulomas. Normal heart size and pulmonary vascularity. Degenerative hypertrophic changes in the spine. Surgical clips upper abdomen.         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

## 2025-03-10 RX ORDER — PAROXETINE 20 MG/1
TABLET, FILM COATED ORAL
Qty: 180 TABLET | Refills: 0 | Status: SHIPPED | OUTPATIENT
Start: 2025-03-10

## 2025-04-01 ENCOUNTER — ALLIED HEALTH/NURSE VISIT (OUTPATIENT)
Dept: NURSING | Facility: CLINIC | Age: 40
End: 2025-04-01
Payer: COMMERCIAL

## 2025-04-01 DIAGNOSIS — Z71.89 OTHER SPECIFIED COUNSELING: Primary | Chronic | ICD-10-CM

## 2025-04-01 PROCEDURE — 99207 PR NO CHARGE LOS: CPT

## 2025-04-01 NOTE — PROGRESS NOTES
Clinic Care Coordination Contact  Clinic Care Coordination Contact  OUTREACH    Referral Information:  Referral Source: PCP         Chief Complaint   Patient presents with    Clinic Care Coordination - Initial        Universal Utilization: appropriate     Utilization      No Show Count (past year)  7             ED Visits  0             Hospital Admissions  0                    Current as of: 4/1/2025  1:38 PM                Clinical Concerns:  Current Medical Concerns:  none    Current Behavioral Concerns: none    Education Provided to patient: CCC education, support and resources      Health Maintenance Reviewed: Due/Overdue   Health Maintenance Due   Topic Date Due    ASTHMA ACTION PLAN  Never done    Pneumococcal Vaccine: Pediatrics (0 to 5 Years) and At-Risk Patients (6 to 49 Years) (1 of 2 - PCV) Never done    COVID-19 Vaccine (3 - 2024-25 season) 09/01/2024    HPV TEST  07/20/2025    PAP  07/20/2025      Clinical Pathway: None    Medication Management:  Medication review status: Medications reviewed and no changes reported per patient.             Functional Status:  Mobility Status: Independent    Living Situation:  Current living arrangement:: I live in a private home with family    Lifestyle & Psychosocial Needs:    Social Drivers of Health     Food Insecurity: High Risk (2/17/2025)    Food Insecurity     Within the past 12 months, did you worry that your food would run out before you got money to buy more?: Yes     Within the past 12 months, did the food you bought just not last and you didn t have money to get more?: Yes   Depression: Not at risk (10/18/2024)    PHQ-2     PHQ-2 Score: 0   Housing Stability: High Risk (2/17/2025)    Housing Stability     Do you have housing? : No     Are you worried about losing your housing?: No   Tobacco Use: High Risk (3/7/2025)    Patient History     Smoking Tobacco Use: Never     Smokeless Tobacco Use: Current     Passive Exposure: Never   Financial Resource Strain:  Low Risk  (2/17/2025)    Financial Resource Strain     Within the past 12 months, have you or your family members you live with been unable to get utilities (heat, electricity) when it was really needed?: No   Alcohol Use: Not At Risk (7/20/2021)    AUDIT-C     Frequency of Alcohol Consumption: Never     Average Number of Drinks: Not on file     Frequency of Binge Drinking: Never   Transportation Needs: Low Risk  (2/17/2025)    Transportation Needs     Within the past 12 months, has lack of transportation kept you from medical appointments, getting your medicines, non-medical meetings or appointments, work, or from getting things that you need?: No   Physical Activity: Inactive (2/17/2025)    Exercise Vital Sign     Days of Exercise per Week: 0 days     Minutes of Exercise per Session: 20 min   Interpersonal Safety: Low Risk  (2/17/2025)    Interpersonal Safety     Do you feel physically and emotionally safe where you currently live?: Yes     Within the past 12 months, have you been hit, slapped, kicked or otherwise physically hurt by someone?: No     Within the past 12 months, have you been humiliated or emotionally abused in other ways by your partner or ex-partner?: No   Stress: Stress Concern Present (2/17/2025)    [employer] - Occupational Stress Questionnaire     Feeling of Stress : Very much   Social Connections: Unknown (2/17/2025)    Social Connection and Isolation Panel [NHANES]     Frequency of Communication with Friends and Family: Not on file     Frequency of Social Gatherings with Friends and Family: Once a week     Attends Islam Services: Not on file     Active Member of Clubs or Organizations: Not on file     Attends Club or Organization Meetings: Not on file     Marital Status: Not on file   Health Literacy: Not on file     Diet:: Regular  Inadequate nutrition (GOAL):: No  Tube Feeding: No  Inadequate activity/exercise (GOAL):: No  Significant changes in sleep  pattern (GOAL): No  Transportation means:: Accessible car, Regular car           Resources and Interventions:  Current Resources:      Community Resources: None  Supplies Currently Used at Home: None  Equipment Currently Used at Home: none  Employment Status: employed full-time              Referrals Placed: None    Care Plan:  Care Plan: resolve eviction       Problem: HP GENERAL PROBLEM       Goal: resolve eviction       Start Date: 4/1/2025    This Visit's Progress: 10%    Priority: High    Note:     Barriers: language  Strengths: motivated to resolve eviction  Patient expressed understanding of goal: yes  Action steps to achieve this goal:  1. I will answer the phone when FRW calls  2. I will follow through with requested information  3. I will keep CCC updated.                               Patient/Caregiver understanding: yes    Outreach Frequency: monthly, more frequently as needed  Future Appointments                In 2 months Carine Donaldson MD [facility] Cristiane CAROLINA [facility]            Plan: CCSW, pt and Noemi  ID [id number] reviewed eviction document. Pt's daughter had been working and did not report the income to public housing. Now the family owes $7,307 in back rent. FRW attempted to reach out to pt, and was unsuccessful. CCSW placed another FRW referral today to support pt applying for emergency assistance. CCSW, pt and  called [phone] who was familiar with the pt and situation. Az stated he would transfer the call to the , Idris LINDQUIST but the number was incorrect. CCSW will continue to try and reach Idris and ask what else we can do to support this pt.     Beata Whitney MSW, [facility]  Social Work Care Coordinator

## 2025-04-15 ENCOUNTER — PATIENT OUTREACH (OUTPATIENT)
Dept: CARE COORDINATION | Facility: CLINIC | Age: 40
End: 2025-04-15
Payer: COMMERCIAL

## 2025-04-15 NOTE — PROGRESS NOTES
FRW Update  4/15/25 Outreach attempted x 1 was unable to reach. Left message on voicemail with call back information and requested return call.  Plan: Current outreach date reflects FRW 's follow up within one week.

## 2025-04-21 ENCOUNTER — PATIENT OUTREACH (OUTPATIENT)
Dept: CARE COORDINATION | Facility: CLINIC | Age: 40
End: 2025-04-21
Payer: COMMERCIAL

## 2025-04-21 ENCOUNTER — APPOINTMENT (OUTPATIENT)
Dept: INTERPRETER SERVICES | Facility: CLINIC | Age: 40
End: 2025-04-21
Payer: COMMERCIAL

## 2025-04-21 NOTE — PROGRESS NOTES
FRW Update  4/21/25 FRW received call back and patient informed me she was not eligible for any county benefits due to income. FRW is closing program

## 2025-04-21 NOTE — PROGRESS NOTES
FRW Update  4/21/25 Outreach attempted x 2. Left message on voicemail indicating last outreach attempt.   Plan: FRW closed the FRW program, sent letter. Patient can be referred back to [facility] if needed.

## 2025-04-21 NOTE — LETTER
[facility] CARE COORDINATION        April 21, 2025      Najma Alexandra [facility]  [address] [place] [address]        Dear Najma,                                                                      The Financial Resource team has attempted to reach to you to assist you with any financial barriers you may be facing in your healthcare journey.  We would like to provide any additional support you may need related to financial support for your healthcare.  Our team are Certified MNSure Navigators, and we offer support with application assistance for Medical Assistance, MNSure Applications, Energy Assistance, Emergency Assistance, Food Assistance and many other initial applications for [facility] benefits.     I would appreciate if you would call me at [phone] to let me know if you would like assistance.      Sincerely,                                                                         Genie Shah MA

## 2025-04-28 ENCOUNTER — PATIENT OUTREACH (OUTPATIENT)
Dept: CARE COORDINATION | Facility: CLINIC | Age: 40
End: 2025-04-28
Payer: COMMERCIAL

## 2025-04-30 ENCOUNTER — PATIENT OUTREACH (OUTPATIENT)
Dept: CARE COORDINATION | Facility: CLINIC | Age: 40
End: 2025-04-30
Payer: COMMERCIAL

## 2025-05-16 DIAGNOSIS — L70.0 ACNE VULGARIS: ICD-10-CM

## 2025-05-17 RX ORDER — TRETINOIN 0.025 %
CREAM (GRAM) TOPICAL AT BEDTIME
Qty: 45 G | Refills: 5 | Status: SHIPPED | OUTPATIENT
Start: 2025-05-17

## 2025-05-19 ENCOUNTER — THERAPY VISIT (OUTPATIENT)
Dept: PHYSICAL THERAPY | Facility: CLINIC | Age: 40
End: 2025-05-19
Payer: COMMERCIAL

## 2025-05-19 DIAGNOSIS — M54.12 CERVICAL RADICULOPATHY: Primary | ICD-10-CM

## 2025-05-19 PROCEDURE — 97161 PT EVAL LOW COMPLEX 20 MIN: CPT | Mod: GP

## 2025-05-19 PROCEDURE — 97110 THERAPEUTIC EXERCISES: CPT | Mod: GP

## 2025-05-19 NOTE — PROGRESS NOTES
PHYSICAL THERAPY EVALUATION  Type of Visit: Evaluation       Fall Risk Screen:  Have you fallen 2 or more times in the past year?: No  Have you fallen and had an injury in the past year?: No    Subjective         Presenting condition or subjective complaint: able to manage the pain  Date of onset: 05/19/24    Relevant medical history: Depression; Heart problems   Dates & types of surgery: 2 years ago for having a baby    Prior diagnostic imaging/testing results:       Prior therapy history for the same diagnosis, illness or injury: Yes 5 years ago    KEY PT FINDINGS:  1) L sided neck pain with bilateral cervical rotation  2) Limited and painful cervical extension  3) Bilateral radicular symptoms    Physical Therapy Initial Evaluation: Subjective History     Injury/Condition Details:  Presenting Complaint Patient presents with neck pain and radiating symptoms. This has been a chronic issue for the patient. The patient has been dealing with the symptoms for just over a year. She notes both neck pain and L and R radicular symptoms. The radicular symptoms are described as a dull ache and burning - has a small degree of numbness into the hands. Symptoms are typically worse when sitting.    Onset Timing/Date 1 year ago   Mechanism Insidious onset     Symptom Behavior Details    Primary Symptoms Constant symptoms; worsen with activity   Worst Pain 8/10 (with cervical movements)   Symptom Provocators Rotating, extending the head   Best Pain 2/10    Symptom Relievers Rest, heat   Time of day dependent? No   Recent symptom change? 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   Usual physical activities  (within past year) Fishing   Orthopaedic history No   Notable medical history See Epic Chart   Patient Reported Health good        Living Environment  Social support: With family members   Type of  home: House   Stairs to enter the home: Yes 8 Is there a railing: Yes     Ramp: No   Stairs inside the home: Yes 8 Is there a railing: Yes     Help at home:    Equipment owned:       Employment: Yes TA  Hobbies/Interests: fishing    Patient goals for therapy: able to do things freely       Objective     CERVICAL:    Posture: Thoracic kyphosis    Neurological:    Motor Deficit: ALL INTACT  Myotomes L R   C4 (shoulder elevation)     C5 (shoulder abduction)     C6 (elbow flexion)     C7 (elbow extension)     C8 (thumb extension)     T1 (finger add/abd)      Strength (lb)       Sensory Deficit, Reflexes, Dural Signs: 2+ biceps bilaterally    AROM: (Major, Moderate, Minimal or Nil loss)  Movement Loss Chirag Mod Min Nil Pain   Protrusion    x    Flexion    X X   Retraction        Extension  X   X   Left Rotation  X   X   Right Rotation  X   X   Left Side Bending   x     Right Side bending   x           Repeated movement testing:     Seated cervical retraction - symptom improvement in neck pain - minor degree of radicular improvement    Seated cervical retraction/extension - increased pain in the neck    Supine retraction and extension (therapist assisted) - improved pain and radicular symptoms      Shoulder Screen: WNL    - Supine: Deep Neck Flexor Activation: not assessed  - Spinal mobility:     T-spine- Limited        Assessment & Plan   CLINICAL IMPRESSIONS  Medical Diagnosis: Cervical radiculopathy (M54.12)    Treatment Diagnosis: Neck pain with radicular pain   Impression/Assessment: Patient is a 39 year old female with neck pain and bilateral radicular symptom complaints.  The following significant findings have been identified: Pain, Decreased ROM/flexibility, Impaired muscle performance, Decreased activity tolerance, and Impaired posture. These impairments interfere with their ability to perform self care tasks, work tasks, recreational activities, and household chores as compared to previous level of function.      Clinical Decision Making (Complexity):  Clinical Presentation: Stable/Uncomplicated  Clinical Presentation Rationale: based on medical and personal factors listed in PT evaluation  Clinical Decision Making (Complexity): Low complexity    PLAN OF CARE  Treatment Interventions:  Interventions: Manual Therapy, Neuromuscular Re-education, Therapeutic Activity, Therapeutic Exercise, Self-Care/Home Management    Long Term Goals     PT Goal 1  Goal Identifier: Cervical AROM  Goal Description: Patient will demonstrated full, pain free, AROM of the cervical spine  Rationale: to maximize safety and independence with performance of ADLs and functional tasks;to maximize safety and independence with self cares  Target Date: 06/30/25  PT Goal 2  Goal Identifier: Radicular symptoms  Goal Description: Patient will have a 7 day period without R or L radicular symptoms  Rationale: to maximize safety and independence with self cares;to maximize safety and independence with performance of ADLs and functional tasks  Target Date: 07/28/25      Frequency of Treatment: 1x / week  Duration of Treatment: 10 weeks    Recommended Referrals to Other Professionals: Not at this time  Education Assessment:   Learner/Method: Patient;No Barriers to Learning  Education Comments: Discussed relevant anatomy, proper exercise progression, and goals from physical therapy    Risks and benefits of evaluation/treatment have been explained.   Patient/Family/caregiver agrees with Plan of Care.     Evaluation Time:     PT Eval, Low Complexity Minutes (38044): 20     Signing Clinician: Carlos Vincent, PT        [facility]                                                                                   OUTPATIENT PHYSICAL THERAPY      PLAN OF TREATMENT FOR OUTPATIENT REHABILITATION   Patient's Last Name, First Name, Najma Ortiz YOB: 1985   Provider's Name   [facility]    Medical Record No.  [id number]     Onset Date: 05/19/24  Start of Care Date: 05/19/25     Medical Diagnosis:  Cervical radiculopathy (M54.12)      PT Treatment Diagnosis:  Neck pain with radicular pain Plan of Treatment  Frequency/Duration: 1x / week/ 10 weeks    Certification date from 05/19/25 to 07/28/25         See note for plan of treatment details and functional goals     Carlos Vincent, PT                         I CERTIFY THE NEED FOR THESE SERVICES FURNISHED UNDER        THIS PLAN OF TREATMENT AND WHILE UNDER MY CARE     (Physician attestation of this document indicates review and certification of the therapy plan).              Referring Provider:  Carito Betancur    Initial Assessment  See Epic Evaluation- Start of Care Date: 05/19/25

## 2025-06-14 DIAGNOSIS — M54.12 CERVICAL RADICULOPATHY: ICD-10-CM

## 2025-06-14 DIAGNOSIS — F33.1 MODERATE EPISODE OF RECURRENT MAJOR DEPRESSIVE DISORDER (H): ICD-10-CM

## 2025-06-16 RX ORDER — PSEUDOEPHED/ACETAMINOPH/DIPHEN 30MG-500MG
TABLET ORAL
Qty: 100 TABLET | Refills: 0 | Status: SHIPPED | OUTPATIENT
Start: 2025-06-16

## 2025-06-16 RX ORDER — PAROXETINE 20 MG/1
TABLET, FILM COATED ORAL
Qty: 180 TABLET | Refills: 3 | Status: SHIPPED | OUTPATIENT
Start: 2025-06-16

## 2025-06-18 DIAGNOSIS — F33.1 MODERATE EPISODE OF RECURRENT MAJOR DEPRESSIVE DISORDER (H): ICD-10-CM

## 2025-06-18 DIAGNOSIS — F06.4 ANXIETY DISORDER DUE TO MEDICAL CONDITION: ICD-10-CM

## 2025-06-18 DIAGNOSIS — L70.0 ACNE VULGARIS: ICD-10-CM

## 2025-06-18 DIAGNOSIS — G89.29 OTHER CHRONIC PAIN: ICD-10-CM

## 2025-06-18 DIAGNOSIS — G44.229 CHRONIC TENSION-TYPE HEADACHE, NOT INTRACTABLE: ICD-10-CM

## 2025-06-18 DIAGNOSIS — F43.10 POSTTRAUMATIC STRESS DISORDER: ICD-10-CM

## 2025-06-18 DIAGNOSIS — Z76.0 ENCOUNTER FOR MEDICATION REFILL: ICD-10-CM

## 2025-06-18 DIAGNOSIS — M54.12 CERVICAL RADICULOPATHY: ICD-10-CM

## 2025-06-18 RX ORDER — PAROXETINE 20 MG/1
TABLET, FILM COATED ORAL
Qty: 180 TABLET | Refills: 3 | OUTPATIENT
Start: 2025-06-18

## 2025-06-18 RX ORDER — TRETINOIN 0.25 MG/G
CREAM TOPICAL AT BEDTIME
Qty: 45 G | Refills: 5 | OUTPATIENT
Start: 2025-06-18

## 2025-06-18 RX ORDER — PSEUDOEPHED/ACETAMINOPH/DIPHEN 30MG-500MG
TABLET ORAL
Qty: 100 TABLET | Refills: 0 | OUTPATIENT
Start: 2025-06-18

## 2025-06-18 RX ORDER — GABAPENTIN 300 MG/1
CAPSULE ORAL
Qty: 270 CAPSULE | Refills: 1 | OUTPATIENT
Start: 2025-06-18

## 2025-06-18 NOTE — TELEPHONE ENCOUNTER
Medication Question or Refill    Contacts       Contact Date/Time Type Contact Phone/Fax    06/18/2025 10:55 AM CDT Phone (Incoming) Najma Rodriguez (Self) [phone] (M)            What medication are you calling about (include dose and sig)?: acetaminophen (TYLENOL) 500 MG tablet   gabapentin (NEURONTIN) 300 MG capsule   PARoxetine (PAXIL) 20 MG tablet   tretinoin (RETIN-A) 0.025 % external cream   AND Vitamin D3 (not on active med list)    Preferred Pharmacy:   [id number] - [place], [place] - [address] AT [facility] [address]  [facility] [address] [place] [address]  Phone: [phone] Fax: [phone]      Controlled Substance Agreement on file:   CSA -- Patient Level:    CSA: None found at the patient level.       Who prescribed the medication?: PCP    Do you need a refill? Yes    When did you use the medication last?     Patient offered an appointment? No    Do you have any questions or concerns?  Yes: Please confirm if Vit D3 is still suppose to be taken as it is no longer on active med list.      Could we send this information to you in Vascular Imaging or would you prefer to receive a phone call?:   Patient would prefer a phone call   Okay to leave a detailed message?: Yes at Home number on file [phone] (home)

## 2025-07-09 ENCOUNTER — THERAPY VISIT (OUTPATIENT)
Dept: PHYSICAL THERAPY | Facility: CLINIC | Age: 40
End: 2025-07-09
Payer: COMMERCIAL

## 2025-07-09 DIAGNOSIS — M54.12 CERVICAL RADICULOPATHY: Primary | ICD-10-CM

## 2025-07-09 PROCEDURE — 97140 MANUAL THERAPY 1/> REGIONS: CPT | Mod: GP

## 2025-07-09 PROCEDURE — 97110 THERAPEUTIC EXERCISES: CPT | Mod: GP

## 2025-07-30 ENCOUNTER — TELEPHONE (OUTPATIENT)
Dept: FAMILY MEDICINE | Facility: CLINIC | Age: 40
End: 2025-07-30
Payer: COMMERCIAL

## 2025-07-30 NOTE — TELEPHONE ENCOUNTER
It does not appear patient has reviewed [employer] message sent from 6/24.  RN contacted pharmacy below and confirmed patient  medication on 6/25/25.  No further action needed.  [id number] - [place], [place] - [address] AT [facility] [address] - [phone]        Julian Ann RN   [facility]

## 2025-08-14 ENCOUNTER — TELEPHONE (OUTPATIENT)
Dept: FAMILY MEDICINE | Facility: CLINIC | Age: 40
End: 2025-08-14
Payer: COMMERCIAL